# Patient Record
Sex: MALE | Race: WHITE | NOT HISPANIC OR LATINO | Employment: OTHER | URBAN - METROPOLITAN AREA
[De-identification: names, ages, dates, MRNs, and addresses within clinical notes are randomized per-mention and may not be internally consistent; named-entity substitution may affect disease eponyms.]

---

## 2017-01-03 ENCOUNTER — HOSPITAL ENCOUNTER (OUTPATIENT)
Dept: LAB | Facility: MEDICAL CENTER | Age: 62
End: 2017-01-03
Attending: FAMILY MEDICINE
Payer: COMMERCIAL

## 2017-01-03 DIAGNOSIS — I10 ESSENTIAL HYPERTENSION: ICD-10-CM

## 2017-01-03 DIAGNOSIS — Z13.0 SCREENING FOR DEFICIENCY ANEMIA: ICD-10-CM

## 2017-01-03 DIAGNOSIS — R73.03 PREDIABETES: ICD-10-CM

## 2017-01-03 DIAGNOSIS — E78.5 HYPERLIPIDEMIA: ICD-10-CM

## 2017-01-03 LAB
ALBUMIN SERPL BCP-MCNC: 4.4 G/DL (ref 3.2–4.9)
ALBUMIN/GLOB SERPL: 1.5 G/DL
ALP SERPL-CCNC: 47 U/L (ref 30–99)
ALT SERPL-CCNC: 29 U/L (ref 2–50)
ANION GAP SERPL CALC-SCNC: 6 MMOL/L (ref 0–11.9)
AST SERPL-CCNC: 19 U/L (ref 12–45)
BASOPHILS # BLD AUTO: 0.08 K/UL (ref 0–0.12)
BASOPHILS NFR BLD AUTO: 1.2 % (ref 0–1.8)
BILIRUB SERPL-MCNC: 0.6 MG/DL (ref 0.1–1.5)
BUN SERPL-MCNC: 18 MG/DL (ref 8–22)
CALCIUM SERPL-MCNC: 9.8 MG/DL (ref 8.5–10.5)
CHLORIDE SERPL-SCNC: 105 MMOL/L (ref 96–112)
CHOLEST SERPL-MCNC: 218 MG/DL (ref 100–199)
CO2 SERPL-SCNC: 27 MMOL/L (ref 20–33)
CREAT SERPL-MCNC: 1.09 MG/DL (ref 0.5–1.4)
EOSINOPHIL # BLD: 0.23 K/UL (ref 0–0.51)
EOSINOPHIL NFR BLD AUTO: 3.5 % (ref 0–6.9)
ERYTHROCYTE [DISTWIDTH] IN BLOOD BY AUTOMATED COUNT: 41.1 FL (ref 35.9–50)
GLOBULIN SER CALC-MCNC: 2.9 G/DL (ref 1.9–3.5)
GLUCOSE SERPL-MCNC: 90 MG/DL (ref 65–99)
HCT VFR BLD AUTO: 50.1 % (ref 42–52)
HDLC SERPL-MCNC: 50 MG/DL
HGB BLD-MCNC: 15.9 G/DL (ref 14–18)
IMM GRANULOCYTES # BLD AUTO: 0.02 K/UL (ref 0–0.11)
IMM GRANULOCYTES NFR BLD AUTO: 0.3 % (ref 0–0.9)
LDLC SERPL CALC-MCNC: 144 MG/DL
LYMPHOCYTES # BLD: 2.33 K/UL (ref 1–4.8)
LYMPHOCYTES NFR BLD AUTO: 35.4 % (ref 22–41)
MCH RBC QN AUTO: 28.6 PG (ref 27–33)
MCHC RBC AUTO-ENTMCNC: 31.7 G/DL (ref 33.7–35.3)
MCV RBC AUTO: 90.3 FL (ref 81.4–97.8)
MONOCYTES # BLD: 0.55 K/UL (ref 0–0.85)
MONOCYTES NFR BLD AUTO: 8.4 % (ref 0–13.4)
NEUTROPHILS # BLD: 3.37 K/UL (ref 1.82–7.42)
NEUTROPHILS NFR BLD AUTO: 51.2 % (ref 44–72)
NRBC # BLD AUTO: 0 K/UL
NRBC BLD-RTO: 0 /100 WBC
PLATELET # BLD AUTO: 230 K/UL (ref 164–446)
PMV BLD AUTO: 10.7 FL (ref 9–12.9)
POTASSIUM SERPL-SCNC: 4.4 MMOL/L (ref 3.6–5.5)
PROT SERPL-MCNC: 7.3 G/DL (ref 6–8.2)
RBC # BLD AUTO: 5.55 M/UL (ref 4.7–6.1)
SODIUM SERPL-SCNC: 138 MMOL/L (ref 135–145)
TRIGL SERPL-MCNC: 120 MG/DL (ref 0–149)
TSH SERPL DL<=0.005 MIU/L-ACNC: 2.16 UIU/ML (ref 0.3–3.7)
WBC # BLD AUTO: 6.6 K/UL (ref 4.8–10.8)

## 2017-01-03 PROCEDURE — 80061 LIPID PANEL: CPT

## 2017-01-03 PROCEDURE — 80053 COMPREHEN METABOLIC PANEL: CPT

## 2017-01-03 PROCEDURE — 85025 COMPLETE CBC W/AUTO DIFF WBC: CPT

## 2017-01-03 PROCEDURE — 36415 COLL VENOUS BLD VENIPUNCTURE: CPT

## 2017-01-03 PROCEDURE — 84443 ASSAY THYROID STIM HORMONE: CPT

## 2017-01-09 ENCOUNTER — OFFICE VISIT (OUTPATIENT)
Dept: MEDICAL GROUP | Age: 62
End: 2017-01-09
Payer: COMMERCIAL

## 2017-01-09 VITALS
HEART RATE: 72 BPM | HEIGHT: 71 IN | TEMPERATURE: 98.5 F | OXYGEN SATURATION: 94 % | DIASTOLIC BLOOD PRESSURE: 70 MMHG | BODY MASS INDEX: 40.38 KG/M2 | WEIGHT: 288.4 LBS | SYSTOLIC BLOOD PRESSURE: 126 MMHG

## 2017-01-09 DIAGNOSIS — G47.30 SLEEP DISORDER BREATHING: ICD-10-CM

## 2017-01-09 DIAGNOSIS — E78.2 MIXED HYPERLIPIDEMIA: ICD-10-CM

## 2017-01-09 DIAGNOSIS — I10 ESSENTIAL HYPERTENSION: ICD-10-CM

## 2017-01-09 DIAGNOSIS — R73.03 PREDIABETES: ICD-10-CM

## 2017-01-09 DIAGNOSIS — E66.01 MORBID OBESITY WITH BMI OF 40.0-44.9, ADULT (HCC): ICD-10-CM

## 2017-01-09 PROCEDURE — 99214 OFFICE O/P EST MOD 30 MIN: CPT | Performed by: FAMILY MEDICINE

## 2017-01-09 RX ORDER — ATORVASTATIN CALCIUM 20 MG/1
20 TABLET, FILM COATED ORAL DAILY
Qty: 30 TAB | Refills: 2 | Status: SHIPPED | OUTPATIENT
Start: 2017-01-09 | End: 2017-05-03 | Stop reason: SDUPTHER

## 2017-01-09 ASSESSMENT — PATIENT HEALTH QUESTIONNAIRE - PHQ9: CLINICAL INTERPRETATION OF PHQ2 SCORE: 0

## 2017-01-09 NOTE — PROGRESS NOTES
Right Subjective:     Chief Complaint   Patient presents with   • Hyperlipidemia     lab review     Car Coyle is a 61 y.o. male here today for issues listed below    Metabolic syndrome/impaired fasting glucose.   No current meds or monitoring. Denies post-prandial nausea or unusual fatigue.   Dnies unusual polydipsia or polyuria.   Current diet:  Higher protein and lower carb.   Lab Results   Component Value Date/Time    GLYCOHEMOGLOBIN 5.6 07/06/2016 08:09 AM      HTN - Chronic condition stable. Currently taking all meds as directed.   He is taking baby aspirin daily.   He is monitoring BP at home. 120-130/65-70  Denies symptoms low BP: occasional light-headed, tunnel-vision, unusual fatigue.   Denies symptoms high BP:pounding headache, visual changes, palpitations, flushed face.   Denies medicine side effects: unusual fatigue, slow heartbeat, foot/leg swelling, cough.   Dyslipidemia: Chronic condition. Last labs are not at goal.  Currently taking simvastatin as directed.  Denies side effects--no myalgias or abdominal pain.  He is not exercising regularly.  He denies dizziness, claudication, or chest pain.  Patient has morbid obesity. He was previously evaluated for sleep-disordered breathing. In 2014 he had overnight oximetry which showed multiple desaturations and apneic episodes. Patient is not convinced of the veracity of this report because he feels fine. At the time he declined any further evaluation.  Allergies: Nkda  Current medicines (including changes today)  Current Outpatient Prescriptions   Medication Sig Dispense Refill   • atorvastatin (LIPITOR) 20 MG Tab Take 1 Tab by mouth every day. 30 Tab 2   • lisinopril (PRINIVIL, ZESTRIL) 30 MG tablet Take 1 Tab by mouth every day. 90 Tab 1   • amlodipine (NORVASC) 10 MG Tab Take 1 Tab by mouth every day. TAKE 1 TAB BY MOUTH EVERY DAY. 90 Tab 1   • MULTIPLE VITAMIN PO Take 1 Tab by mouth every day.     • SAW PALMETTO Take  by mouth.     •  "Cholecalciferol (VITAMIN D3) 2000 UNIT CAPS Take 1,000 Units by mouth every day.     • ibuprofen (MOTRIN) 200 MG TABS Take 400 mg by mouth every 6 hours as needed.     • aspirin EC (ECOTRIN) 81 MG TBEC Take 81 mg by mouth every day.       No current facility-administered medications for this visit.       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  Health Maintenance: Shingles vaccine advised  ROS  Constitutional: Negative for fever, chills/sweats   Respiratory: Negative for shortness of breath, WILLIS  Cardiovascular: Negative for chest pain or pressure  GI/: Negative for diarrhea/constipation / urinary difficulty  All other systems reviewed and are negative except as per HPI.        Objective:     Blood pressure 126/70, pulse 72, temperature 36.9 °C (98.5 °F), height 1.803 m (5' 10.98\"), weight 130.817 kg (288 lb 6.4 oz), SpO2 94 %. Body mass index is 40.24 kg/(m^2).  Physical Exam:  Alert, oriented in no acute distress.  Eye contact is good, speech goal directed, affect calm  HEENT: conjunctiva non-injected, sclera non-icteric.  Pinna normal without skin lesions. TM pearly gray.   Oral mucous membranes pink and moist with no lesions. Tonsils surgically absent  Neck No adenopathy or masses in the neck or supraclavicular regions.  No carotid bruits. No thyromegaly  Lungs: clear to auscultation bilaterally with good excursion.  CV: regular rate and rhythm.  Abdomen No CVAT  Ext: no edema, no tenderness to palpation over shins    Results reviewed from 1/3/17: Glucose at goal at 90: Total cholesterol elevated at 218. LDL significantly elevated at 144 up from 98. TSH normal at 2.16    Assessment and Plan:     Cholesterol not at goal-medication adjustment needed  Treatment Changes: Discontinue " simvastatin and start atorvastatin. If patient is tolerating he will contact our office for prescription to mail order pharmacy  Car was seen today for hyperlipidemia.    Diagnoses and all orders for this visit:    Prediabetes  Comments:  Discussed risk of progression to diabetes and encouraged 7-10% body weight loss through diet and exercise.  Orders:  -     INSULIN FASTING; Future    Essential hypertension  Comments:  At goal. Continue current medications.    Mixed hyperlipidemia  Comments:  Not at goal despite reports of consistent use of medication. Changed to atorvastatin.  Orders:  -     atorvastatin (LIPITOR) 20 MG Tab; Take 1 Tab by mouth every day.  -     COMP METABOLIC PANEL; Future  -     LIPID PROFILE; Future    Sleep disorder breathing  Comments:  Patient referred for sleep study. Discussed risks of untreated sleep apnea.  Orders:  -     REFERRAL TO SLEEP STUDIES    Morbid obesity with BMI of 40.0-44.9, adult (HCC)  Comments:  Discussed diet, exercise.  Orders:  -     Patient identified as having weight management issue.  Appropriate orders and counseling given.        Followup: Return in about 6 months (around 7/9/2017) for well adult physical. sooner should new symptoms or problems arise.

## 2017-01-09 NOTE — MR AVS SNAPSHOT
"        Car Hewitting   2017 11:10 AM   Office Visit   MRN: [id number]    Department:  [address] [place] [address]   Dept Phone:  [phone]    Description:  Male : 1955   Provider:  Amada Anthony M.D.           Reason for Visit     Hyperlipidemia lab review      Allergies as of 2017     Allergen Noted Reactions    Nkda [No Known Drug Allergy] 2012         You were diagnosed with     Prediabetes   [512554]       Essential hypertension   [0000305]       Mixed hyperlipidemia   [272.2.ICD-9-CM]       Sleep disorder breathing   [744013]         Vital Signs     Blood Pressure Pulse Temperature Height Weight Body Mass Index    126/70 mmHg 72 36.9 °C (98.5 °F) 1.803 m (5' 10.98\") 130.817 kg (288 lb 6.4 oz) 40.24 kg/m2    Oxygen Saturation Smoking Status                94% Never Smoker           Basic Information     Date Of Birth Sex Race Ethnicity Preferred Language    1955 Male White Non- English      Problem List              ICD-10-CM Priority Class Noted - Resolved    HTN (hypertension) I10   2012 - Present    Hyperlipidemia E78.5   2012 - Present    HDL deficiency E78.6   2012 - Present    Hypertriglyceridemia E78.1   2012 - Present    Preventative health care Z00.00   2012 - Present    Vitamin D insufficiency E55.9   2012 - Present    Hypotestosteronism E29.1   2012 - Present    BPH (benign prostatic hyperplasia) N40.0   2013 - Present    Prediabetes R73.03   2014 - Present    Sleep disorder breathing G47.30   10/17/2014 - Present    Obesity (BMI 30-39.9) E66.9   2016 - Present      Health Maintenance        Date Due Completion Dates    IMM DTaP/Tdap/Td Vaccine (1 - Tdap) 1975 ---    IMM ZOSTER VACCINE 2016 ---    IMM INFLUENZA (1) 2016 ---    COLONOSCOPY 2023            Current Immunizations     No immunizations on file.      Below and/or attached are the medications your provider expects you to take. " Review all of your home medications and newly ordered medications with your provider and/or pharmacist. Follow medication instructions as directed by your provider and/or pharmacist. Please keep your medication list with you and share with your provider. Update the information when medications are discontinued, doses are changed, or new medications (including over-the-counter products) are added; and carry medication information at all times in the event of emergency situations     Allergies:  NKDA - (reactions not documented)               Medications  Valid as of: January 09, 2017 - 11:35 AM    Generic Name Brand Name Tablet Size Instructions for use    AmLODIPine Besylate (Tab) NORVASC 10 MG Take 1 Tab by mouth every day. TAKE 1 TAB BY MOUTH EVERY DAY.        Aspirin (Tablet Delayed Response) ECOTRIN 81 MG Take 81 mg by mouth every day.        Atorvastatin Calcium (Tab) LIPITOR 20 MG Take 1 Tab by mouth every day.        Cholecalciferol (Cap) Vitamin D3 2000 UNIT Take 1,000 Units by mouth every day.        Ibuprofen (Tab) MOTRIN 200 MG Take 400 mg by mouth every 6 hours as needed.        Lisinopril (Tab) PRINIVIL, ZESTRIL 30 MG Take 1 Tab by mouth every day.        Multiple Vitamin   Take 1 Tab by mouth every day.        Saw Palmetto   Take  by mouth.        .                 Medicines prescribed today were sent to:     [facility] - [address] [address]    Phone: [phone] Fax: [phone]    Open 24 Hours?: No    [facility]ARMACY #[id number] - [place], [place] - [address]    [address] [place] [address]    Phone: [phone] Fax: [phone]    Open 24 Hours?: No      Medication refill instructions:       If your prescription bottle indicates you have medication refills left, it is not necessary to call your provider’s office. Please contact your pharmacy and they will refill your medication.    If your prescription bottle  indicates you do not have any refills left, you may request refills at any time through one of the following ways: The online [employer] (acquired by [employer] in 2014) system (except Urgent Care), by calling your provider’s office, or by asking your pharmacy to contact your provider’s office with a refill request. Medication refills are processed only during regular business hours and may not be available until the next business day. Your provider may request additional information or to have a follow-up visit with you prior to refilling your medication.   *Please Note: Medication refills are assigned a new Rx number when refilled electronically. Your pharmacy may indicate that no refills were authorized even though a new prescription for the same medication is available at the pharmacy. Please request the medicine by name with the pharmacy before contacting your provider for a refill.        Your To Do List     Future Labs/Procedures Complete By Expires    COMP METABOLIC PANEL  7/8/2017 1/9/2018    INSULIN FASTING  7/8/2017 1/9/2018    LIPID PROFILE  7/8/2017 1/9/2018      Referral     A referral request has been sent to our patient care coordination department. Please allow 3-5 business days for us to process this request and contact you either by phone or mail. If you do not hear from us by the 5th business day, please call us at [phone].        Instructions    If systolic blood pressure is less than 120, cut amlodipine in half.           [employer] (acquired by [employer] in 2014) Access Code: Activation code not generated  Current [employer] (acquired by [employer] in 2014) Status: Active

## 2017-04-11 ENCOUNTER — PATIENT MESSAGE (OUTPATIENT)
Dept: MEDICAL GROUP | Age: 62
End: 2017-04-11

## 2017-05-03 ENCOUNTER — OFFICE VISIT (OUTPATIENT)
Dept: MEDICAL GROUP | Facility: PHYSICIAN GROUP | Age: 62
End: 2017-05-03
Payer: COMMERCIAL

## 2017-05-03 VITALS
DIASTOLIC BLOOD PRESSURE: 80 MMHG | HEART RATE: 65 BPM | TEMPERATURE: 98.2 F | SYSTOLIC BLOOD PRESSURE: 120 MMHG | HEIGHT: 70 IN | WEIGHT: 295 LBS | BODY MASS INDEX: 42.23 KG/M2 | OXYGEN SATURATION: 95 % | RESPIRATION RATE: 16 BRPM

## 2017-05-03 DIAGNOSIS — E78.2 MIXED HYPERLIPIDEMIA: ICD-10-CM

## 2017-05-03 DIAGNOSIS — I10 ESSENTIAL HYPERTENSION: ICD-10-CM

## 2017-05-03 DIAGNOSIS — R73.03 PREDIABETES: ICD-10-CM

## 2017-05-03 DIAGNOSIS — E66.01 MORBID OBESITY WITH BMI OF 40.0-44.9, ADULT (HCC): ICD-10-CM

## 2017-05-03 PROCEDURE — 99214 OFFICE O/P EST MOD 30 MIN: CPT | Performed by: FAMILY MEDICINE

## 2017-05-03 RX ORDER — ATORVASTATIN CALCIUM 20 MG/1
20 TABLET, FILM COATED ORAL DAILY
Qty: 90 TAB | Refills: 2 | Status: SHIPPED | OUTPATIENT
Start: 2017-05-03 | End: 2018-03-05 | Stop reason: SDUPTHER

## 2017-05-03 RX ORDER — LISINOPRIL 30 MG/1
30 TABLET ORAL
Qty: 90 TAB | Refills: 1 | Status: SHIPPED | OUTPATIENT
Start: 2017-05-03 | End: 2017-11-28 | Stop reason: SDUPTHER

## 2017-05-03 RX ORDER — AMLODIPINE BESYLATE 10 MG/1
10 TABLET ORAL
Qty: 90 TAB | Refills: 1 | Status: SHIPPED | OUTPATIENT
Start: 2017-05-03 | End: 2017-11-28 | Stop reason: SDUPTHER

## 2017-05-03 ASSESSMENT — ENCOUNTER SYMPTOMS
MYALGIAS: 0
EYES NEGATIVE: 1
COUGH: 0
FEVER: 0
DIZZINESS: 0
GASTROINTESTINAL NEGATIVE: 1
RESPIRATORY NEGATIVE: 1
CARDIOVASCULAR NEGATIVE: 1
HEADACHES: 0
NECK PAIN: 0
MUSCULOSKELETAL NEGATIVE: 1
CHILLS: 0
HEMOPTYSIS: 0
HYPERTENSION: 1
CONSTIPATION: 0
NEUROLOGICAL NEGATIVE: 1
CONSTITUTIONAL NEGATIVE: 1
PSYCHIATRIC NEGATIVE: 1
PALPITATIONS: 0

## 2017-05-03 NOTE — PROGRESS NOTES
Subjective:      Car Coyle is a 61 y.o. male who presents with Establish Care and Medication Refill            HPI Comments: 1. Morbid obesity with BMI of 40.0-44.9, adult (CMS-[facility])  Advised on weight loss    2. Essential hypertension  Currently treated for HTN, taking meds with no CP or sob, monitors bp at home periodically. controlled      - lisinopril (PRINIVIL, ZESTRIL) 30 MG tablet; Take 1 Tab by mouth every day.  Dispense: 90 Tab; Refill: 1  - amlodipine (NORVASC) 10 MG Tab; Take 1 Tab by mouth every day. TAKE 1 TAB BY MOUTH EVERY DAY.  Dispense: 90 Tab; Refill: 1    3. Mixed hyperlipidemia  Currently treated for HLD, taking meds with no new myalgias or joint pain, watching fats in diet  controlled    Just switched over to lipitor and will check in 6 mo  - atorvastatin (LIPITOR) 20 MG Tab; Take 1 Tab by mouth every day.  Dispense: 90 Tab; Refill: 2    Past Medical History:    HTN (hypertension)                              6/13/2012     Hyperlipidemia                                  6/13/2012     HDL deficiency                                  6/13/2012     Hypertriglyceridemia                            6/13/2012     Vitamin d deficiency                            6/27/2012     Hypotestosteronism                              6/27/2012     Sleep disorder breathing                        10/17/2014      Comment:Oct 2014. Abnormal overnight oximetry. 136                desats.    Past Surgical History:    TONSILLECTOMY                                                  INCISION HERNIA REPAIR                           1970s         KNEE ARTHROSCOPY                                 2005            Comment:right knee    Smoking Status: Never Smoker                      Smokeless Status: Never Used                        Alcohol Use: Yes           0.0 oz/week       0 Standard drinks or equivalent per week       Comment: socially    Review of patient's family history indicates:    Heart Failure                   Mother                    Genetic                        Father                      Comment: alzheimer's    Diabetes                       Sister                    Cancer                         Maternal Aunt               Comment: breast cancer      Current outpatient prescriptions: •  lisinopril (PRINIVIL, ZESTRIL) 30 MG tablet, Take 1 Tab by mouth every day., Disp: 90 Tab, Rfl: 1•  amlodipine (NORVASC) 10 MG Tab, Take 1 Tab by mouth every day. TAKE 1 TAB BY MOUTH EVERY DAY., Disp: 90 Tab, Rfl: 1•  atorvastatin (LIPITOR) 20 MG Tab, Take 1 Tab by mouth every day., Disp: 90 Tab, Rfl: 2•  MULTIPLE VITAMIN PO, Take 1 Tab by mouth every day., Disp: , Rfl: •  SAW PALMETTO, Take  by mouth., Disp: , Rfl: •  Cholecalciferol (VITAMIN D3) 2000 UNIT CAPS, Take 1,000 Units by mouth every day., Disp: , Rfl: •  ibuprofen (MOTRIN) 200 MG TABS, Take 400 mg by mouth every 6 hours as needed., Disp: , Rfl: •  aspirin EC (ECOTRIN) 81 MG TBEC, Take 81 mg by mouth every day., Disp: , Rfl:          Hypertension  This is a chronic problem. The current episode started more than 1 year ago. The problem is unchanged. The problem is controlled. Pertinent negatives include no chest pain, headaches, neck pain or palpitations. Risk factors for coronary artery disease include obesity and male gender. Past treatments include calcium channel blockers. The current treatment provides significant improvement. Compliance problems include diet.        Review of Systems   Constitutional: Negative.  Negative for fever and chills.        Past Medical History:    HTN (hypertension)                              6/13/2012     Hyperlipidemia                                  6/13/2012     HDL deficiency                                  6/13/2012     Hypertriglyceridemia                            6/13/2012     Vitamin d deficiency                            6/27/2012     Hypotestosteronism                              6/27/2012     Sleep disorder breathing  "                       10/17/2014      Comment:Oct 2014. Abnormal overnight oximetry. 136                desats.    Past Surgical History:    TONSILLECTOMY                                                  INCISION HERNIA REPAIR                           1970s         KNEE ARTHROSCOPY                                 2005            Comment:right knee    Smoking Status: Never Smoker                      Smokeless Status: Never Used                        Alcohol Use: Yes           0.0 oz/week       0 Standard drinks or equivalent per week       Comment: socially    Review of patient's family history indicates:    Heart Failure                  Mother                    Genetic                        Father                      Comment: alzheimer's    Diabetes                       Sister                    Cancer                         Maternal Aunt               Comment: breast cancer     HENT: Negative.    Eyes: Negative.    Respiratory: Negative.  Negative for cough and hemoptysis.    Cardiovascular: Negative.  Negative for chest pain and palpitations.   Gastrointestinal: Negative.  Negative for constipation.   Genitourinary: Negative.  Negative for dysuria and urgency.   Musculoskeletal: Negative.  Negative for myalgias and neck pain.   Skin: Negative.  Negative for rash.   Neurological: Negative.  Negative for dizziness and headaches.   Endo/Heme/Allergies: Negative.    Psychiatric/Behavioral: Negative.  Negative for suicidal ideas.          Objective:     /80 mmHg  Pulse 65  Temp(Src) 36.8 °C (98.2 °F)  Resp 16  Ht 1.778 m (5' 10\")  Wt 133.811 kg (295 lb)  BMI 42.33 kg/m2  SpO2 95%     Physical Exam   Constitutional: He is oriented to person, place, and time. No distress.   HENT:   Head: Normocephalic and atraumatic.   Right Ear: External ear normal.   Left Ear: External ear normal.   Nose: Nose normal.   Mouth/Throat: Oropharynx is clear and moist. No oropharyngeal exudate.   Eyes: Pupils are " equal, round, and reactive to light. Right eye exhibits no discharge. Left eye exhibits no discharge. No scleral icterus.   Neck: Normal range of motion. Neck supple. No JVD present. No tracheal deviation present. No thyromegaly present.   Cardiovascular: Normal rate, regular rhythm, normal heart sounds and intact distal pulses.  Exam reveals no gallop and no friction rub.    No murmur heard.  Pulmonary/Chest: Effort normal and breath sounds normal. No stridor. No respiratory distress. He has no wheezes. He has no rales. He exhibits no tenderness.   Abdominal: Soft. He exhibits no distension. There is no tenderness.   Lymphadenopathy:     He has no cervical adenopathy.   Neurological: He is alert and oriented to person, place, and time.   Skin: Skin is warm and dry. He is not diaphoretic.   Psychiatric: Judgment normal.   Nursing note and vitals reviewed.              Assessment/Plan:     1. Morbid obesity with BMI of 40.0-44.9, adult (CMS-[facility])      2. Essential hypertension    - lisinopril (PRINIVIL, ZESTRIL) 30 MG tablet; Take 1 Tab by mouth every day.  Dispense: 90 Tab; Refill: 1  - amlodipine (NORVASC) 10 MG Tab; Take 1 Tab by mouth every day. TAKE 1 TAB BY MOUTH EVERY DAY.  Dispense: 90 Tab; Refill: 1    3. Mixed hyperlipidemia    - atorvastatin (LIPITOR) 20 MG Tab; Take 1 Tab by mouth every day.  Dispense: 90 Tab; Refill: 2

## 2017-05-03 NOTE — MR AVS SNAPSHOT
"        Car Coyle   5/3/2017 9:00 AM   Office Visit   MRN: [id number]    Department:  [facility]    Dept Phone:  [phone]    Description:  Male : 1955   Provider:  Jonathan Reyes M.D.           Reason for Visit     Establish Care     Medication Refill           Allergies as of 5/3/2017     Allergen Noted Reactions    Nkda [No Known Drug Allergy] 2012         You were diagnosed with     Morbid obesity with BMI of 40.0-44.9, adult (CMS-HCC)   [813985]       Essential hypertension   [7789804]       Mixed hyperlipidemia   [272.2.ICD-9-CM]       Prediabetes   [343255]         Vital Signs     Blood Pressure Pulse Temperature Respirations Height Weight    120/80 mmHg 65 36.8 °C (98.2 °F) 16 1.778 m (5' 10\") 133.811 kg (295 lb)    Body Mass Index Oxygen Saturation Smoking Status             42.33 kg/m2 95% Never Smoker          Basic Information     Date Of Birth Sex Race Ethnicity Preferred Language    1955 Male White Non- English      Your appointments     2017  8:30 AM   Established Patient with Jonathan Reyes M.D.   [facility] (--)    [address] [facility] [id number] [phone]           You will be receiving a confirmation call a few days before your appointment from our automated call confirmation system.              Problem List              ICD-10-CM Priority Class Noted - Resolved    HTN (hypertension) I10   2012 - Present    Hyperlipidemia E78.5   2012 - Present    Vitamin D insufficiency E55.9   2012 - Present    Hypotestosteronism E29.1   2012 - Present    BPH (benign prostatic hyperplasia) N40.0   2013 - Present    Prediabetes R73.03   2014 - Present    Sleep disorder breathing G47.30   10/17/2014 - Present    Morbid obesity with BMI of 40.0-44.9, adult (CMS-HCC) E66.01, Z68.41   2017 - Present      Health Maintenance        Date Due Completion Dates    IMM DTaP/Tdap/Td " Vaccine (1 - Tdap) 7/9/1975 ---    IMM ZOSTER VACCINE 7/9/2016 ---    COLONOSCOPY 11/12/2023 11/12/2013            Current Immunizations     No immunizations on file.      Below and/or attached are the medications your provider expects you to take. Review all of your home medications and newly ordered medications with your provider and/or pharmacist. Follow medication instructions as directed by your provider and/or pharmacist. Please keep your medication list with you and share with your provider. Update the information when medications are discontinued, doses are changed, or new medications (including over-the-counter products) are added; and carry medication information at all times in the event of emergency situations     Allergies:  NKDA - (reactions not documented)               Medications  Valid as of: May 03, 2017 -  9:26 AM    Generic Name Brand Name Tablet Size Instructions for use    AmLODIPine Besylate (Tab) NORVASC 10 MG Take 1 Tab by mouth every day. TAKE 1 TAB BY MOUTH EVERY DAY.        Aspirin (Tablet Delayed Response) ECOTRIN 81 MG Take 81 mg by mouth every day.        Atorvastatin Calcium (Tab) LIPITOR 20 MG Take 1 Tab by mouth every day.        Cholecalciferol (Cap) Vitamin D3 2000 UNIT Take 1,000 Units by mouth every day.        Ibuprofen (Tab) MOTRIN 200 MG Take 400 mg by mouth every 6 hours as needed.        Lisinopril (Tab) PRINIVIL, ZESTRIL 30 MG Take 1 Tab by mouth every day.        Multiple Vitamin   Take 1 Tab by mouth every day.        Saw Palmetto   Take  by mouth.        .                 Medicines prescribed today were sent to:     [facility] - [address]    [address] [facility] [address]    Phone: [phone] Fax: [phone]    Open 24 Hours?: No    [facility] #[id number] - [place], [place] - [address]    [address] [facility] [address]    Phone: [phone] Fax: [phone]    Open 24 Hours?: No      Medication refill  instructions:       If your prescription bottle indicates you have medication refills left, it is not necessary to call your provider’s office. Please contact your pharmacy and they will refill your medication.    If your prescription bottle indicates you do not have any refills left, you may request refills at any time through one of the following ways: The online [employer] system (except Urgent Care), by calling your provider’s office, or by asking your pharmacy to contact your provider’s office with a refill request. Medication refills are processed only during regular business hours and may not be available until the next business day. Your provider may request additional information or to have a follow-up visit with you prior to refilling your medication.   *Please Note: Medication refills are assigned a new Rx number when refilled electronically. Your pharmacy may indicate that no refills were authorized even though a new prescription for the same medication is available at the pharmacy. Please request the medicine by name with the pharmacy before contacting your provider for a refill.        Your To Do List     Future Labs/Procedures Complete By Expires    COMP METABOLIC PANEL  As directed 5/3/2018    HEMOGLOBIN A1C  As directed 5/3/2018    LIPID PROFILE  As directed 5/3/2018    PROSTATE SPECIFIC AG SCREENING  As directed 11/2/2017         [employer] Access Code: Activation code not generated  Current [employer] Status: Active

## 2017-10-25 ENCOUNTER — HOSPITAL ENCOUNTER (OUTPATIENT)
Dept: LAB | Facility: MEDICAL CENTER | Age: 62
End: 2017-10-25
Attending: FAMILY MEDICINE
Payer: COMMERCIAL

## 2017-10-25 DIAGNOSIS — R73.03 PREDIABETES: ICD-10-CM

## 2017-10-25 DIAGNOSIS — I10 ESSENTIAL HYPERTENSION: ICD-10-CM

## 2017-10-25 DIAGNOSIS — E66.01 MORBID OBESITY WITH BMI OF 40.0-44.9, ADULT (HCC): ICD-10-CM

## 2017-10-25 DIAGNOSIS — E78.2 MIXED HYPERLIPIDEMIA: ICD-10-CM

## 2017-10-25 LAB
ALBUMIN SERPL BCP-MCNC: 4.2 G/DL (ref 3.2–4.9)
ALBUMIN/GLOB SERPL: 1.2 G/DL
ALP SERPL-CCNC: 66 U/L (ref 30–99)
ALT SERPL-CCNC: 27 U/L (ref 2–50)
ANION GAP SERPL CALC-SCNC: 8 MMOL/L (ref 0–11.9)
AST SERPL-CCNC: 20 U/L (ref 12–45)
BILIRUB SERPL-MCNC: 0.7 MG/DL (ref 0.1–1.5)
BUN SERPL-MCNC: 25 MG/DL (ref 8–22)
CALCIUM SERPL-MCNC: 10.1 MG/DL (ref 8.5–10.5)
CHLORIDE SERPL-SCNC: 106 MMOL/L (ref 96–112)
CHOLEST SERPL-MCNC: 167 MG/DL (ref 100–199)
CO2 SERPL-SCNC: 25 MMOL/L (ref 20–33)
CREAT SERPL-MCNC: 1.06 MG/DL (ref 0.5–1.4)
EST. AVERAGE GLUCOSE BLD GHB EST-MCNC: 123 MG/DL
GFR SERPL CREATININE-BSD FRML MDRD: >60 ML/MIN/1.73 M 2
GLOBULIN SER CALC-MCNC: 3.6 G/DL (ref 1.9–3.5)
GLUCOSE SERPL-MCNC: 88 MG/DL (ref 65–99)
HBA1C MFR BLD: 5.9 % (ref 0–5.6)
HDLC SERPL-MCNC: 35 MG/DL
LDLC SERPL CALC-MCNC: 99 MG/DL
POTASSIUM SERPL-SCNC: 4.5 MMOL/L (ref 3.6–5.5)
PROT SERPL-MCNC: 7.8 G/DL (ref 6–8.2)
PSA SERPL-MCNC: 1.13 NG/ML (ref 0–4)
SODIUM SERPL-SCNC: 139 MMOL/L (ref 135–145)
TRIGL SERPL-MCNC: 165 MG/DL (ref 0–149)

## 2017-10-25 PROCEDURE — 36415 COLL VENOUS BLD VENIPUNCTURE: CPT

## 2017-10-25 PROCEDURE — 84153 ASSAY OF PSA TOTAL: CPT

## 2017-10-25 PROCEDURE — 80061 LIPID PANEL: CPT

## 2017-10-25 PROCEDURE — 83036 HEMOGLOBIN GLYCOSYLATED A1C: CPT

## 2017-10-25 PROCEDURE — 80053 COMPREHEN METABOLIC PANEL: CPT

## 2017-11-07 ENCOUNTER — OFFICE VISIT (OUTPATIENT)
Dept: MEDICAL GROUP | Facility: PHYSICIAN GROUP | Age: 62
End: 2017-11-07
Payer: COMMERCIAL

## 2017-11-07 VITALS
BODY MASS INDEX: 43.23 KG/M2 | HEIGHT: 70 IN | SYSTOLIC BLOOD PRESSURE: 122 MMHG | WEIGHT: 302 LBS | HEART RATE: 63 BPM | OXYGEN SATURATION: 95 % | TEMPERATURE: 98 F | DIASTOLIC BLOOD PRESSURE: 62 MMHG | RESPIRATION RATE: 14 BRPM

## 2017-11-07 DIAGNOSIS — Z23 NEED FOR INFLUENZA VACCINATION: ICD-10-CM

## 2017-11-07 DIAGNOSIS — I10 ESSENTIAL HYPERTENSION: ICD-10-CM

## 2017-11-07 DIAGNOSIS — E78.2 MIXED HYPERLIPIDEMIA: ICD-10-CM

## 2017-11-07 DIAGNOSIS — Z23 NEED FOR TDAP VACCINATION: ICD-10-CM

## 2017-11-07 DIAGNOSIS — R73.03 PREDIABETES: ICD-10-CM

## 2017-11-07 PROCEDURE — 99214 OFFICE O/P EST MOD 30 MIN: CPT | Mod: 25 | Performed by: FAMILY MEDICINE

## 2017-11-07 PROCEDURE — 90715 TDAP VACCINE 7 YRS/> IM: CPT | Performed by: FAMILY MEDICINE

## 2017-11-07 PROCEDURE — 90686 IIV4 VACC NO PRSV 0.5 ML IM: CPT | Performed by: FAMILY MEDICINE

## 2017-11-07 PROCEDURE — 90472 IMMUNIZATION ADMIN EACH ADD: CPT | Performed by: FAMILY MEDICINE

## 2017-11-07 PROCEDURE — 90471 IMMUNIZATION ADMIN: CPT | Performed by: FAMILY MEDICINE

## 2017-11-07 ASSESSMENT — ENCOUNTER SYMPTOMS
HEMOPTYSIS: 0
COUGH: 0
FEVER: 0
CHILLS: 0
NEUROLOGICAL NEGATIVE: 1
DIZZINESS: 0
CONSTITUTIONAL NEGATIVE: 1
HEADACHES: 0
CONSTIPATION: 0
MYALGIAS: 0
MUSCULOSKELETAL NEGATIVE: 1
PALPITATIONS: 0
NECK PAIN: 0
RESPIRATORY NEGATIVE: 1
GASTROINTESTINAL NEGATIVE: 1
CARDIOVASCULAR NEGATIVE: 1
EYES NEGATIVE: 1
PSYCHIATRIC NEGATIVE: 1

## 2017-11-07 NOTE — PROGRESS NOTES
Subjective:      Car Coyle is a 62 y.o. male who presents with Blood Pressure Problem            1. Need for influenza vaccination  - Flu Quad Inj >3 Year Pre-Filled PF    2. Need for Tdap vaccination    - Tdap =>6yo IM    3. Essential hypertension  Currently treated for HTN, taking meds with no CP or sob, monitors bp at home periodically. controlled      4. Mixed hyperlipidemia  Currently treated for HLD, taking meds with no new myalgias or joint pain, watching fats in diet  controlled  ldl at [facility]    5. Prediabetes  a1c of 5.9 will continue with weight loss and diet    Past Medical History:  6/13/2012: HDL deficiency  6/13/2012: HTN (hypertension)  6/13/2012: Hyperlipidemia  6/13/2012: Hypertriglyceridemia  6/27/2012: Hypotestosteronism  10/17/2014: Sleep disorder breathing      Comment: Oct 2014. Abnormal overnight oximetry. 136                desats.    6/27/2012: Vitamin d deficiency  Past Surgical History:  2005: KNEE ARTHROSCOPY      Comment: right knee  1970s: INCISION HERNIA REPAIR  No date: TONSILLECTOMY  Smoking status: Never Smoker                                                              Smokeless tobacco: Never Used                      Alcohol use: Yes           0.0 oz/week     Comment: socially    Review of patient's family history indicates:    Heart Failure                  Mother                    Genetic                        Father                      Comment: alzheimer's    Diabetes                       Sister                    Cancer                         Maternal Aunt               Comment: breast cancer      Current Outpatient Prescriptions: •  amlodipine (NORVASC) 10 MG Tab, Take 1 Tab by mouth every day. TAKE 1 TAB BY MOUTH EVERY DAY., Disp: 90 Tab, Rfl: 1•  atorvastatin (LIPITOR) 20 MG Tab, Take 1 Tab by mouth every day., Disp: 90 Tab, Rfl: 2•  lisinopril (PRINIVIL, ZESTRIL) 30 MG tablet, Take 1 Tab by mouth every day. (Patient not taking: Reported on 11/7/2017),  Disp: 90 Tab, Rfl: 1•  MULTIPLE VITAMIN PO, Take 1 Tab by mouth every day., Disp: , Rfl: •  SAW PALMETTO, Take  by mouth., Disp: , Rfl: •  Cholecalciferol (VITAMIN D3) 2000 UNIT CAPS, Take 1,000 Units by mouth every day., Disp: , Rfl: •  ibuprofen (MOTRIN) 200 MG TABS, Take 400 mg by mouth every 6 hours as needed., Disp: , Rfl: •  aspirin EC (ECOTRIN) 81 MG TBEC, Take 81 mg by mouth every day., Disp: , Rfl:      Patient was instructed on the use of medications, either prescriptions or OTC and informed on when the appropriate follow up time period should be. In addition, patient was also instructed that should any acute worsening occur that they should notify this clinic asap or call 911.            Review of Systems   Constitutional: Negative.  Negative for chills and fever.        Past Medical History:  6/13/2012: HDL deficiency  6/13/2012: HTN (hypertension)  6/13/2012: Hyperlipidemia  6/13/2012: Hypertriglyceridemia  6/27/2012: Hypotestosteronism  10/17/2014: Sleep disorder breathing      Comment: Oct 2014. Abnormal overnight oximetry. 136                desats.    6/27/2012: Vitamin d deficiency  Past Surgical History:  2005: KNEE ARTHROSCOPY      Comment: right knee  1970s: INCISION HERNIA REPAIR  No date: TONSILLECTOMY  Smoking status: Never Smoker                                                              Smokeless tobacco: Never Used                      Alcohol use: Yes           0.0 oz/week     Comment: socially    Review of patient's family history indicates:    Heart Failure                  Mother                    Genetic                        Father                      Comment: alzheimer's    Diabetes                       Sister                    Cancer                         Maternal Aunt               Comment: breast cancer     HENT: Negative.    Eyes: Negative.    Respiratory: Negative.  Negative for cough and hemoptysis.    Cardiovascular: Negative.  Negative for chest pain and  "palpitations.   Gastrointestinal: Negative.  Negative for constipation.   Genitourinary: Negative.  Negative for dysuria and urgency.   Musculoskeletal: Negative.  Negative for myalgias and neck pain.   Skin: Negative.  Negative for rash.   Neurological: Negative.  Negative for dizziness and headaches.   Endo/Heme/Allergies: Negative.    Psychiatric/Behavioral: Negative.  Negative for suicidal ideas.          Objective:     /62   Pulse 63   Temp 36.7 °C (98 °F)   Resp 14   Ht 1.778 m (5' 10\")   Wt (!) 137 kg (302 lb)   SpO2 95%   BMI 43.33 kg/m²      Physical Exam   Constitutional: He is oriented to person, place, and time. He appears well-developed and well-nourished. No distress.   HENT:   Head: Normocephalic and atraumatic.   Mouth/Throat: Oropharynx is clear and moist. No oropharyngeal exudate.   Eyes: Pupils are equal, round, and reactive to light.   Cardiovascular: Normal rate, regular rhythm, normal heart sounds and intact distal pulses.  Exam reveals no gallop and no friction rub.    No murmur heard.  Pulmonary/Chest: Effort normal and breath sounds normal. No respiratory distress. He has no wheezes. He has no rales. He exhibits no tenderness.   Neurological: He is alert and oriented to person, place, and time.   Skin: He is not diaphoretic.   Psychiatric: He has a normal mood and affect. His behavior is normal. Judgment and thought content normal.   Nursing note and vitals reviewed.              Assessment/Plan:     1. Need for influenza vaccination    - Flu Quad Inj >3 Year Pre-Filled PF    2. Need for Tdap vaccination    - Tdap =>8yo IM    3. Essential hypertension      4. Mixed hyperlipidemia      5. Prediabetes        "

## 2017-11-28 DIAGNOSIS — R73.03 PREDIABETES: ICD-10-CM

## 2017-11-28 DIAGNOSIS — E78.2 MIXED HYPERLIPIDEMIA: ICD-10-CM

## 2017-11-28 DIAGNOSIS — I10 ESSENTIAL HYPERTENSION: ICD-10-CM

## 2017-11-28 DIAGNOSIS — E66.01 MORBID OBESITY WITH BMI OF 40.0-44.9, ADULT (HCC): ICD-10-CM

## 2017-11-28 RX ORDER — AMLODIPINE BESYLATE 10 MG/1
10 TABLET ORAL
Qty: 90 TAB | Refills: 1 | Status: SHIPPED | OUTPATIENT
Start: 2017-11-28 | End: 2018-06-13 | Stop reason: SDUPTHER

## 2017-11-28 RX ORDER — LISINOPRIL 30 MG/1
30 TABLET ORAL
Qty: 90 TAB | Refills: 1 | Status: SHIPPED | OUTPATIENT
Start: 2017-11-28 | End: 2018-06-13 | Stop reason: SDUPTHER

## 2017-12-04 ENCOUNTER — OFFICE VISIT (OUTPATIENT)
Dept: MEDICAL GROUP | Facility: PHYSICIAN GROUP | Age: 62
End: 2017-12-04
Payer: COMMERCIAL

## 2017-12-04 VITALS
TEMPERATURE: 98.9 F | OXYGEN SATURATION: 94 % | WEIGHT: 306 LBS | DIASTOLIC BLOOD PRESSURE: 70 MMHG | HEIGHT: 70 IN | SYSTOLIC BLOOD PRESSURE: 150 MMHG | RESPIRATION RATE: 14 BRPM | HEART RATE: 70 BPM | BODY MASS INDEX: 43.81 KG/M2

## 2017-12-04 DIAGNOSIS — J06.9 VIRAL URI WITH COUGH: ICD-10-CM

## 2017-12-04 PROCEDURE — 99213 OFFICE O/P EST LOW 20 MIN: CPT | Performed by: NURSE PRACTITIONER

## 2017-12-04 RX ORDER — ALBUTEROL SULFATE 90 UG/1
1-2 AEROSOL, METERED RESPIRATORY (INHALATION) EVERY 4 HOURS PRN
Qty: 1 INHALER | Refills: 1 | Status: SHIPPED | OUTPATIENT
Start: 2017-12-04 | End: 2018-05-29 | Stop reason: SDUPTHER

## 2017-12-04 RX ORDER — PREDNISONE 20 MG/1
20 TABLET ORAL 2 TIMES DAILY
Qty: 10 TAB | Refills: 0 | Status: SHIPPED | OUTPATIENT
Start: 2017-12-04 | End: 2017-12-09

## 2017-12-04 ASSESSMENT — COPD QUESTIONNAIRES: COPD: 0

## 2017-12-04 ASSESSMENT — ENCOUNTER SYMPTOMS
WHEEZING: 1
SHORTNESS OF BREATH: 1
FEVER: 0
CHILLS: 0
SPUTUM PRODUCTION: 0
HEADACHES: 0
SORE THROAT: 1
DIZZINESS: 0
ABDOMINAL PAIN: 0
PALPITATIONS: 0
BLURRED VISION: 0
NAUSEA: 0
VOMITING: 0
NECK PAIN: 0
COUGH: 1

## 2017-12-04 NOTE — PROGRESS NOTES
Subjective:   Car Coyle is a 62 y.o. male here today for cough and sinus congestion.     Cough   This is a new problem. Episode onset: Started 5 days ago. The problem has been gradually worsening. The problem occurs constantly. The cough is productive of sputum. Associated symptoms include nasal congestion, a sore throat, shortness of breath and wheezing. Pertinent negatives include no chest pain, chills, ear congestion, ear pain, fever or headaches. Nothing aggravates the symptoms. Treatments tried: Dayquil and Zinc supplements. His past medical history is significant for environmental allergies. There is no history of asthma or COPD.   Positive sick contacts (wife). He denies history of smoking.    Current medicines (including changes today)  Current Outpatient Prescriptions   Medication Sig Dispense Refill   • predniSONE (DELTASONE) 20 MG Tab Take 1 Tab by mouth 2 times a day for 5 days. 10 Tab 0   • albuterol 108 (90 Base) MCG/ACT Aero Soln inhalation aerosol Inhale 1-2 Puffs by mouth every four hours as needed for Shortness of Breath. 1 Inhaler 1   • amlodipine (NORVASC) 10 MG Tab Take 1 Tab by mouth every day. TAKE 1 TAB BY MOUTH EVERY DAY. 90 Tab 1   • lisinopril (PRINIVIL, ZESTRIL) 30 MG tablet Take 1 Tab by mouth every day. 90 Tab 1   • atorvastatin (LIPITOR) 20 MG Tab Take 1 Tab by mouth every day. 90 Tab 2   • MULTIPLE VITAMIN PO Take 1 Tab by mouth every day.     • SAW PALMETTO Take  by mouth.     • Cholecalciferol (VITAMIN D3) 2000 UNIT CAPS Take 1,000 Units by mouth every day.     • ibuprofen (MOTRIN) 200 MG TABS Take 400 mg by mouth every 6 hours as needed.     • aspirin EC (ECOTRIN) 81 MG TBEC Take 81 mg by mouth every day.       No current facility-administered medications for this visit.      He  has a past medical history of HDL deficiency (6/13/2012); HTN (hypertension) (6/13/2012); Hyperlipidemia (6/13/2012); Hypertriglyceridemia (6/13/2012); Hypotestosteronism (6/27/2012); Sleep  "disorder breathing (10/17/2014); and Vitamin d deficiency (6/27/2012).    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        Review of Systems   Constitutional: Negative for chills, fever and malaise/fatigue.   HENT: Positive for congestion and sore throat. Negative for ear pain and tinnitus.    Eyes: Negative for blurred vision.   Respiratory: Positive for cough, shortness of breath and wheezing. Negative for sputum production.    Cardiovascular: Negative for chest pain, palpitations and leg swelling.        Chest tightness     Gastrointestinal: Negative for abdominal pain, nausea and vomiting.   Genitourinary: Negative for dysuria.   Musculoskeletal: Negative for joint pain and neck pain.   Neurological: Negative for dizziness and headaches.   Endo/Heme/Allergies: Positive for environmental allergies.      Objective:     Blood pressure 150/70, pulse 70, temperature 37.2 °C (98.9 °F), resp. rate 14, height 1.778 m (5' 10\"), weight (!) 138.8 kg (306 lb), SpO2 94 %. Body mass index is 43.91 kg/m².     Physical Exam   Constitutional: He is oriented to person, place, and time and well-developed, well-nourished, and in no distress. No distress.   HENT:   Head: Normocephalic and atraumatic.   Right Ear: Tympanic membrane and external ear normal.   Left Ear: Tympanic membrane and external ear normal.   Mouth/Throat: Posterior oropharyngeal erythema present. No oropharyngeal exudate.   Eyes: Conjunctivae and EOM are normal. Pupils are equal, " round, and reactive to light.   Neck: Normal range of motion. Neck supple.   Cardiovascular: Normal rate, regular rhythm, normal heart sounds and intact distal pulses.  Exam reveals no friction rub.    No murmur heard.  Pulmonary/Chest: Effort normal. No respiratory distress. He has no decreased breath sounds. He has wheezes. He has no rhonchi. He has no rales. He exhibits no tenderness.   Musculoskeletal: Normal range of motion.   Neurological: He is alert and oriented to person, place, and time. Gait normal.   Skin: Skin is warm and dry.   Psychiatric: Mood, memory, affect and judgment normal.       Assessment and Plan:   The following treatment plan was discussed    1. Viral URI with cough  Symptoms most likely viral in nature. Explained that this is a self-limiting illness usually lasting 7-10 days. Order for prednisone burst and albuterol inhaler as needed. Recommend symptomatic treatment, including tylenol or ibuprofen for pain/fever, decongestant, saline nasal spray, vitamin C, and humidifier at bedside. Increase fluids and get plenty of rest.   - predniSONE (DELTASONE) 20 MG Tab; Take 1 Tab by mouth 2 times a day for 5 days.  Dispense: 10 Tab; Refill: 0  - albuterol 108 (90 Base) MCG/ACT Aero Soln inhalation aerosol; Inhale 1-2 Puffs by mouth every four hours as needed for Shortness of Breath.  Dispense: 1 Inhaler; Refill: 1    Followup: Return if symptoms worsen or fail to improve.

## 2018-03-05 DIAGNOSIS — I10 ESSENTIAL HYPERTENSION: ICD-10-CM

## 2018-03-05 DIAGNOSIS — R73.03 PREDIABETES: ICD-10-CM

## 2018-03-05 DIAGNOSIS — E78.2 MIXED HYPERLIPIDEMIA: ICD-10-CM

## 2018-03-05 DIAGNOSIS — E66.01 MORBID OBESITY WITH BMI OF 40.0-44.9, ADULT (HCC): ICD-10-CM

## 2018-03-06 RX ORDER — ATORVASTATIN CALCIUM 20 MG/1
TABLET, FILM COATED ORAL
Qty: 90 TAB | Refills: 2 | Status: SHIPPED | OUTPATIENT
Start: 2018-03-06 | End: 2018-12-17 | Stop reason: SDUPTHER

## 2018-03-19 ENCOUNTER — NON-PROVIDER VISIT (OUTPATIENT)
Dept: MEDICAL GROUP | Facility: PHYSICIAN GROUP | Age: 63
End: 2018-03-19
Payer: COMMERCIAL

## 2018-03-19 DIAGNOSIS — E10.638 TYPE 1 DIABETES MELLITUS WITH OTHER ORAL COMPLICATION (HCC): ICD-10-CM

## 2018-03-19 LAB
HBA1C MFR BLD: 5.8 % (ref ?–5.8)
INT CON NEG: NEGATIVE
INT CON POS: POSITIVE

## 2018-03-19 PROCEDURE — 83036 HEMOGLOBIN GLYCOSYLATED A1C: CPT | Performed by: FAMILY MEDICINE

## 2018-03-19 NOTE — PROGRESS NOTES
Patient came in as a Medical assistant visit and was wanting to get his A1C checked in office. In office it was 5.8 which it had dropped from the last visit. I informed the patient he can get it re checked in 3 months sooner if he chooses too.

## 2018-05-29 DIAGNOSIS — J06.9 VIRAL URI WITH COUGH: ICD-10-CM

## 2018-05-29 RX ORDER — ALBUTEROL SULFATE 90 UG/1
1-2 AEROSOL, METERED RESPIRATORY (INHALATION) EVERY 4 HOURS PRN
Qty: 1 INHALER | Refills: 1 | Status: SHIPPED | OUTPATIENT
Start: 2018-05-29 | End: 2021-08-29 | Stop reason: SDUPTHER

## 2018-05-29 NOTE — TELEPHONE ENCOUNTER
Was the patient seen in the last year in this department? Yes     Does patient have an active prescription for medications requested? Yes     Received Request Via: Pharmacy     Last visit 11/7/2017  Last labs 10/25/2017

## 2018-06-13 ENCOUNTER — OFFICE VISIT (OUTPATIENT)
Dept: MEDICAL GROUP | Facility: PHYSICIAN GROUP | Age: 63
End: 2018-06-13
Payer: COMMERCIAL

## 2018-06-13 VITALS
TEMPERATURE: 97.9 F | BODY MASS INDEX: 42.52 KG/M2 | RESPIRATION RATE: 14 BRPM | HEART RATE: 64 BPM | OXYGEN SATURATION: 95 % | SYSTOLIC BLOOD PRESSURE: 124 MMHG | HEIGHT: 70 IN | DIASTOLIC BLOOD PRESSURE: 62 MMHG | WEIGHT: 297 LBS

## 2018-06-13 DIAGNOSIS — R07.89 ATYPICAL CHEST PAIN: ICD-10-CM

## 2018-06-13 DIAGNOSIS — R73.03 PREDIABETES: ICD-10-CM

## 2018-06-13 DIAGNOSIS — E66.01 MORBID OBESITY WITH BMI OF 40.0-44.9, ADULT (HCC): ICD-10-CM

## 2018-06-13 DIAGNOSIS — I10 ESSENTIAL HYPERTENSION: ICD-10-CM

## 2018-06-13 DIAGNOSIS — E78.2 MIXED HYPERLIPIDEMIA: ICD-10-CM

## 2018-06-13 PROCEDURE — 99214 OFFICE O/P EST MOD 30 MIN: CPT | Mod: 25 | Performed by: FAMILY MEDICINE

## 2018-06-13 PROCEDURE — 93000 ELECTROCARDIOGRAM COMPLETE: CPT | Performed by: FAMILY MEDICINE

## 2018-06-13 RX ORDER — LISINOPRIL 30 MG/1
30 TABLET ORAL
Qty: 90 TAB | Refills: 1 | Status: SHIPPED | OUTPATIENT
Start: 2018-06-13 | End: 2018-12-17 | Stop reason: SDUPTHER

## 2018-06-13 RX ORDER — AMLODIPINE BESYLATE 10 MG/1
10 TABLET ORAL
Qty: 90 TAB | Refills: 1 | Status: SHIPPED | OUTPATIENT
Start: 2018-06-13 | End: 2018-12-17 | Stop reason: SDUPTHER

## 2018-06-13 ASSESSMENT — ENCOUNTER SYMPTOMS
HEADACHES: 0
DIZZINESS: 0
NECK PAIN: 0
SYNCOPE: 0
ABDOMINAL PAIN: 0
MYALGIAS: 0
CHILLS: 0
DIAPHORESIS: 0
HEMOPTYSIS: 0
CONSTITUTIONAL NEGATIVE: 1
PALPITATIONS: 0
GASTROINTESTINAL NEGATIVE: 1
EYES NEGATIVE: 1
BACK PAIN: 0
PSYCHIATRIC NEGATIVE: 1
COUGH: 0
CONSTIPATION: 0
IRREGULAR HEARTBEAT: 0
FEVER: 0
MUSCULOSKELETAL NEGATIVE: 1
RESPIRATORY NEGATIVE: 1
NEUROLOGICAL NEGATIVE: 1

## 2018-06-13 NOTE — PROGRESS NOTES
"Subjective:      Car Coyle is a 62 y.o. male who presents with Stress and Other (vibrations on chest and rib area)            Atypical \"tremor\" type feeling in the left chest for past few months    There are no diagnoses linked to this encounter.  Past Medical History:  6/13/2012: HDL deficiency  6/13/2012: HTN (hypertension)  6/13/2012: Hyperlipidemia  6/13/2012: Hypertriglyceridemia  6/27/2012: Hypotestosteronism  10/17/2014: Sleep disorder breathing      Comment: Oct 2014. Abnormal overnight oximetry. 136                desats.    6/27/2012: Vitamin d deficiency  Past Surgical History:  2005: KNEE ARTHROSCOPY      Comment: right knee  1970s: INCISION HERNIA REPAIR  No date: TONSILLECTOMY  Smoking status: Never Smoker                                                              Smokeless tobacco: Never Used                      Alcohol use: Yes           0.0 oz/week     Comment: socially    Review of patient's family history indicates:    Heart Failure                  Mother                    Genetic                        Father                      Comment: alzheimer's    Diabetes                       Sister                    Cancer                         Maternal Aunt               Comment: breast cancer      Current Outpatient Prescriptions: •  albuterol 108 (90 Base) MCG/ACT Aero Soln inhalation aerosol, Inhale 1-2 Puffs by mouth every four hours as needed for Shortness of Breath., Disp: 1 Inhaler, Rfl: 1•  atorvastatin (LIPITOR) 20 MG Tab, TAKE 1 TABLET DAILY, Disp: 90 Tab, Rfl: 2•  amlodipine (NORVASC) 10 MG Tab, Take 1 Tab by mouth every day. TAKE 1 TAB BY MOUTH EVERY DAY., Disp: 90 Tab, Rfl: 1•  lisinopril (PRINIVIL, ZESTRIL) 30 MG tablet, Take 1 Tab by mouth every day., Disp: 90 Tab, Rfl: 1•  MULTIPLE VITAMIN PO, Take 1 Tab by mouth every day., Disp: , Rfl: •  SAW PALMETTO, Take  by mouth., Disp: , Rfl: •  Cholecalciferol (VITAMIN D3) 2000 UNIT CAPS, Take 1,000 Units by mouth every day., " Disp: , Rfl: •  ibuprofen (MOTRIN) 200 MG TABS, Take 400 mg by mouth every 6 hours as needed., Disp: , Rfl: •  aspirin EC (ECOTRIN) 81 MG TBEC, Take 81 mg by mouth every day., Disp: , Rfl:      Patient was instructed on the use of medications, either prescriptions or OTC and informed on when the appropriate follow up time period should be. In addition, patient was also instructed that should any acute worsening occur that they should notify this clinic asap or call 911.          Chest Pain    This is a new problem. The current episode started more than 1 month ago. The onset quality is undetermined. The problem has been waxing and waning. The pain is present in the lateral region. The pain is mild. Quality: tremor type feeling in left chest, lasts only a few seconds but recurrs throughout the day. The pain does not radiate. Pertinent negatives include no abdominal pain, back pain, cough, diaphoresis, dizziness, fever, headaches, hemoptysis, irregular heartbeat, palpitations or syncope. The pain is aggravated by nothing. He has tried rest for the symptoms. The treatment provided no relief. Risk factors include male gender, obesity and lack of exercise.       Review of Systems   Constitutional: Negative.  Negative for chills, diaphoresis and fever.        Past Medical History:  6/13/2012: HDL deficiency  6/13/2012: HTN (hypertension)  6/13/2012: Hyperlipidemia  6/13/2012: Hypertriglyceridemia  6/27/2012: Hypotestosteronism  10/17/2014: Sleep disorder breathing      Comment: Oct 2014. Abnormal overnight oximetry. 136                desats.    6/27/2012: Vitamin d deficiency  Past Surgical History:  2005: KNEE ARTHROSCOPY      Comment: right knee  1970s: INCISION HERNIA REPAIR  No date: TONSILLECTOMY  Smoking status: Never Smoker                                                              Smokeless tobacco: Never Used                      Alcohol use: Yes           0.0 oz/week     Comment: socially    Review of  "patient's family history indicates:    Heart Failure                  Mother                    Genetic                        Father                      Comment: alzheimer's    Diabetes                       Sister                    Cancer                         Maternal Aunt               Comment: breast cancer     HENT: Negative.    Eyes: Negative.    Respiratory: Negative.  Negative for cough and hemoptysis.    Cardiovascular: Positive for chest pain. Negative for palpitations and syncope.   Gastrointestinal: Negative.  Negative for abdominal pain and constipation.   Genitourinary: Negative.  Negative for dysuria and urgency.   Musculoskeletal: Negative.  Negative for back pain, myalgias and neck pain.   Skin: Negative.  Negative for rash.   Neurological: Negative.  Negative for dizziness and headaches.   Endo/Heme/Allergies: Negative.    Psychiatric/Behavioral: Negative.  Negative for suicidal ideas.          Objective:     /62   Pulse 64   Temp 36.6 °C (97.9 °F)   Resp 14   Ht 1.778 m (5' 10\")   Wt (!) 134.7 kg (297 lb)   SpO2 95%   BMI 42.62 kg/m²      Physical Exam   Constitutional: He is oriented to person, place, and time. He appears well-developed and well-nourished. No distress.   HENT:   Head: Normocephalic and atraumatic.   Right Ear: External ear normal.   Left Ear: External ear normal.   Nose: Nose normal.   Mouth/Throat: Oropharynx is clear and moist. No oropharyngeal exudate.   Eyes: Pupils are equal, round, and reactive to light. Right eye exhibits no discharge. Left eye exhibits no discharge. No scleral icterus.   Neck: Normal range of motion. Neck supple. No JVD present. No tracheal deviation present. No thyromegaly present.   Cardiovascular: Normal rate, regular rhythm, normal heart sounds and intact distal pulses.  Exam reveals no gallop and no friction rub.    No murmur heard.  Pulmonary/Chest: Effort normal and breath sounds normal. No stridor. No respiratory distress. He has " no wheezes. He has no rales. He exhibits no tenderness.   Abdominal: Soft. He exhibits no distension. There is no tenderness.   Lymphadenopathy:     He has no cervical adenopathy.   Neurological: He is alert and oriented to person, place, and time. No cranial nerve deficit.   Skin: He is not diaphoretic.   Psychiatric: He has a normal mood and affect. His behavior is normal. Judgment and thought content normal.   Nursing note and vitals reviewed.              Assessment/Plan:     Atypical cp  ekg today with no significant findings will get stress test for full eval  With obesity and htn he is of a higher risk

## 2018-06-13 NOTE — TELEPHONE ENCOUNTER
Was the patient seen in the last year in this department? Yes     Does patient have an active prescription for medications requested? Yes     Received Request Via: Pharmacy     Last Visit:  Last Labs:

## 2018-10-10 ENCOUNTER — RX ONLY (OUTPATIENT)
Age: 63
Setting detail: RX ONLY
End: 2018-10-10

## 2018-10-10 RX ORDER — FLUOROURACIL 50 MG/G
1 CREAM TOPICAL BID
Qty: 1 | Refills: 0 | Status: ERX | COMMUNITY
Start: 2018-10-10

## 2018-12-17 DIAGNOSIS — I10 ESSENTIAL HYPERTENSION: ICD-10-CM

## 2018-12-17 DIAGNOSIS — R73.03 PREDIABETES: ICD-10-CM

## 2018-12-17 DIAGNOSIS — E78.2 MIXED HYPERLIPIDEMIA: ICD-10-CM

## 2018-12-17 DIAGNOSIS — E66.01 MORBID OBESITY WITH BMI OF 40.0-44.9, ADULT (HCC): ICD-10-CM

## 2018-12-17 RX ORDER — ATORVASTATIN CALCIUM 20 MG/1
TABLET, FILM COATED ORAL
Qty: 90 TAB | Refills: 2 | Status: SHIPPED | OUTPATIENT
Start: 2018-12-17 | End: 2019-10-08 | Stop reason: SDUPTHER

## 2018-12-17 RX ORDER — LISINOPRIL 30 MG/1
TABLET ORAL
Qty: 90 TAB | Refills: 1 | Status: SHIPPED | OUTPATIENT
Start: 2018-12-17 | End: 2019-07-17 | Stop reason: SDUPTHER

## 2018-12-17 RX ORDER — AMLODIPINE BESYLATE 10 MG/1
TABLET ORAL
Qty: 90 TAB | Refills: 1 | Status: SHIPPED | OUTPATIENT
Start: 2018-12-17 | End: 2019-07-17 | Stop reason: SDUPTHER

## 2018-12-17 NOTE — TELEPHONE ENCOUNTER
Was the patient seen in the last year in this department? Yes    Does patient have an active prescription for medications requested? Yes    Received Request Via: Pharmacy     Last Visit: 6/13/18  Last Labs: 10/25/18

## 2018-12-17 NOTE — TELEPHONE ENCOUNTER
Was the patient seen in the last year in this department? Yes    Does patient have an active prescription for medications requested? Yes    Received Request Via: Pharmacy     Last Visit: 6/13/18  Last Labs: 10/25/17

## 2019-02-06 ENCOUNTER — OFFICE VISIT (OUTPATIENT)
Dept: MEDICAL GROUP | Facility: PHYSICIAN GROUP | Age: 64
End: 2019-02-06
Payer: COMMERCIAL

## 2019-02-06 VITALS
WEIGHT: 283 LBS | TEMPERATURE: 98.1 F | OXYGEN SATURATION: 95 % | SYSTOLIC BLOOD PRESSURE: 124 MMHG | BODY MASS INDEX: 40.52 KG/M2 | HEART RATE: 70 BPM | RESPIRATION RATE: 16 BRPM | DIASTOLIC BLOOD PRESSURE: 74 MMHG | HEIGHT: 70 IN

## 2019-02-06 DIAGNOSIS — R73.03 PREDIABETES: ICD-10-CM

## 2019-02-06 DIAGNOSIS — R42 VERTIGO: ICD-10-CM

## 2019-02-06 DIAGNOSIS — G44.89 OTHER HEADACHE SYNDROME: ICD-10-CM

## 2019-02-06 DIAGNOSIS — E34.9 HYPOTESTOSTERONISM: ICD-10-CM

## 2019-02-06 DIAGNOSIS — I10 ESSENTIAL HYPERTENSION: ICD-10-CM

## 2019-02-06 PROCEDURE — 99213 OFFICE O/P EST LOW 20 MIN: CPT | Performed by: NURSE PRACTITIONER

## 2019-02-06 ASSESSMENT — ENCOUNTER SYMPTOMS
SPEECH CHANGE: 0
BLURRED VISION: 0
SHORTNESS OF BREATH: 0
SENSORY CHANGE: 0
VOMITING: 0
VISUAL CHANGE: 0
PALPITATIONS: 0
NUMBNESS: 0
FEVER: 0
TINGLING: 0
DIAPHORESIS: 0
FOCAL WEAKNESS: 0
NAUSEA: 0
PHOTOPHOBIA: 0
SINUS PRESSURE: 0
DIZZINESS: 0
WEAKNESS: 0
HEADACHES: 1
CHILLS: 0

## 2019-02-06 ASSESSMENT — PATIENT HEALTH QUESTIONNAIRE - PHQ9: CLINICAL INTERPRETATION OF PHQ2 SCORE: 0

## 2019-02-06 NOTE — PROGRESS NOTES
Subjective:   Car Coyle is a 63 y.o. male here today for headache and vertigo.     Headache    This is a recurrent problem. The current episode started 1 to 4 weeks ago. The problem occurs intermittently. The problem has been waxing and waning. The pain is located in the frontal and bilateral region. The pain does not radiate. The pain quality is similar to prior headaches. The quality of the pain is described as dull and aching. The pain is at a severity of 3/10. The pain is mild. Pertinent negatives include no blurred vision, dizziness (Vertigo), ear pain, fever, hearing loss, nausea, numbness, phonophobia, photophobia, sinus pressure, tingling, tinnitus, visual change, vomiting or weakness. Nothing aggravates the symptoms. He has tried NSAIDs for the symptoms. The treatment provided significant relief.   He drinks 3- 16 oz bottles of water daily. He drinks 2-3 cups of coffee daily. He started low-carb diet one month ago.     Current medicines (including changes today)  Current Outpatient Prescriptions   Medication Sig Dispense Refill   • atorvastatin (LIPITOR) 20 MG Tab TAKE 1 TABLET DAILY 90 Tab 2   • lisinopril (PRINIVIL, ZESTRIL) 30 MG tablet TAKE 1 TABLET DAILY 90 Tab 1   • amLODIPine (NORVASC) 10 MG Tab TAKE 1 TABLET DAILY 90 Tab 1   • albuterol 108 (90 Base) MCG/ACT Aero Soln inhalation aerosol Inhale 1-2 Puffs by mouth every four hours as needed for Shortness of Breath. 1 Inhaler 1   • MULTIPLE VITAMIN PO Take 1 Tab by mouth every day.     • SAW PALMETTO Take  by mouth.     • Cholecalciferol (VITAMIN D3) 2000 UNIT CAPS Take 1,000 Units by mouth every day.     • ibuprofen (MOTRIN) 200 MG TABS Take 400 mg by mouth every 6 hours as needed.     • aspirin EC (ECOTRIN) 81 MG TBEC Take 81 mg by mouth every day.       No current facility-administered medications for this visit.      He  has a past medical history of HDL deficiency (6/13/2012); HTN (hypertension) (6/13/2012); Hyperlipidemia (6/13/2012);  "Hypertriglyceridemia (6/13/2012); Hypotestosteronism (6/27/2012); Sleep disorder breathing (10/17/2014); and Vitamin d deficiency (6/27/2012).    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        Review of Systems   Constitutional: Negative for chills, diaphoresis and fever.   HENT: Negative for ear pain, hearing loss, sinus pressure and tinnitus.    Eyes: Negative for blurred vision and photophobia.   Respiratory: Negative for shortness of breath.    Cardiovascular: Negative for chest pain and palpitations.   Gastrointestinal: Negative for nausea and vomiting.   Neurological: Positive for headaches. Negative for dizziness (Vertigo), tingling, sensory change, speech change, focal weakness, weakness and numbness.        Objective:     Blood pressure 124/74, pulse 70, temperature 36.7 °C (98.1 °F), resp. rate 16, height 1.778 m (5' 10\"), weight (!) 128.4 kg (283 lb), SpO2 95 %. Body mass index is 40.61 kg/m².     Physical Exam:    Constitutional: Oriented to person, place, and time and well-developed, well-nourished, and in no distress.   HENT:   Head: Normocephalic and atraumatic.   Eyes: Conjunctivae and EOM are normal. Pupils are equal, round, and reactive to light.   Neck: Normal range of motion. Neck supple.   Cardiovascular: Normal rate, regular rhythm, normal heart sounds.Exam reveals no friction rub. No murmur heard.  Pulmonary/Chest: Effort normal and breath sounds normal. No respiratory distress or use of accessory muscles. No " wheezes, rhonchi, or rales.   Musculoskeletal: Full range of motion. +5 muscle strength all extremities.  Neurological: Alert and oriented to person, place, and time. Gait normal. CN I- CN XII intact. Coordination of rapid alternating movements. Cerebellar function intact. DTR +2, symmetrical.   Skin: Skin is warm and dry. No cyanosis. No edema.  Psychiatric: Mood, memory, affect and judgment normal.       Assessment and Plan:   The following treatment plan was discussed    1. Other headache syndrome  Symptoms are stable at today's visit.  No neuro deficits identified.  I do suspect his symptoms are related to poor daily water intake and recent change of low-carb diet.  I strongly discouraged that he continue low-carb diet if he continues to have headaches.  Strongly encouraged that he increase water intake to at least 2 L daily.  He was advised to return to clinic if symptoms worsen.  Recommend taking over-the-counter ibuprofen or Tylenol as needed for pain.    2. Vertigo  His symptoms are stable and improving with over-the-counter meclizine.  Advised that he continue to take this medication until symptoms resolved.      Followup: Return if symptoms worsen or fail to improve.

## 2019-02-21 NOTE — TELEPHONE ENCOUNTER
Patient called and was wondering if his labs were ordered and what to do from here. I told him they are ordered and that he can come to our lab to get them done.

## 2019-03-13 ENCOUNTER — HOSPITAL ENCOUNTER (OUTPATIENT)
Dept: LAB | Facility: MEDICAL CENTER | Age: 64
End: 2019-03-13
Attending: FAMILY MEDICINE
Payer: COMMERCIAL

## 2019-03-13 DIAGNOSIS — R42 VERTIGO: ICD-10-CM

## 2019-03-13 DIAGNOSIS — R73.03 PREDIABETES: ICD-10-CM

## 2019-03-13 DIAGNOSIS — E34.9 HYPOTESTOSTERONISM: ICD-10-CM

## 2019-03-13 DIAGNOSIS — I10 ESSENTIAL HYPERTENSION: ICD-10-CM

## 2019-03-13 DIAGNOSIS — G44.89 OTHER HEADACHE SYNDROME: ICD-10-CM

## 2019-03-13 LAB
25(OH)D3 SERPL-MCNC: 41 NG/ML (ref 30–100)
ALBUMIN SERPL BCP-MCNC: 4.4 G/DL (ref 3.2–4.9)
ALBUMIN/GLOB SERPL: 1.3 G/DL
ALP SERPL-CCNC: 60 U/L (ref 30–99)
ALT SERPL-CCNC: 26 U/L (ref 2–50)
ANION GAP SERPL CALC-SCNC: 7 MMOL/L (ref 0–11.9)
AST SERPL-CCNC: 24 U/L (ref 12–45)
BASOPHILS # BLD AUTO: 1.2 % (ref 0–1.8)
BASOPHILS # BLD: 0.1 K/UL (ref 0–0.12)
BILIRUB SERPL-MCNC: 0.7 MG/DL (ref 0.1–1.5)
BUN SERPL-MCNC: 26 MG/DL (ref 8–22)
CALCIUM SERPL-MCNC: 9.8 MG/DL (ref 8.5–10.5)
CHLORIDE SERPL-SCNC: 109 MMOL/L (ref 96–112)
CHOLEST SERPL-MCNC: 182 MG/DL (ref 100–199)
CO2 SERPL-SCNC: 25 MMOL/L (ref 20–33)
CREAT SERPL-MCNC: 1.02 MG/DL (ref 0.5–1.4)
EOSINOPHIL # BLD AUTO: 0.36 K/UL (ref 0–0.51)
EOSINOPHIL NFR BLD: 4.1 % (ref 0–6.9)
ERYTHROCYTE [DISTWIDTH] IN BLOOD BY AUTOMATED COUNT: 41.4 FL (ref 35.9–50)
EST. AVERAGE GLUCOSE BLD GHB EST-MCNC: 128 MG/DL
FASTING STATUS PATIENT QL REPORTED: NORMAL
GLOBULIN SER CALC-MCNC: 3.5 G/DL (ref 1.9–3.5)
GLUCOSE SERPL-MCNC: 103 MG/DL (ref 65–99)
HBA1C MFR BLD: 6.1 % (ref 0–5.6)
HCT VFR BLD AUTO: 49.7 % (ref 42–52)
HDLC SERPL-MCNC: 38 MG/DL
HGB BLD-MCNC: 16 G/DL (ref 14–18)
IMM GRANULOCYTES # BLD AUTO: 0.04 K/UL (ref 0–0.11)
IMM GRANULOCYTES NFR BLD AUTO: 0.5 % (ref 0–0.9)
LDLC SERPL CALC-MCNC: 115 MG/DL
LYMPHOCYTES # BLD AUTO: 3.29 K/UL (ref 1–4.8)
LYMPHOCYTES NFR BLD: 37.9 % (ref 22–41)
MCH RBC QN AUTO: 29.3 PG (ref 27–33)
MCHC RBC AUTO-ENTMCNC: 32.2 G/DL (ref 33.7–35.3)
MCV RBC AUTO: 91 FL (ref 81.4–97.8)
MONOCYTES # BLD AUTO: 0.75 K/UL (ref 0–0.85)
MONOCYTES NFR BLD AUTO: 8.6 % (ref 0–13.4)
NEUTROPHILS # BLD AUTO: 4.15 K/UL (ref 1.82–7.42)
NEUTROPHILS NFR BLD: 47.7 % (ref 44–72)
NRBC # BLD AUTO: 0 K/UL
NRBC BLD-RTO: 0 /100 WBC
PLATELET # BLD AUTO: 236 K/UL (ref 164–446)
PMV BLD AUTO: 10.4 FL (ref 9–12.9)
POTASSIUM SERPL-SCNC: 4.5 MMOL/L (ref 3.6–5.5)
PROT SERPL-MCNC: 7.9 G/DL (ref 6–8.2)
PSA SERPL-MCNC: 0.88 NG/ML (ref 0–4)
RBC # BLD AUTO: 5.46 M/UL (ref 4.7–6.1)
SODIUM SERPL-SCNC: 141 MMOL/L (ref 135–145)
T3 SERPL-MCNC: 118.1 NG/DL (ref 60–181)
T4 FREE SERPL-MCNC: 0.95 NG/DL (ref 0.53–1.43)
TESTOST SERPL-MCNC: 184 NG/DL (ref 175–781)
TRIGL SERPL-MCNC: 144 MG/DL (ref 0–149)
WBC # BLD AUTO: 8.7 K/UL (ref 4.8–10.8)

## 2019-03-13 PROCEDURE — 82306 VITAMIN D 25 HYDROXY: CPT

## 2019-03-13 PROCEDURE — 84403 ASSAY OF TOTAL TESTOSTERONE: CPT

## 2019-03-13 PROCEDURE — 83036 HEMOGLOBIN GLYCOSYLATED A1C: CPT

## 2019-03-13 PROCEDURE — 80061 LIPID PANEL: CPT

## 2019-03-13 PROCEDURE — 85025 COMPLETE CBC W/AUTO DIFF WBC: CPT

## 2019-03-13 PROCEDURE — 80053 COMPREHEN METABOLIC PANEL: CPT

## 2019-03-13 PROCEDURE — 36415 COLL VENOUS BLD VENIPUNCTURE: CPT

## 2019-03-13 PROCEDURE — 84480 ASSAY TRIIODOTHYRONINE (T3): CPT

## 2019-03-13 PROCEDURE — 84439 ASSAY OF FREE THYROXINE: CPT

## 2019-03-13 PROCEDURE — 84153 ASSAY OF PSA TOTAL: CPT

## 2019-03-14 ENCOUNTER — TELEPHONE (OUTPATIENT)
Dept: MEDICAL GROUP | Facility: PHYSICIAN GROUP | Age: 64
End: 2019-03-14

## 2019-03-14 NOTE — TELEPHONE ENCOUNTER
----- Message from Jonathan Reyes M.D. sent at 3/14/2019 10:56 AM PDT -----  This patient needs an appointment within the next week. Please schedule this with the patient so we may discuss their lab results

## 2019-03-22 ENCOUNTER — OFFICE VISIT (OUTPATIENT)
Dept: MEDICAL GROUP | Facility: PHYSICIAN GROUP | Age: 64
End: 2019-03-22
Payer: COMMERCIAL

## 2019-03-22 VITALS
DIASTOLIC BLOOD PRESSURE: 70 MMHG | BODY MASS INDEX: 44.81 KG/M2 | TEMPERATURE: 98.1 F | RESPIRATION RATE: 12 BRPM | OXYGEN SATURATION: 93 % | HEART RATE: 68 BPM | WEIGHT: 313 LBS | HEIGHT: 70 IN | SYSTOLIC BLOOD PRESSURE: 120 MMHG

## 2019-03-22 DIAGNOSIS — E74.39 GLUCOSE INTOLERANCE: ICD-10-CM

## 2019-03-22 PROCEDURE — 99214 OFFICE O/P EST MOD 30 MIN: CPT | Performed by: FAMILY MEDICINE

## 2019-03-22 NOTE — PROGRESS NOTES
Over 50% of this 25 minute visit was spent on counseling and coordination of care for the problem of  1. 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 Abnormal overnight oximetry. 136 desats.     • Vitamin d deficiency 6/27/2012     Past Surgical History:   Procedure Laterality Date   • KNEE ARTHROSCOPY  2005    right knee   • INCISION HERNIA REPAIR  1970s   • TONSILLECTOMY       Social History   Substance Use Topics   • Smoking status: Never Smoker   • Smokeless tobacco: Never Used   • Alcohol use 0.0 oz/week      Comment: socially     Family History   Problem Relation Age of Onset   • Heart Failure Mother    • Genetic Father         alzheimer's   • Diabetes Sister    • Cancer Maternal Aunt         breast cancer         Current Outpatient Prescriptions:   •  atorvastatin (LIPITOR) 20 MG Tab, TAKE 1 TABLET DAILY, Disp: 90 Tab, Rfl: 2  •  lisinopril (PRINIVIL, ZESTRIL) 30 MG tablet, TAKE 1 TABLET DAILY, Disp: 90 Tab, Rfl: 1  •  amLODIPine (NORVASC) 10 MG Tab, TAKE 1 TABLET DAILY, Disp: 90 Tab, Rfl: 1  •  albuterol 108 (90 Base) MCG/ACT Aero Soln inhalation aerosol, Inhale 1-2 Puffs by mouth every four hours as needed for Shortness of Breath., Disp: 1 Inhaler, Rfl: 1  •  MULTIPLE VITAMIN PO, Take 1 Tab by mouth every day., Disp: , Rfl:   •  SAW PALMETTO, Take  by mouth., Disp: , Rfl:   •  Cholecalciferol (VITAMIN D3) 2000 UNIT CAPS, Take 1,000 Units by mouth every day., Disp: , Rfl:   •  ibuprofen (MOTRIN) 200 MG TABS, Take 400 mg by mouth every 6 hours as needed., Disp: , Rfl:   •  aspirin EC  (ECOTRIN) 81 MG TBEC, Take 81 mg by mouth every day., Disp: , Rfl:     Patient was instructed on the use of medications, either prescriptions or OTC and informed on when the appropriate follow up time period should be. In addition, patient was also instructed that should any acute worsening occur that they should notify this clinic asap or call 911.

## 2019-07-17 DIAGNOSIS — I10 ESSENTIAL HYPERTENSION: ICD-10-CM

## 2019-07-17 DIAGNOSIS — E78.2 MIXED HYPERLIPIDEMIA: ICD-10-CM

## 2019-07-17 DIAGNOSIS — R73.03 PREDIABETES: ICD-10-CM

## 2019-07-17 DIAGNOSIS — E66.01 MORBID OBESITY WITH BMI OF 40.0-44.9, ADULT (HCC): ICD-10-CM

## 2019-07-17 NOTE — TELEPHONE ENCOUNTER
Was the patient seen in the last year in this department? Yes    Does patient have an active prescription for medications requested? Yes    Received Request Via: Pharmacy     Last Visit:03/22/2019  Last Lab:03/13/2019

## 2019-07-18 RX ORDER — LISINOPRIL 30 MG/1
TABLET ORAL
Qty: 90 TAB | Refills: 1 | Status: SHIPPED | OUTPATIENT
Start: 2019-07-18 | End: 2019-10-08 | Stop reason: SDUPTHER

## 2019-07-18 RX ORDER — AMLODIPINE BESYLATE 10 MG/1
TABLET ORAL
Qty: 90 TAB | Refills: 1 | Status: SHIPPED | OUTPATIENT
Start: 2019-07-18 | End: 2019-10-08 | Stop reason: SDUPTHER

## 2019-09-30 ENCOUNTER — APPOINTMENT (RX ONLY)
Dept: URBAN - METROPOLITAN AREA CLINIC 35 | Facility: CLINIC | Age: 64
Setting detail: DERMATOLOGY
End: 2019-09-30

## 2019-10-08 ENCOUNTER — OFFICE VISIT (OUTPATIENT)
Dept: MEDICAL GROUP | Facility: PHYSICIAN GROUP | Age: 64
End: 2019-10-08
Payer: COMMERCIAL

## 2019-10-08 VITALS
SYSTOLIC BLOOD PRESSURE: 130 MMHG | DIASTOLIC BLOOD PRESSURE: 78 MMHG | BODY MASS INDEX: 42.39 KG/M2 | RESPIRATION RATE: 16 BRPM | HEART RATE: 62 BPM | TEMPERATURE: 98.4 F | OXYGEN SATURATION: 93 % | WEIGHT: 313 LBS | HEIGHT: 72 IN

## 2019-10-08 DIAGNOSIS — R73.09 ELEVATED GLUCOSE: ICD-10-CM

## 2019-10-08 DIAGNOSIS — E66.01 MORBID OBESITY WITH BMI OF 40.0-44.9, ADULT (HCC): ICD-10-CM

## 2019-10-08 DIAGNOSIS — R73.03 PREDIABETES: ICD-10-CM

## 2019-10-08 DIAGNOSIS — I10 ESSENTIAL HYPERTENSION: ICD-10-CM

## 2019-10-08 DIAGNOSIS — J30.1 ACUTE SEASONAL ALLERGIC RHINITIS DUE TO POLLEN: ICD-10-CM

## 2019-10-08 DIAGNOSIS — E78.2 MIXED HYPERLIPIDEMIA: ICD-10-CM

## 2019-10-08 LAB
HBA1C MFR BLD: 5.7 % (ref 0–5.6)
INT CON NEG: NEGATIVE
INT CON POS: POSITIVE

## 2019-10-08 PROCEDURE — 83036 HEMOGLOBIN GLYCOSYLATED A1C: CPT | Performed by: FAMILY MEDICINE

## 2019-10-08 PROCEDURE — 99214 OFFICE O/P EST MOD 30 MIN: CPT | Performed by: FAMILY MEDICINE

## 2019-10-08 RX ORDER — AMLODIPINE BESYLATE 10 MG/1
TABLET ORAL
Qty: 90 TAB | Refills: 1 | Status: SHIPPED | OUTPATIENT
Start: 2019-10-08 | End: 2020-07-28

## 2019-10-08 RX ORDER — AZELASTINE 1 MG/ML
1 SPRAY, METERED NASAL 2 TIMES DAILY
Qty: 30 ML | Refills: 3 | Status: SHIPPED | OUTPATIENT
Start: 2019-10-08 | End: 2021-07-19

## 2019-10-08 RX ORDER — ATORVASTATIN CALCIUM 20 MG/1
TABLET, FILM COATED ORAL
Qty: 90 TAB | Refills: 2 | Status: SHIPPED | OUTPATIENT
Start: 2019-10-08 | End: 2020-07-28

## 2019-10-08 RX ORDER — AZELASTINE 1 MG/ML
1 SPRAY, METERED NASAL 2 TIMES DAILY
Qty: 30 ML | Refills: 3 | Status: SHIPPED | OUTPATIENT
Start: 2019-10-08 | End: 2019-10-08 | Stop reason: SDUPTHER

## 2019-10-08 RX ORDER — LISINOPRIL 30 MG/1
TABLET ORAL
Qty: 90 TAB | Refills: 1 | Status: SHIPPED | OUTPATIENT
Start: 2019-10-08 | End: 2020-07-28

## 2019-10-08 ASSESSMENT — ENCOUNTER SYMPTOMS
CONSTITUTIONAL NEGATIVE: 1
FEVER: 0
EYES NEGATIVE: 1
NEUROLOGICAL NEGATIVE: 1
CARDIOVASCULAR NEGATIVE: 1
MUSCULOSKELETAL NEGATIVE: 1
HEADACHES: 0
MYALGIAS: 0
GASTROINTESTINAL NEGATIVE: 1
HEARTBURN: 0
COUGH: 0
PALPITATIONS: 0
TINGLING: 0
HEMOPTYSIS: 0
DEPRESSION: 0
DOUBLE VISION: 0
DIZZINESS: 0
BRUISES/BLEEDS EASILY: 0
BLURRED VISION: 0
CHILLS: 0
PSYCHIATRIC NEGATIVE: 1
RESPIRATORY NEGATIVE: 1
NAUSEA: 0

## 2019-10-08 NOTE — PROGRESS NOTES
Subjective:      Car Coyle is a 64 y.o. male who presents with Follow-Up (6 month needs a HgA1c) and Sinus Problem (x 10 days)            1. Elevated glucose  a1c 5.7  Due to the patient's issues with glucose management, today they were extensively counseled on a low carb diet and exercise and losing weight    - POCT Hemoglobin A1C    2. Acute seasonal allergic rhinitis due to pollen  Stuffy nose over past month  - azelastine (ASTELIN) 137 MCG/SPRAY nasal spray; Spray 1 Spray in nose 2 times a day.  Dispense: 30 mL; Refill: 3    3. Mixed hyperlipidemia  Currently treated for HLD, taking meds with no new myalgias or joint pain, watching fats in diet  controlled    - atorvastatin (LIPITOR) 20 MG Tab; TAKE 1 TABLET DAILY  Dispense: 90 Tab; Refill: 2  - amLODIPine (NORVASC) 10 MG Tab; TAKE 1 TABLET DAILY  Dispense: 90 Tab; Refill: 1  - lisinopril (PRINIVIL, ZESTRIL) 30 MG tablet; TAKE 1 TABLET DAILY  Dispense: 90 Tab; Refill: 1    4. Morbid obesity with BMI of 40.0-44.9, adult (HCC)    - atorvastatin (LIPITOR) 20 MG Tab; TAKE 1 TABLET DAILY  Dispense: 90 Tab; Refill: 2  - amLODIPine (NORVASC) 10 MG Tab; TAKE 1 TABLET DAILY  Dispense: 90 Tab; Refill: 1  - lisinopril (PRINIVIL, ZESTRIL) 30 MG tablet; TAKE 1 TABLET DAILY  Dispense: 90 Tab; Refill: 1    5. Essential hypertension  Currently treated for HTN, taking meds with no CP or sob, monitors bp at home periodically. controlled    - atorvastatin (LIPITOR) 20 MG Tab; TAKE 1 TABLET DAILY  Dispense: 90 Tab; Refill: 2  - amLODIPine (NORVASC) 10 MG Tab; TAKE 1 TABLET DAILY  Dispense: 90 Tab; Refill: 1  - lisinopril (PRINIVIL, ZESTRIL) 30 MG tablet; TAKE 1 TABLET DAILY  Dispense: 90 Tab; Refill: 1    6. Prediabetes    - atorvastatin (LIPITOR) 20 MG Tab; TAKE 1 TABLET DAILY  Dispense: 90 Tab; Refill: 2  - amLODIPine (NORVASC) 10 MG Tab; TAKE 1 TABLET DAILY  Dispense: 90 Tab; Refill: 1  - lisinopril (PRINIVIL, ZESTRIL) 30 MG tablet; TAKE 1 TABLET DAILY  Dispense: 90  Tab; Refill: 1    Past Medical History:  6/13/2012: HDL deficiency  6/13/2012: HTN (hypertension)  6/13/2012: Hyperlipidemia  6/13/2012: Hypertriglyceridemia  6/27/2012: Hypotestosteronism  10/17/2014: Sleep disorder breathing      Comment:  Oct 2014. Abnormal overnight oximetry. 136 desats.    6/27/2012: Vitamin d deficiency  Past Surgical History:  2005: KNEE ARTHROSCOPY      Comment:  right knee  1970s: INCISION HERNIA REPAIR  No date: TONSILLECTOMY  Social History    Tobacco Use      Smoking status: Never Smoker      Smokeless tobacco: Never Used    Alcohol use: Yes      Alcohol/week: 0.0 oz      Comment: socially    Drug use: No    Review of patient's family history indicates:  Problem: Heart Failure      Relation: Mother          Age of Onset: (Not Specified)  Problem: Genetic Disorder      Relation: Father          Age of Onset: (Not Specified)          Comment: alzheimer's  Problem: Diabetes      Relation: Sister          Age of Onset: (Not Specified)  Problem: Cancer      Relation: Maternal Aunt          Age of Onset: (Not Specified)          Comment: breast cancer      Current Outpatient Medications: •  atorvastatin (LIPITOR) 20 MG Tab, TAKE 1 TABLET DAILY, Disp: 90 Tab, Rfl: 2•  amLODIPine (NORVASC) 10 MG Tab, TAKE 1 TABLET DAILY, Disp: 90 Tab, Rfl: 1•  lisinopril (PRINIVIL, ZESTRIL) 30 MG tablet, TAKE 1 TABLET DAILY, Disp: 90 Tab, Rfl: 1•  azelastine (ASTELIN) 137 MCG/SPRAY nasal spray, Spray 1 Spray in nose 2 times a day., Disp: 30 mL, Rfl: 3•  albuterol 108 (90 Base) MCG/ACT Aero Soln inhalation aerosol, Inhale 1-2 Puffs by mouth every four hours as needed for Shortness of Breath., Disp: 1 Inhaler, Rfl: 1•  MULTIPLE VITAMIN PO, Take 1 Tab by mouth every day., Disp: , Rfl: •  Cholecalciferol (VITAMIN D3) 2000 UNIT CAPS, Take 1,000 Units by mouth every day., Disp: , Rfl: •  ibuprofen (MOTRIN) 200 MG TABS, Take 400 mg by mouth every 6 hours as needed., Disp: , Rfl: •  aspirin EC (ECOTRIN) 81 MG TBEC, Take  "81 mg by mouth every day., Disp: , Rfl:  •  SAW PALMETTO, Take  by mouth., Disp: , Rfl:     Patient was instructed on the use of medications, either prescriptions or OTC and informed on when the appropriate follow up time period should be. In addition, patient was also instructed that should any acute worsening occur that they should notify this clinic asap or call 911.          Review of Systems   Constitutional: Negative.  Negative for chills and fever.   HENT: Negative.  Negative for hearing loss.    Eyes: Negative.  Negative for blurred vision and double vision.   Respiratory: Negative.  Negative for cough and hemoptysis.    Cardiovascular: Negative.  Negative for chest pain and palpitations.   Gastrointestinal: Negative.  Negative for heartburn and nausea.   Genitourinary: Negative.  Negative for dysuria.   Musculoskeletal: Negative.  Negative for myalgias.   Skin: Negative.  Negative for rash.   Neurological: Negative.  Negative for dizziness, tingling and headaches.   Endo/Heme/Allergies: Negative.  Does not bruise/bleed easily.   Psychiatric/Behavioral: Negative.  Negative for depression and suicidal ideas.   All other systems reviewed and are negative.         Objective:     /78   Pulse 62   Temp 36.9 °C (98.4 °F) (Temporal)   Resp 16   Ht 1.822 m (5' 11.75\")   Wt (!) 142 kg (313 lb)   SpO2 93%   BMI 42.75 kg/m²      Physical Exam   Constitutional: He is oriented to person, place, and time. He appears well-developed and well-nourished. No distress.   HENT:   Head: Normocephalic and atraumatic.   Mouth/Throat: Oropharynx is clear and moist. No oropharyngeal exudate.   Eyes: Pupils are equal, round, and reactive to light.   Cardiovascular: Normal rate, regular rhythm, normal heart sounds and intact distal pulses. Exam reveals no gallop and no friction rub.   No murmur heard.  Pulmonary/Chest: Effort normal and breath sounds normal. No respiratory distress. He has no wheezes. He has no rales. He " exhibits no tenderness.   Neurological: He is alert and oriented to person, place, and time.   Skin: He is not diaphoretic.   Psychiatric: He has a normal mood and affect. His behavior is normal. Judgment and thought content normal.   Nursing note and vitals reviewed.              Assessment/Plan:     1. Elevated glucose    - POCT Hemoglobin A1C    2. Acute seasonal allergic rhinitis due to pollen    - azelastine (ASTELIN) 137 MCG/SPRAY nasal spray; Spray 1 Spray in nose 2 times a day.  Dispense: 30 mL; Refill: 3    3. Mixed hyperlipidemia    - atorvastatin (LIPITOR) 20 MG Tab; TAKE 1 TABLET DAILY  Dispense: 90 Tab; Refill: 2  - amLODIPine (NORVASC) 10 MG Tab; TAKE 1 TABLET DAILY  Dispense: 90 Tab; Refill: 1  - lisinopril (PRINIVIL, ZESTRIL) 30 MG tablet; TAKE 1 TABLET DAILY  Dispense: 90 Tab; Refill: 1    4. Morbid obesity with BMI of 40.0-44.9, adult (HCC)    - atorvastatin (LIPITOR) 20 MG Tab; TAKE 1 TABLET DAILY  Dispense: 90 Tab; Refill: 2  - amLODIPine (NORVASC) 10 MG Tab; TAKE 1 TABLET DAILY  Dispense: 90 Tab; Refill: 1  - lisinopril (PRINIVIL, ZESTRIL) 30 MG tablet; TAKE 1 TABLET DAILY  Dispense: 90 Tab; Refill: 1    5. Essential hypertension    - atorvastatin (LIPITOR) 20 MG Tab; TAKE 1 TABLET DAILY  Dispense: 90 Tab; Refill: 2  - amLODIPine (NORVASC) 10 MG Tab; TAKE 1 TABLET DAILY  Dispense: 90 Tab; Refill: 1  - lisinopril (PRINIVIL, ZESTRIL) 30 MG tablet; TAKE 1 TABLET DAILY  Dispense: 90 Tab; Refill: 1    6. Prediabetes    - atorvastatin (LIPITOR) 20 MG Tab; TAKE 1 TABLET DAILY  Dispense: 90 Tab; Refill: 2  - amLODIPine (NORVASC) 10 MG Tab; TAKE 1 TABLET DAILY  Dispense: 90 Tab; Refill: 1  - lisinopril (PRINIVIL, ZESTRIL) 30 MG tablet; TAKE 1 TABLET DAILY  Dispense: 90 Tab; Refill: 1

## 2020-06-10 ENCOUNTER — OFFICE VISIT (OUTPATIENT)
Dept: MEDICAL GROUP | Facility: PHYSICIAN GROUP | Age: 65
End: 2020-06-10
Payer: COMMERCIAL

## 2020-06-10 VITALS
WEIGHT: 315 LBS | DIASTOLIC BLOOD PRESSURE: 62 MMHG | SYSTOLIC BLOOD PRESSURE: 152 MMHG | TEMPERATURE: 98.9 F | HEIGHT: 72 IN | OXYGEN SATURATION: 95 % | HEART RATE: 63 BPM | BODY MASS INDEX: 42.66 KG/M2 | RESPIRATION RATE: 16 BRPM

## 2020-06-10 DIAGNOSIS — E74.39 GLUCOSE INTOLERANCE: ICD-10-CM

## 2020-06-10 DIAGNOSIS — I10 ESSENTIAL HYPERTENSION: ICD-10-CM

## 2020-06-10 DIAGNOSIS — E78.2 MIXED HYPERLIPIDEMIA: ICD-10-CM

## 2020-06-10 DIAGNOSIS — E34.9 HYPOTESTOSTERONISM: ICD-10-CM

## 2020-06-10 PROCEDURE — 99396 PREV VISIT EST AGE 40-64: CPT | Performed by: FAMILY MEDICINE

## 2020-06-10 SDOH — HEALTH STABILITY: MENTAL HEALTH: HOW OFTEN DO YOU HAVE A DRINK CONTAINING ALCOHOL?: 4 OR MORE TIMES A WEEK

## 2020-06-10 ASSESSMENT — PATIENT HEALTH QUESTIONNAIRE - PHQ9: CLINICAL INTERPRETATION OF PHQ2 SCORE: 0

## 2020-06-10 ASSESSMENT — ENCOUNTER SYMPTOMS
RESPIRATORY NEGATIVE: 1
CARDIOVASCULAR NEGATIVE: 1
BLURRED VISION: 0
DIZZINESS: 0
DEPRESSION: 0
DOUBLE VISION: 0
COUGH: 0
CONSTITUTIONAL NEGATIVE: 1
TINGLING: 0
BRUISES/BLEEDS EASILY: 0
HEARTBURN: 0
NEUROLOGICAL NEGATIVE: 1
CHILLS: 0
EYES NEGATIVE: 1
PALPITATIONS: 0
GASTROINTESTINAL NEGATIVE: 1
NAUSEA: 0
MYALGIAS: 0
FEVER: 0
PSYCHIATRIC NEGATIVE: 1
MUSCULOSKELETAL NEGATIVE: 1
HEMOPTYSIS: 0
HEADACHES: 0

## 2020-06-10 ASSESSMENT — FIBROSIS 4 INDEX: FIB4 SCORE: 1.26

## 2020-06-10 NOTE — PROGRESS NOTES
Subjective:      Car Coyle is a 64 y.o. male who presents with Annual Exam            1. Glucose intolerance  Due to the patient's issues with glucose management, today they were extensively counseled on a low carb diet and exercise and losing weight    - Lipid Profile; Future  - HEMOGLOBIN A1C; Future  - Comp Metabolic Panel; Future  - FREE THYROXINE; Future  - TRIIDOTHYRONINE; Future  - CBC WITHOUT DIFFERENTIAL; Future  - TESTOSTERONE SERUM; Future  - PROSTATE SPECIFIC AG SCREENING; Future    2. Mixed hyperlipidemia  Currently treated for HLD, taking meds with no new myalgias or joint pain, watching fats in diet  controlled    - Lipid Profile; Future  - HEMOGLOBIN A1C; Future  - Comp Metabolic Panel; Future  - FREE THYROXINE; Future  - TRIIDOTHYRONINE; Future  - CBC WITHOUT DIFFERENTIAL; Future  - TESTOSTERONE SERUM; Future  - PROSTATE SPECIFIC AG SCREENING; Future    3. Hypotestosteronism  The patient's current medical issue is well controlled on the current therapy with no new symptoms or worsening    - Lipid Profile; Future  - HEMOGLOBIN A1C; Future  - Comp Metabolic Panel; Future  - FREE THYROXINE; Future  - TRIIDOTHYRONINE; Future  - CBC WITHOUT DIFFERENTIAL; Future  - TESTOSTERONE SERUM; Future  - PROSTATE SPECIFIC AG SCREENING; Future    4. Essential hypertension  Currently treated for HTN, taking meds with no CP or sob, monitors bp at home periodically. controlled    - Lipid Profile; Future  - HEMOGLOBIN A1C; Future  - Comp Metabolic Panel; Future  - FREE THYROXINE; Future  - TRIIDOTHYRONINE; Future  - CBC WITHOUT DIFFERENTIAL; Future  - TESTOSTERONE SERUM; Future  - PROSTATE SPECIFIC AG SCREENING; Future    Past Medical History:  6/13/2012: HDL deficiency  6/13/2012: HTN (hypertension)  6/13/2012: Hyperlipidemia  6/13/2012: Hypertriglyceridemia  6/27/2012: Hypotestosteronism  10/17/2014: Sleep disorder breathing      Comment:  Oct 2014. Abnormal overnight oximetry. 136 desats.    6/27/2012:  Vitamin d deficiency  Past Surgical History:  2005: KNEE ARTHROSCOPY      Comment:  right knee  1970s: INCISION HERNIA REPAIR  No date: TONSILLECTOMY  Social History    Tobacco Use      Smoking status: Never Smoker      Smokeless tobacco: Never Used    Alcohol use: Yes      Alcohol/week: 0.0 oz      Frequency: 4 or more times a week      Comment: socially    Drug use: No    Review of patient's family history indicates:  Problem: Heart Failure      Relation: Mother          Age of Onset: (Not Specified)  Problem: Genetic Disorder      Relation: Father          Age of Onset: (Not Specified)          Comment: alzheimer's  Problem: Diabetes      Relation: Sister          Age of Onset: (Not Specified)  Problem: Cancer      Relation: Maternal Aunt          Age of Onset: (Not Specified)          Comment: breast cancer      Current Outpatient Medications: •  atorvastatin (LIPITOR) 20 MG Tab, TAKE 1 TABLET DAILY, Disp: 90 Tab, Rfl: 2•  amLODIPine (NORVASC) 10 MG Tab, TAKE 1 TABLET DAILY, Disp: 90 Tab, Rfl: 1•  lisinopril (PRINIVIL, ZESTRIL) 30 MG tablet, TAKE 1 TABLET DAILY, Disp: 90 Tab, Rfl: 1•  albuterol 108 (90 Base) MCG/ACT Aero Soln inhalation aerosol, Inhale 1-2 Puffs by mouth every four hours as needed for Shortness of Breath., Disp: 1 Inhaler, Rfl: 1•  MULTIPLE VITAMIN PO, Take 1 Tab by mouth every day., Disp: , Rfl: •  Cholecalciferol (VITAMIN D3) 2000 UNIT CAPS, Take 1,000 Units by mouth every day., Disp: , Rfl: •  ibuprofen (MOTRIN) 200 MG TABS, Take 400 mg by mouth every 6 hours as needed., Disp: , Rfl: •  aspirin EC (ECOTRIN) 81 MG TBEC, Take 81 mg by mouth every day., Disp: , Rfl:  •  azelastine (ASTELIN) 137 MCG/SPRAY nasal spray, Spray 1 Spray in nose 2 times a day. (Patient not taking: Reported on 6/10/2020), Disp: 30 mL, Rfl: 3•  SAW PALMETTO, Take  by mouth., Disp: , Rfl:     Patient was instructed on the use of medications, either prescriptions or OTC and informed on when the appropriate follow up time  "period should be. In addition, patient was also instructed that should any acute worsening occur that they should notify this clinic asap or call 911.        Annual Exam   Pertinent negatives include no chest pain, chills, coughing, fever, headaches, myalgias, nausea or rash.       Review of Systems   Constitutional: Negative.  Negative for chills and fever.   HENT: Negative.  Negative for hearing loss.    Eyes: Negative.  Negative for blurred vision and double vision.   Respiratory: Negative.  Negative for cough and hemoptysis.    Cardiovascular: Negative.  Negative for chest pain and palpitations.   Gastrointestinal: Negative.  Negative for heartburn and nausea.   Genitourinary: Negative.  Negative for dysuria.   Musculoskeletal: Negative.  Negative for myalgias.   Skin: Negative.  Negative for rash.   Neurological: Negative.  Negative for dizziness, tingling and headaches.   Endo/Heme/Allergies: Negative.  Does not bruise/bleed easily.   Psychiatric/Behavioral: Negative.  Negative for depression and suicidal ideas.   All other systems reviewed and are negative.         Objective:     /62 (BP Location: Left arm, Patient Position: Sitting)   Pulse 63   Temp 37.2 °C (98.9 °F) (Tympanic)   Resp 16   Ht 1.816 m (5' 11.5\")   Wt (!) 148.5 kg (327 lb 6.4 oz)   SpO2 95%   BMI 45.03 kg/m²      Physical Exam  Vitals signs and nursing note reviewed.   Constitutional:       General: He is not in acute distress.     Appearance: He is well-developed. He is not diaphoretic.   HENT:      Head: Normocephalic and atraumatic.      Mouth/Throat:      Pharynx: No oropharyngeal exudate.   Eyes:      Pupils: Pupils are equal, round, and reactive to light.   Cardiovascular:      Rate and Rhythm: Normal rate and regular rhythm.      Heart sounds: Normal heart sounds. No murmur. No friction rub. No gallop.    Pulmonary:      Effort: Pulmonary effort is normal. No respiratory distress.      Breath sounds: Normal breath sounds. " No wheezing or rales.   Chest:      Chest wall: No tenderness.   Neurological:      Mental Status: He is alert and oriented to person, place, and time.   Psychiatric:         Behavior: Behavior normal.         Thought Content: Thought content normal.         Judgment: Judgment normal.                 Assessment/Plan:       1. Glucose intolerance    - Lipid Profile; Future  - HEMOGLOBIN A1C; Future  - Comp Metabolic Panel; Future  - FREE THYROXINE; Future  - TRIIDOTHYRONINE; Future  - CBC WITHOUT DIFFERENTIAL; Future  - TESTOSTERONE SERUM; Future  - PROSTATE SPECIFIC AG SCREENING; Future    2. Mixed hyperlipidemia    - Lipid Profile; Future  - HEMOGLOBIN A1C; Future  - Comp Metabolic Panel; Future  - FREE THYROXINE; Future  - TRIIDOTHYRONINE; Future  - CBC WITHOUT DIFFERENTIAL; Future  - TESTOSTERONE SERUM; Future  - PROSTATE SPECIFIC AG SCREENING; Future    3. Hypotestosteronism    - Lipid Profile; Future  - HEMOGLOBIN A1C; Future  - Comp Metabolic Panel; Future  - FREE THYROXINE; Future  - TRIIDOTHYRONINE; Future  - CBC WITHOUT DIFFERENTIAL; Future  - TESTOSTERONE SERUM; Future  - PROSTATE SPECIFIC AG SCREENING; Future    4. Essential hypertension  - Lipid Profile; Future  - HEMOGLOBIN A1C; Future  - Comp Metabolic Panel; Future  - FREE THYROXINE; Future  - TRIIDOTHYRONINE; Future  - CBC WITHOUT DIFFERENTIAL; Future  - TESTOSTERONE SERUM; Future  - PROSTATE SPECIFIC AG SCREENING; Future

## 2020-06-12 ENCOUNTER — HOSPITAL ENCOUNTER (OUTPATIENT)
Dept: LAB | Facility: MEDICAL CENTER | Age: 65
End: 2020-06-12
Attending: FAMILY MEDICINE
Payer: COMMERCIAL

## 2020-06-12 DIAGNOSIS — E78.2 MIXED HYPERLIPIDEMIA: ICD-10-CM

## 2020-06-12 DIAGNOSIS — I10 ESSENTIAL HYPERTENSION: ICD-10-CM

## 2020-06-12 DIAGNOSIS — E34.9 HYPOTESTOSTERONISM: ICD-10-CM

## 2020-06-12 DIAGNOSIS — E74.39 GLUCOSE INTOLERANCE: ICD-10-CM

## 2020-06-12 LAB
ALBUMIN SERPL BCP-MCNC: 4.3 G/DL (ref 3.2–4.9)
ALBUMIN/GLOB SERPL: 1.3 G/DL
ALP SERPL-CCNC: 73 U/L (ref 30–99)
ALT SERPL-CCNC: 28 U/L (ref 2–50)
ANION GAP SERPL CALC-SCNC: 10 MMOL/L (ref 7–16)
AST SERPL-CCNC: 18 U/L (ref 12–45)
BILIRUB SERPL-MCNC: 0.5 MG/DL (ref 0.1–1.5)
BUN SERPL-MCNC: 27 MG/DL (ref 8–22)
CALCIUM SERPL-MCNC: 10 MG/DL (ref 8.5–10.5)
CHLORIDE SERPL-SCNC: 103 MMOL/L (ref 96–112)
CHOLEST SERPL-MCNC: 209 MG/DL (ref 100–199)
CO2 SERPL-SCNC: 22 MMOL/L (ref 20–33)
CREAT SERPL-MCNC: 1 MG/DL (ref 0.5–1.4)
ERYTHROCYTE [DISTWIDTH] IN BLOOD BY AUTOMATED COUNT: 42.2 FL (ref 35.9–50)
FASTING STATUS PATIENT QL REPORTED: NORMAL
GLOBULIN SER CALC-MCNC: 3.2 G/DL (ref 1.9–3.5)
GLUCOSE SERPL-MCNC: 92 MG/DL (ref 65–99)
HCT VFR BLD AUTO: 49.2 % (ref 42–52)
HDLC SERPL-MCNC: 48 MG/DL
HGB BLD-MCNC: 16.1 G/DL (ref 14–18)
LDLC SERPL CALC-MCNC: 128 MG/DL
MCH RBC QN AUTO: 29.4 PG (ref 27–33)
MCHC RBC AUTO-ENTMCNC: 32.7 G/DL (ref 33.7–35.3)
MCV RBC AUTO: 89.8 FL (ref 81.4–97.8)
PLATELET # BLD AUTO: 248 K/UL (ref 164–446)
PMV BLD AUTO: 10.4 FL (ref 9–12.9)
POTASSIUM SERPL-SCNC: 4.1 MMOL/L (ref 3.6–5.5)
PROT SERPL-MCNC: 7.5 G/DL (ref 6–8.2)
PSA SERPL-MCNC: 0.78 NG/ML (ref 0–4)
RBC # BLD AUTO: 5.48 M/UL (ref 4.7–6.1)
SODIUM SERPL-SCNC: 135 MMOL/L (ref 135–145)
T3 SERPL-MCNC: 93.1 NG/DL (ref 60–181)
T4 FREE SERPL-MCNC: 1.37 NG/DL (ref 0.93–1.7)
TESTOST SERPL-MCNC: 69 NG/DL (ref 175–781)
TRIGL SERPL-MCNC: 163 MG/DL (ref 0–149)
WBC # BLD AUTO: 15.3 K/UL (ref 4.8–10.8)

## 2020-06-12 PROCEDURE — 84439 ASSAY OF FREE THYROXINE: CPT

## 2020-06-12 PROCEDURE — 83036 HEMOGLOBIN GLYCOSYLATED A1C: CPT

## 2020-06-12 PROCEDURE — 84480 ASSAY TRIIODOTHYRONINE (T3): CPT

## 2020-06-12 PROCEDURE — 80061 LIPID PANEL: CPT

## 2020-06-12 PROCEDURE — 85027 COMPLETE CBC AUTOMATED: CPT

## 2020-06-12 PROCEDURE — 80053 COMPREHEN METABOLIC PANEL: CPT

## 2020-06-12 PROCEDURE — 36415 COLL VENOUS BLD VENIPUNCTURE: CPT

## 2020-06-12 PROCEDURE — 84403 ASSAY OF TOTAL TESTOSTERONE: CPT

## 2020-06-12 PROCEDURE — 84153 ASSAY OF PSA TOTAL: CPT

## 2020-06-13 LAB
EST. AVERAGE GLUCOSE BLD GHB EST-MCNC: 128 MG/DL
HBA1C MFR BLD: 6.1 % (ref 0–5.6)

## 2020-07-27 DIAGNOSIS — I10 ESSENTIAL HYPERTENSION: ICD-10-CM

## 2020-07-27 DIAGNOSIS — E66.01 MORBID OBESITY WITH BMI OF 40.0-44.9, ADULT (HCC): ICD-10-CM

## 2020-07-27 DIAGNOSIS — R73.03 PREDIABETES: ICD-10-CM

## 2020-07-27 DIAGNOSIS — E78.2 MIXED HYPERLIPIDEMIA: ICD-10-CM

## 2020-07-28 RX ORDER — ATORVASTATIN CALCIUM 20 MG/1
TABLET, FILM COATED ORAL
Qty: 90 TAB | Refills: 3 | Status: SHIPPED | OUTPATIENT
Start: 2020-07-28 | End: 2021-07-19 | Stop reason: SDUPTHER

## 2020-07-28 RX ORDER — AMLODIPINE BESYLATE 10 MG/1
TABLET ORAL
Qty: 90 TAB | Refills: 3 | Status: SHIPPED | OUTPATIENT
Start: 2020-07-28 | End: 2021-07-19 | Stop reason: SDUPTHER

## 2020-07-28 RX ORDER — LISINOPRIL 30 MG/1
TABLET ORAL
Qty: 90 TAB | Refills: 3 | Status: SHIPPED | OUTPATIENT
Start: 2020-07-28 | End: 2021-07-19 | Stop reason: SDUPTHER

## 2020-08-11 ENCOUNTER — HOSPITAL ENCOUNTER (OUTPATIENT)
Dept: LAB | Facility: MEDICAL CENTER | Age: 65
End: 2020-08-11
Attending: FAMILY MEDICINE
Payer: COMMERCIAL

## 2020-08-11 DIAGNOSIS — I10 ESSENTIAL HYPERTENSION: ICD-10-CM

## 2020-08-11 LAB
BASOPHILS # BLD AUTO: 0.9 % (ref 0–1.8)
BASOPHILS # BLD: 0.08 K/UL (ref 0–0.12)
EOSINOPHIL # BLD AUTO: 0.3 K/UL (ref 0–0.51)
EOSINOPHIL NFR BLD: 3.5 % (ref 0–6.9)
ERYTHROCYTE [DISTWIDTH] IN BLOOD BY AUTOMATED COUNT: 41.3 FL (ref 35.9–50)
HCT VFR BLD AUTO: 49.4 % (ref 42–52)
HGB BLD-MCNC: 16.5 G/DL (ref 14–18)
IMM GRANULOCYTES # BLD AUTO: 0.04 K/UL (ref 0–0.11)
IMM GRANULOCYTES NFR BLD AUTO: 0.5 % (ref 0–0.9)
LYMPHOCYTES # BLD AUTO: 2.7 K/UL (ref 1–4.8)
LYMPHOCYTES NFR BLD: 31.1 % (ref 22–41)
MCH RBC QN AUTO: 29.9 PG (ref 27–33)
MCHC RBC AUTO-ENTMCNC: 33.4 G/DL (ref 33.7–35.3)
MCV RBC AUTO: 89.5 FL (ref 81.4–97.8)
MONOCYTES # BLD AUTO: 0.64 K/UL (ref 0–0.85)
MONOCYTES NFR BLD AUTO: 7.4 % (ref 0–13.4)
NEUTROPHILS # BLD AUTO: 4.92 K/UL (ref 1.82–7.42)
NEUTROPHILS NFR BLD: 56.6 % (ref 44–72)
NRBC # BLD AUTO: 0 K/UL
NRBC BLD-RTO: 0 /100 WBC
PLATELET # BLD AUTO: 245 K/UL (ref 164–446)
PMV BLD AUTO: 10.4 FL (ref 9–12.9)
RBC # BLD AUTO: 5.52 M/UL (ref 4.7–6.1)
WBC # BLD AUTO: 8.7 K/UL (ref 4.8–10.8)

## 2020-08-11 PROCEDURE — 36415 COLL VENOUS BLD VENIPUNCTURE: CPT

## 2020-08-11 PROCEDURE — 85025 COMPLETE CBC W/AUTO DIFF WBC: CPT

## 2020-08-17 ENCOUNTER — OFFICE VISIT (OUTPATIENT)
Dept: MEDICAL GROUP | Facility: PHYSICIAN GROUP | Age: 65
End: 2020-08-17
Payer: COMMERCIAL

## 2020-08-17 VITALS
RESPIRATION RATE: 14 BRPM | WEIGHT: 315 LBS | SYSTOLIC BLOOD PRESSURE: 144 MMHG | HEIGHT: 72 IN | HEART RATE: 100 BPM | DIASTOLIC BLOOD PRESSURE: 82 MMHG | BODY MASS INDEX: 42.66 KG/M2 | OXYGEN SATURATION: 95 % | TEMPERATURE: 98.2 F

## 2020-08-17 DIAGNOSIS — D72.823 LEUKEMOID REACTION: ICD-10-CM

## 2020-08-17 DIAGNOSIS — I10 ESSENTIAL HYPERTENSION: ICD-10-CM

## 2020-08-17 DIAGNOSIS — R73.03 PREDIABETES: ICD-10-CM

## 2020-08-17 PROCEDURE — 99214 OFFICE O/P EST MOD 30 MIN: CPT | Performed by: FAMILY MEDICINE

## 2020-08-17 ASSESSMENT — ENCOUNTER SYMPTOMS
CONSTITUTIONAL NEGATIVE: 1
HEMOPTYSIS: 0
CARDIOVASCULAR NEGATIVE: 1
MYALGIAS: 0
HEADACHES: 0
BLURRED VISION: 0
GASTROINTESTINAL NEGATIVE: 1
PSYCHIATRIC NEGATIVE: 1
PALPITATIONS: 0
EYES NEGATIVE: 1
DOUBLE VISION: 0
MUSCULOSKELETAL NEGATIVE: 1
TINGLING: 0
NAUSEA: 0
RESPIRATORY NEGATIVE: 1
COUGH: 0
HEARTBURN: 0
CHILLS: 0
BRUISES/BLEEDS EASILY: 0
FEVER: 0
NEUROLOGICAL NEGATIVE: 1
DEPRESSION: 0
DIZZINESS: 0

## 2020-08-17 ASSESSMENT — FIBROSIS 4 INDEX: FIB4 SCORE: 0.89

## 2020-08-17 NOTE — PROGRESS NOTES
Subjective:      Car Coyle is a 65 y.o. male who presents with Lab Results            1. Leukemoid reaction  Resolved with repeat cbc, likely due to steroid shot in shoulder from ortho    2. Prediabetes  Due to the patient's issues with glucose management, today they were extensively counseled on a low carb diet and exercise and losing weight  Patient has a diagnosis of obesity. They were counseled today on increasing exercise and  extensively counseled on a low carb diet       3. Essential hypertension  Currently treated for HTN, taking meds with no CP or sob, monitors bp at home periodically. controlled      Past Medical History:  6/13/2012: HDL deficiency  6/13/2012: HTN (hypertension)  6/13/2012: Hyperlipidemia  6/13/2012: Hypertriglyceridemia  6/27/2012: Hypotestosteronism  10/17/2014: Sleep disorder breathing      Comment:  Oct 2014. Abnormal overnight oximetry. 136 desats.    6/27/2012: Vitamin d deficiency  Past Surgical History:  2005: KNEE ARTHROSCOPY      Comment:  right knee  1970s: INCISION HERNIA REPAIR  No date: TONSILLECTOMY  Social History    Tobacco Use      Smoking status: Never Smoker      Smokeless tobacco: Never Used    Alcohol use: Yes      Alcohol/week: 0.0 oz      Frequency: 4 or more times a week      Comment: socially    Drug use: No    Review of patient's family history indicates:  Problem: Heart Failure      Relation: Mother          Age of Onset: (Not Specified)  Problem: Genetic Disorder      Relation: Father          Age of Onset: (Not Specified)          Comment: alzheimer's  Problem: Diabetes      Relation: Sister          Age of Onset: (Not Specified)  Problem: Cancer      Relation: Maternal Aunt          Age of Onset: (Not Specified)          Comment: breast cancer      Current Outpatient Medications: •  amLODIPine (NORVASC) 10 MG Tab, TAKE 1 TABLET DAILY, Disp: 90 Tab, Rfl: 3•  lisinopril (PRINIVIL) 30 MG tablet, TAKE 1 TABLET DAILY, Disp: 90 Tab, Rfl: 3 •   atorvastatin (LIPITOR) 20 MG Tab, TAKE 1 TABLET DAILY, Disp: 90 Tab, Rfl: 3•  azelastine (ASTELIN) 137 MCG/SPRAY nasal spray, Spray 1 Spray in nose 2 times a day. (Patient not taking: Reported on 6/10/2020), Disp: 30 mL, Rfl: 3•  albuterol 108 (90 Base) MCG/ACT Aero Soln inhalation aerosol, Inhale 1-2 Puffs by mouth every four hours as needed for Shortness of Breath., Disp: 1 Inhaler, Rfl: 1•  MULTIPLE VITAMIN PO, Take 1 Tab by mouth every day., Disp: , Rfl: •  SAW PALMETTO, Take  by mouth., Disp: , Rfl: •  Cholecalciferol (VITAMIN D3) 2000 UNIT CAPS, Take 1,000 Units by mouth every day., Disp: , Rfl: •  ibuprofen (MOTRIN) 200 MG TABS, Take 400 mg by mouth every 6 hours as needed., Disp: , Rfl: •  aspirin EC (ECOTRIN) 81 MG TBEC, Take 81 mg by mouth every day., Disp: , Rfl:      Patient was instructed on the use of medications, either prescriptions or OTC and informed on when the appropriate follow up time period should be. In addition, patient was also instructed that should any acute worsening occur that they should notify this clinic asap or call 911.          Review of Systems   Constitutional: Negative.  Negative for chills and fever.   HENT: Negative.  Negative for hearing loss.    Eyes: Negative.  Negative for blurred vision and double vision.   Respiratory: Negative.  Negative for cough and hemoptysis.    Cardiovascular: Negative.  Negative for chest pain and palpitations.   Gastrointestinal: Negative.  Negative for heartburn and nausea.   Genitourinary: Negative.  Negative for dysuria.   Musculoskeletal: Negative.  Negative for myalgias.   Skin: Negative.  Negative for rash.   Neurological: Negative.  Negative for dizziness, tingling and headaches.   Endo/Heme/Allergies: Negative.  Does not bruise/bleed easily.   Psychiatric/Behavioral: Negative.  Negative for depression and suicidal ideas.   All other systems reviewed and are negative.         Objective:     /82 (BP Location: Left arm, Patient  "Position: Sitting, BP Cuff Size: Adult)   Pulse 100   Temp 36.8 °C (98.2 °F) (Temporal)   Resp 14   Ht 1.816 m (5' 11.5\")   Wt (!) 148.3 kg (327 lb)   SpO2 95%   BMI 44.97 kg/m²      Physical Exam  Vitals signs and nursing note reviewed.   Constitutional:       General: He is not in acute distress.     Appearance: He is well-developed. He is not diaphoretic.   HENT:      Head: Normocephalic and atraumatic.      Mouth/Throat:      Pharynx: No oropharyngeal exudate.   Eyes:      Pupils: Pupils are equal, round, and reactive to light.   Cardiovascular:      Rate and Rhythm: Normal rate and regular rhythm.      Heart sounds: Normal heart sounds. No murmur. No friction rub. No gallop.    Pulmonary:      Effort: Pulmonary effort is normal. No respiratory distress.      Breath sounds: Normal breath sounds. No wheezing or rales.   Chest:      Chest wall: No tenderness.   Neurological:      Mental Status: He is alert and oriented to person, place, and time.   Psychiatric:         Behavior: Behavior normal.         Thought Content: Thought content normal.         Judgment: Judgment normal.                 Assessment/Plan:        1. Leukemoid reaction      2. Prediabetes      3. Essential hypertension      "

## 2021-07-19 ENCOUNTER — OFFICE VISIT (OUTPATIENT)
Dept: MEDICAL GROUP | Facility: PHYSICIAN GROUP | Age: 66
End: 2021-07-19
Payer: MEDICARE

## 2021-07-19 VITALS
RESPIRATION RATE: 12 BRPM | TEMPERATURE: 98.3 F | DIASTOLIC BLOOD PRESSURE: 68 MMHG | HEART RATE: 62 BPM | SYSTOLIC BLOOD PRESSURE: 136 MMHG | WEIGHT: 315 LBS | BODY MASS INDEX: 42.66 KG/M2 | OXYGEN SATURATION: 94 % | HEIGHT: 72 IN

## 2021-07-19 DIAGNOSIS — E74.39 GLUCOSE INTOLERANCE: ICD-10-CM

## 2021-07-19 DIAGNOSIS — I10 ESSENTIAL HYPERTENSION: ICD-10-CM

## 2021-07-19 DIAGNOSIS — Z00.00 MEDICARE ANNUAL WELLNESS VISIT, INITIAL: ICD-10-CM

## 2021-07-19 DIAGNOSIS — D72.823 LEUKEMOID REACTION: ICD-10-CM

## 2021-07-19 DIAGNOSIS — E66.01 MORBID OBESITY WITH BMI OF 40.0-44.9, ADULT (HCC): ICD-10-CM

## 2021-07-19 DIAGNOSIS — R73.03 PREDIABETES: ICD-10-CM

## 2021-07-19 DIAGNOSIS — E78.2 MIXED HYPERLIPIDEMIA: ICD-10-CM

## 2021-07-19 PROCEDURE — G0402 INITIAL PREVENTIVE EXAM: HCPCS | Performed by: FAMILY MEDICINE

## 2021-07-19 RX ORDER — AMLODIPINE BESYLATE 10 MG/1
10 TABLET ORAL DAILY
Qty: 90 TABLET | Refills: 3 | Status: SHIPPED | OUTPATIENT
Start: 2021-07-19 | End: 2022-05-04

## 2021-07-19 RX ORDER — LISINOPRIL 30 MG/1
30 TABLET ORAL DAILY
Qty: 90 TABLET | Refills: 3 | Status: SHIPPED | OUTPATIENT
Start: 2021-07-19 | End: 2022-05-04

## 2021-07-19 RX ORDER — ERYTHROMYCIN 5 MG/G
OINTMENT OPHTHALMIC
COMMUNITY
Start: 2021-07-14 | End: 2021-10-04

## 2021-07-19 RX ORDER — KETOROLAC TROMETHAMINE 5 MG/ML
SOLUTION OPHTHALMIC
COMMUNITY
Start: 2021-07-07 | End: 2021-10-04

## 2021-07-19 RX ORDER — PREDNISOLONE ACETATE 10 MG/ML
SUSPENSION/ DROPS OPHTHALMIC
COMMUNITY
Start: 2021-07-07 | End: 2021-10-04

## 2021-07-19 RX ORDER — ATORVASTATIN CALCIUM 20 MG/1
20 TABLET, FILM COATED ORAL DAILY
Qty: 90 TABLET | Refills: 3 | Status: SHIPPED | OUTPATIENT
Start: 2021-07-19 | End: 2022-05-04

## 2021-07-19 ASSESSMENT — ACTIVITIES OF DAILY LIVING (ADL): BATHING_REQUIRES_ASSISTANCE: 0

## 2021-07-19 ASSESSMENT — ENCOUNTER SYMPTOMS: GENERAL WELL-BEING: GOOD

## 2021-07-19 ASSESSMENT — PATIENT HEALTH QUESTIONNAIRE - PHQ9: CLINICAL INTERPRETATION OF PHQ2 SCORE: 0

## 2021-07-19 ASSESSMENT — FIBROSIS 4 INDEX: FIB4 SCORE: 0.9

## 2021-07-19 NOTE — PROGRESS NOTES
Chief Complaint   Patient presents with   • Annual Exam   • Medication Refill     amlodipine, lisinopril, atorvastatin          HPI:  Car is a 66 y.o. here for Medicare Annual Wellness Visit        Patient Active Problem List    Diagnosis Date Noted   • Morbid obesity with BMI of 40.0-44.9, adult (HCC) 01/09/2017   • Sleep disorder breathing 10/17/2014   • Prediabetes 07/28/2014   • BPH (benign prostatic hyperplasia) 08/06/2013   • Vitamin D insufficiency 06/27/2012   • Hypotestosteronism 06/27/2012   • HTN (hypertension) 06/13/2012   • Hyperlipidemia 06/13/2012       Current Outpatient Medications   Medication Sig Dispense Refill   • erythromycin 5 MG/GM Ointment      • ketorolac (ACULAR) 0.5 % Solution      • prednisoLONE acetate (PRED FORTE) 1 % Suspension      • amLODIPine (NORVASC) 10 MG Tab TAKE 1 TABLET DAILY 90 Tab 3   • lisinopril (PRINIVIL) 30 MG tablet TAKE 1 TABLET DAILY 90 Tab 3   • atorvastatin (LIPITOR) 20 MG Tab TAKE 1 TABLET DAILY 90 Tab 3   • albuterol 108 (90 Base) MCG/ACT Aero Soln inhalation aerosol Inhale 1-2 Puffs by mouth every four hours as needed for Shortness of Breath. 1 Inhaler 1   • MULTIPLE VITAMIN PO Take 1 Tab by mouth every day.     • Cholecalciferol (VITAMIN D3) 2000 UNIT CAPS Take 1,000 Units by mouth every day.     • ibuprofen (MOTRIN) 200 MG TABS Take 400 mg by mouth every 6 hours as needed.     • aspirin EC (ECOTRIN) 81 MG TBEC Take 81 mg by mouth every day.     • azelastine (ASTELIN) 137 MCG/SPRAY nasal spray Eunice 1 Spray in nose 2 times a day. (Patient not taking: Reported on 6/10/2020) 30 mL 3   • SAW PALMETTO Take  by mouth. (Patient not taking: Reported on 7/19/2021)       No current facility-administered medications for this visit.        Patient is taking medications as noted in medication list.  Current supplements as per medication list.     Allergies: Patient has no known allergies.    Current social contact/activities:      Is patient current with immunizations?  Yes.    He  reports that he has never smoked. He has never used smokeless tobacco. He reports current alcohol use. He reports that he does not use drugs.  Counseling given: Not Answered        DPA/Advanced directive: Patient does not have an Advanced Directive.  A packet and workshop information was given on Advanced Directives.    ROS:    Gait: Uses no assistive device   Ostomy: No   Other tubes: No  Amputations: No   Chronic oxygen use No   Last eye exam 2021   Wears hearing aids: No   : Denies any urinary leakage during the last 6 months      Screening:        Depression Screening    Little interest or pleasure in doing things?  0 - not at all  Feeling down, depressed, or hopeless? 0 - not at all  Patient Health Questionnaire Score: 0    If depressive symptoms identified deferred to follow up visit unless specifically addressed in assessment and plan.    Interpretation of PHQ-9 Total Score   Score Severity   1-4 No Depression   5-9 Mild Depression   10-14 Moderate Depression   15-19 Moderately Severe Depression   20-27 Severe Depression    Screening for Cognitive Impairment    Three Minute Recall (captain, garden, picture)  2/3    Prince clock face with all 12 numbers and set the hands to show 5 past 8.  Yes    If cognitive concerns identified, deferred for follow up unless specifically addressed in assessment and plan.    Fall Risk Assessment    Has the patient had two or more falls in the last year or any fall with injury in the last year?  No  If fall risk identified, deferred for follow up unless specifically addressed in assessment and plan.    Safety Assessment    Throw rugs on floor.  Yes  Handrails on all stairs.  Yes  Good lighting in all hallways.  Yes  Difficulty hearing.  No  Patient counseled about all safety risks that were identified.    Functional Assessment ADLs    Are there any barriers preventing you from cooking for yourself or meeting nutritional needs?  No.    Are there any barriers  preventing you from driving safely or obtaining transportation?  No.    Are there any barriers preventing you from using a telephone or calling for help?  No.    Are there any barriers preventing you from shopping?  No.    Are there any barriers preventing you from taking care of your own finances?  No.    Are there any barriers preventing you from managing your medications?  No.    Are there any barriers preventing you from showering, bathing or dressing yourself?  No.    Are you currently engaging in any exercise or physical activity?  Yes.     What is your perception of your health?  Good.    Health Maintenance Summary                HEPATITIS C SCREENING Overdue 1955     IMM ZOSTER VACCINES Overdue 7/9/2006     IMM PNEUMOCOCCAL VACCINE: 65+ Years Overdue 7/9/2021     IMM INFLUENZA Next Due 9/1/2021      Done 11/7/2017 Imm Admin: Influenza Vaccine Quad Inj (Pf)    COLONOSCOPY Next Due 11/12/2023      Done 11/12/2013 AMB REFERRAL TO GI FOR COLONOSCOPY    IMM DTaP/Tdap/Td Vaccine Next Due 11/7/2027      Done 11/7/2017 Imm Admin: Tdap Vaccine          Patient Care Team:  Jonathan Reyes M.D. as PCP - General (Family Medicine)  Isaac Hanks M.D. as Consulting Physician (Orthopaedics)    Social History     Tobacco Use   • Smoking status: Never Smoker   • Smokeless tobacco: Never Used   Vaping Use   • Vaping Use: Never used   Substance Use Topics   • Alcohol use: Yes     Alcohol/week: 0.0 oz     Comment: socially   • Drug use: No     Family History   Problem Relation Age of Onset   • Heart Failure Mother    • Genetic Disorder Father         alzheimer's   • Diabetes Sister    • Cancer Maternal Aunt         breast cancer     He  has a past medical history of HDL deficiency (6/13/2012), HTN (hypertension) (6/13/2012), Hyperlipidemia (6/13/2012), Hypertriglyceridemia (6/13/2012), Hypotestosteronism (6/27/2012), Sleep disorder breathing (10/17/2014), and Vitamin d deficiency (6/27/2012).   Past Surgical History:    Procedure Laterality Date   • KNEE ARTHROSCOPY  2005    right knee   • EYE SURGERY      cataract surgery    • INCISION HERNIA REPAIR  1970s   • TONSILLECTOMY             Exam:     /68 (BP Location: Left arm, Patient Position: Sitting, BP Cuff Size: Adult long)   Pulse 62   Temp 36.8 °C (98.3 °F) (Temporal)   Resp 12   Ht 1.829 m (6')   Wt (!) 144 kg (317 lb 11.2 oz)   SpO2 94%  Body mass index is 43.09 kg/m².    Hearing good.    Dentition good  Alert, oriented in no acute distress.  Eye contact is good, speech goal directed, affect calm      Assessment and Plan. The following treatment and monitoring plan is recommended:    1. Medicare annual wellness visit, initial  Patient was advised that one or more vaccines are recommended today as according to their HM. They currently decline all vaccines advised today despite our encouragement    - Comp Metabolic Panel; Future  - HEMOGLOBIN A1C; Future  - Lipid Profile; Future  - CBC WITH DIFFERENTIAL; Future    2. Essential hypertension  Currently treated for HTN, taking meds with no CP or sob, monitors bp at home periodically. controlled      - atorvastatin (LIPITOR) 20 MG Tab; Take 1 tablet by mouth every day.  Dispense: 90 tablet; Refill: 3  - amLODIPine (NORVASC) 10 MG Tab; Take 1 tablet by mouth every day.  Dispense: 90 tablet; Refill: 3  - lisinopril (PRINIVIL) 30 MG tablet; Take 1 tablet by mouth every day.  Dispense: 90 tablet; Refill: 3  - Comp Metabolic Panel; Future  - HEMOGLOBIN A1C; Future  - Lipid Profile; Future  - CBC WITH DIFFERENTIAL; Future    3. Mixed hyperlipidemia  Currently treated for HLD, taking meds with no new myalgias or joint pain, watching fats in diet  controlled      - atorvastatin (LIPITOR) 20 MG Tab; Take 1 tablet by mouth every day.  Dispense: 90 tablet; Refill: 3  - amLODIPine (NORVASC) 10 MG Tab; Take 1 tablet by mouth every day.  Dispense: 90 tablet; Refill: 3  - lisinopril (PRINIVIL) 30 MG tablet; Take 1 tablet by mouth  every day.  Dispense: 90 tablet; Refill: 3  - Comp Metabolic Panel; Future  - HEMOGLOBIN A1C; Future  - Lipid Profile; Future  - CBC WITH DIFFERENTIAL; Future    4. Glucose intolerance  Due to the patient's issues with glucose management, today they were extensively counseled on a low carb diet and exercise and losing weight    - Comp Metabolic Panel; Future  - HEMOGLOBIN A1C; Future  - Lipid Profile; Future  - CBC WITH DIFFERENTIAL; Future    5. Leukemoid reaction  Will check new labs  - Comp Metabolic Panel; Future  - HEMOGLOBIN A1C; Future  - Lipid Profile; Future  - CBC WITH DIFFERENTIAL; Future    6. Morbid obesity with BMI of 40.0-44.9, adult (HCC)  Patient has a diagnosis of obesity. They were counseled today on increasing exercise and  extensively counseled on a low carb diet       - atorvastatin (LIPITOR) 20 MG Tab; Take 1 tablet by mouth every day.  Dispense: 90 tablet; Refill: 3  - amLODIPine (NORVASC) 10 MG Tab; Take 1 tablet by mouth every day.  Dispense: 90 tablet; Refill: 3  - lisinopril (PRINIVIL) 30 MG tablet; Take 1 tablet by mouth every day.  Dispense: 90 tablet; Refill: 3  - Comp Metabolic Panel; Future  - HEMOGLOBIN A1C; Future  - Lipid Profile; Future  - CBC WITH DIFFERENTIAL; Future    7. Prediabetes    - atorvastatin (LIPITOR) 20 MG Tab; Take 1 tablet by mouth every day.  Dispense: 90 tablet; Refill: 3  - amLODIPine (NORVASC) 10 MG Tab; Take 1 tablet by mouth every day.  Dispense: 90 tablet; Refill: 3  - lisinopril (PRINIVIL) 30 MG tablet; Take 1 tablet by mouth every day.  Dispense: 90 tablet; Refill: 3  - Comp Metabolic Panel; Future  - HEMOGLOBIN A1C; Future  - Lipid Profile; Future  - CBC WITH DIFFERENTIAL; Future    Past Medical History:   Diagnosis Date   • HDL deficiency 6/13/2012   • HTN (hypertension) 6/13/2012   • Hyperlipidemia 6/13/2012   • Hypertriglyceridemia 6/13/2012   • Hypotestosteronism 6/27/2012   • Sleep disorder breathing 10/17/2014    Oct 2014. Abnormal overnight  oximetry. 136 desats.     • Vitamin d deficiency 6/27/2012     Past Surgical History:   Procedure Laterality Date   • KNEE ARTHROSCOPY  2005    right knee   • EYE SURGERY      cataract surgery    • INCISION HERNIA REPAIR  1970s   • TONSILLECTOMY       Social History     Tobacco Use   • Smoking status: Never Smoker   • Smokeless tobacco: Never Used   Vaping Use   • Vaping Use: Never used   Substance Use Topics   • Alcohol use: Yes     Alcohol/week: 0.0 oz     Comment: socially   • Drug use: No     Family History   Problem Relation Age of Onset   • Heart Failure Mother    • Genetic Disorder Father         alzheimer's   • Diabetes Sister    • Cancer Maternal Aunt         breast cancer         Current Outpatient Medications:   •  erythromycin 5 MG/GM Ointment, , Disp: , Rfl:   •  ketorolac (ACULAR) 0.5 % Solution, , Disp: , Rfl:   •  prednisoLONE acetate (PRED FORTE) 1 % Suspension, , Disp: , Rfl:   •  atorvastatin (LIPITOR) 20 MG Tab, Take 1 tablet by mouth every day., Disp: 90 tablet, Rfl: 3  •  amLODIPine (NORVASC) 10 MG Tab, Take 1 tablet by mouth every day., Disp: 90 tablet, Rfl: 3  •  lisinopril (PRINIVIL) 30 MG tablet, Take 1 tablet by mouth every day., Disp: 90 tablet, Rfl: 3  •  albuterol 108 (90 Base) MCG/ACT Aero Soln inhalation aerosol, Inhale 1-2 Puffs by mouth every four hours as needed for Shortness of Breath., Disp: 1 Inhaler, Rfl: 1  •  MULTIPLE VITAMIN PO, Take 1 Tab by mouth every day., Disp: , Rfl:   •  Cholecalciferol (VITAMIN D3) 2000 UNIT CAPS, Take 1,000 Units by mouth every day., Disp: , Rfl:   •  ibuprofen (MOTRIN) 200 MG TABS, Take 400 mg by mouth every 6 hours as needed., Disp: , Rfl:   •  aspirin EC (ECOTRIN) 81 MG TBEC, Take 81 mg by mouth every day., Disp: , Rfl:     Patient was instructed on the use of medications, either prescriptions or OTC and informed on when the appropriate follow up time period should be. In addition, patient was also instructed that should any acute worsening occur  that they should notify this clinic asap or call 911.        Services suggested: No services needed at this time  Health Care Screening recommendations as per orders if indicated.  Referrals offered: PT/OT/Nutrition counseling/Behavioral Health/Smoking cessation as per orders if indicated.    Discussion today about general wellness and lifestyle habits:    · Prevent falls and reduce trip hazards; Cautioned about securing or removing rugs.  · Have a working fire alarm and carbon monoxide detector;   · Engage in regular physical activity and social activities       Follow-up: No follow-ups on file.

## 2021-07-21 ENCOUNTER — HOSPITAL ENCOUNTER (OUTPATIENT)
Dept: LAB | Facility: MEDICAL CENTER | Age: 66
End: 2021-07-21
Attending: FAMILY MEDICINE
Payer: MEDICARE

## 2021-07-21 DIAGNOSIS — D72.823 LEUKEMOID REACTION: ICD-10-CM

## 2021-07-21 DIAGNOSIS — E74.39 GLUCOSE INTOLERANCE: ICD-10-CM

## 2021-07-21 DIAGNOSIS — I10 ESSENTIAL HYPERTENSION: ICD-10-CM

## 2021-07-21 DIAGNOSIS — E66.01 MORBID OBESITY WITH BMI OF 40.0-44.9, ADULT (HCC): ICD-10-CM

## 2021-07-21 DIAGNOSIS — R73.03 PREDIABETES: ICD-10-CM

## 2021-07-21 DIAGNOSIS — Z00.00 MEDICARE ANNUAL WELLNESS VISIT, INITIAL: ICD-10-CM

## 2021-07-21 DIAGNOSIS — E78.2 MIXED HYPERLIPIDEMIA: ICD-10-CM

## 2021-07-21 LAB
ALBUMIN SERPL BCP-MCNC: 4.5 G/DL (ref 3.2–4.9)
ALBUMIN/GLOB SERPL: 1.3 G/DL
ALP SERPL-CCNC: 78 U/L (ref 30–99)
ALT SERPL-CCNC: 21 U/L (ref 2–50)
ANION GAP SERPL CALC-SCNC: 11 MMOL/L (ref 7–16)
AST SERPL-CCNC: 17 U/L (ref 12–45)
BASOPHILS # BLD AUTO: 0.8 % (ref 0–1.8)
BASOPHILS # BLD: 0.07 K/UL (ref 0–0.12)
BILIRUB SERPL-MCNC: 0.5 MG/DL (ref 0.1–1.5)
BUN SERPL-MCNC: 19 MG/DL (ref 8–22)
CALCIUM SERPL-MCNC: 9.5 MG/DL (ref 8.5–10.5)
CHLORIDE SERPL-SCNC: 105 MMOL/L (ref 96–112)
CHOLEST SERPL-MCNC: 168 MG/DL (ref 100–199)
CO2 SERPL-SCNC: 19 MMOL/L (ref 20–33)
CREAT SERPL-MCNC: 1.01 MG/DL (ref 0.5–1.4)
EOSINOPHIL # BLD AUTO: 0.27 K/UL (ref 0–0.51)
EOSINOPHIL NFR BLD: 3.3 % (ref 0–6.9)
ERYTHROCYTE [DISTWIDTH] IN BLOOD BY AUTOMATED COUNT: 43 FL (ref 35.9–50)
EST. AVERAGE GLUCOSE BLD GHB EST-MCNC: 123 MG/DL
FASTING STATUS PATIENT QL REPORTED: NORMAL
GLOBULIN SER CALC-MCNC: 3.4 G/DL (ref 1.9–3.5)
GLUCOSE SERPL-MCNC: 103 MG/DL (ref 65–99)
HBA1C MFR BLD: 5.9 % (ref 4–5.6)
HCT VFR BLD AUTO: 49.2 % (ref 42–52)
HDLC SERPL-MCNC: 39 MG/DL
HGB BLD-MCNC: 15.8 G/DL (ref 14–18)
IMM GRANULOCYTES # BLD AUTO: 0.03 K/UL (ref 0–0.11)
IMM GRANULOCYTES NFR BLD AUTO: 0.4 % (ref 0–0.9)
LDLC SERPL CALC-MCNC: 101 MG/DL
LYMPHOCYTES # BLD AUTO: 2.46 K/UL (ref 1–4.8)
LYMPHOCYTES NFR BLD: 29.8 % (ref 22–41)
MCH RBC QN AUTO: 29.5 PG (ref 27–33)
MCHC RBC AUTO-ENTMCNC: 32.1 G/DL (ref 33.7–35.3)
MCV RBC AUTO: 91.8 FL (ref 81.4–97.8)
MONOCYTES # BLD AUTO: 0.67 K/UL (ref 0–0.85)
MONOCYTES NFR BLD AUTO: 8.1 % (ref 0–13.4)
NEUTROPHILS # BLD AUTO: 4.76 K/UL (ref 1.82–7.42)
NEUTROPHILS NFR BLD: 57.6 % (ref 44–72)
NRBC # BLD AUTO: 0 K/UL
NRBC BLD-RTO: 0 /100 WBC
PLATELET # BLD AUTO: 236 K/UL (ref 164–446)
PMV BLD AUTO: 10.8 FL (ref 9–12.9)
POTASSIUM SERPL-SCNC: 4.4 MMOL/L (ref 3.6–5.5)
PROT SERPL-MCNC: 7.9 G/DL (ref 6–8.2)
RBC # BLD AUTO: 5.36 M/UL (ref 4.7–6.1)
SODIUM SERPL-SCNC: 135 MMOL/L (ref 135–145)
TRIGL SERPL-MCNC: 142 MG/DL (ref 0–149)
WBC # BLD AUTO: 8.3 K/UL (ref 4.8–10.8)

## 2021-07-21 PROCEDURE — 80061 LIPID PANEL: CPT

## 2021-07-21 PROCEDURE — 80053 COMPREHEN METABOLIC PANEL: CPT

## 2021-07-21 PROCEDURE — 83036 HEMOGLOBIN GLYCOSYLATED A1C: CPT | Mod: GA

## 2021-07-21 PROCEDURE — 85025 COMPLETE CBC W/AUTO DIFF WBC: CPT

## 2021-07-21 PROCEDURE — 36415 COLL VENOUS BLD VENIPUNCTURE: CPT

## 2021-07-28 ENCOUNTER — PATIENT MESSAGE (OUTPATIENT)
Dept: MEDICAL GROUP | Facility: PHYSICIAN GROUP | Age: 66
End: 2021-07-28

## 2021-08-29 DIAGNOSIS — J06.9 VIRAL URI WITH COUGH: ICD-10-CM

## 2021-08-30 RX ORDER — ALBUTEROL SULFATE 90 UG/1
1-2 AEROSOL, METERED RESPIRATORY (INHALATION) EVERY 4 HOURS PRN
Qty: 18 G | Refills: 3 | Status: SHIPPED | OUTPATIENT
Start: 2021-08-30 | End: 2023-06-05 | Stop reason: SDUPTHER

## 2021-10-04 ENCOUNTER — OFFICE VISIT (OUTPATIENT)
Dept: MEDICAL GROUP | Facility: PHYSICIAN GROUP | Age: 66
End: 2021-10-04
Payer: MEDICARE

## 2021-10-04 VITALS
BODY MASS INDEX: 42.66 KG/M2 | WEIGHT: 315 LBS | HEART RATE: 66 BPM | DIASTOLIC BLOOD PRESSURE: 80 MMHG | RESPIRATION RATE: 16 BRPM | OXYGEN SATURATION: 94 % | SYSTOLIC BLOOD PRESSURE: 140 MMHG | TEMPERATURE: 97.5 F | HEIGHT: 72 IN

## 2021-10-04 DIAGNOSIS — I10 PRIMARY HYPERTENSION: ICD-10-CM

## 2021-10-04 DIAGNOSIS — E78.2 MIXED HYPERLIPIDEMIA: ICD-10-CM

## 2021-10-04 DIAGNOSIS — R00.2 PALPITATIONS: ICD-10-CM

## 2021-10-04 PROCEDURE — 93000 ELECTROCARDIOGRAM COMPLETE: CPT | Performed by: FAMILY MEDICINE

## 2021-10-04 PROCEDURE — 99214 OFFICE O/P EST MOD 30 MIN: CPT | Performed by: FAMILY MEDICINE

## 2021-10-04 ASSESSMENT — ENCOUNTER SYMPTOMS
CONSTITUTIONAL NEGATIVE: 1
BRUISES/BLEEDS EASILY: 0
MUSCULOSKELETAL NEGATIVE: 1
NEUROLOGICAL NEGATIVE: 1
DOUBLE VISION: 0
HEADACHES: 0
COUGH: 0
PSYCHIATRIC NEGATIVE: 1
RESPIRATORY NEGATIVE: 1
DEPRESSION: 0
TINGLING: 0
MYALGIAS: 0
HEARTBURN: 0
BLURRED VISION: 0
DIZZINESS: 0
FEVER: 0
NAUSEA: 0
PALPITATIONS: 1
CHILLS: 0
GASTROINTESTINAL NEGATIVE: 1
HEMOPTYSIS: 0
EYES NEGATIVE: 1

## 2021-10-04 ASSESSMENT — FIBROSIS 4 INDEX: FIB4 SCORE: 1.02

## 2021-10-04 NOTE — PROGRESS NOTES
Subjective     Car Coyle is a 66 y.o. male who presents with Follow-Up (chest fluttering/vibrating feeling x1 week)            Noticed fluttering in his chest at times, mostly at night not related to exercise or food or drink, only lasts for a few seconds  Normal exam and ekg   Recent labs ok  Will get zio    1. Palpitations    - EKG - Clinic Performed  - [facility] ZIO PATCH MONITOR; Future    2. Primary hypertension    - [facility] ZIO PATCH MONITOR; Future    3. Mixed hyperlipidemia  Currently treated for HLD, taking meds with no new myalgias or joint pain, watching fats in diet  controlled      Past Medical History:  6/13/2012: HDL deficiency  6/13/2012: HTN (hypertension)  6/13/2012: Hyperlipidemia  6/13/2012: Hypertriglyceridemia  6/27/2012: Hypotestosteronism  10/17/2014: Sleep disorder breathing      Comment:  Oct 2014. Abnormal overnight oximetry. 136 desats.    6/27/2012: Vitamin d deficiency  Past Surgical History:  2005: KNEE ARTHROSCOPY      Comment:  right knee  No date: EYE SURGERY      Comment:  cataract surgery   1970s: INCISION HERNIA REPAIR  No date: TONSILLECTOMY  Social History    Tobacco Use      Smoking status: Never Smoker      Smokeless tobacco: Never Used    Vaping Use      Vaping Use: Never used    Alcohol use: Yes      Alcohol/week: 0.0 oz      Comment: socially    Drug use: No    Review of patient's family history indicates:  Problem: Heart Failure      Relation: Mother          Age of Onset: (Not Specified)  Problem: Genetic Disorder      Relation: Father          Age of Onset: (Not Specified)          Comment: alzheimer's  Problem: Diabetes      Relation: Sister          Age of Onset: (Not Specified)  Problem: Cancer      Relation: Maternal Aunt          Age of Onset: (Not Specified)          Comment: breast cancer      Current Outpatient Medications: •  albuterol 108 (90 Base) MCG/ACT Aero Soln inhalation aerosol, Inhale 1-2 Puffs every four hours as needed for Shortness of Breath.,  Disp: 18 g, Rfl: 3•  atorvastatin (LIPITOR) 20 MG Tab, Take 1 tablet by mouth every day., Disp: 90 tablet, Rfl: 3•  amLODIPine (NORVASC) 10 MG Tab, Take 1 tablet by mouth every day., Disp: 90 tablet, Rfl: 3•  lisinopril (PRINIVIL) 30 MG tablet, Take 1 tablet by mouth every day., Disp: 90 tablet, Rfl: 3•  MULTIPLE VITAMIN PO, Take 1 Tab by mouth every day., Disp: , Rfl: •  Cholecalciferol (VITAMIN D3) 2000 UNIT CAPS, Take 1,000 Units by mouth every day., Disp: , Rfl: •  ibuprofen (MOTRIN) 200 MG TABS, Take 400 mg by mouth every 6 hours as needed., Disp: , Rfl: •  aspirin EC (ECOTRIN) 81 MG TBEC, Take 81 mg by mouth every day., Disp: , Rfl:      Patient was instructed on the use of medications, either prescriptions or OTC and informed on when the appropriate follow up time period should be. In addition, patient was also instructed that should any acute worsening occur that they should notify this clinic asap or call 911.          Review of Systems   Constitutional: Negative.  Negative for chills and fever.   HENT: Negative.  Negative for hearing loss.    Eyes: Negative.  Negative for blurred vision and double vision.   Respiratory: Negative.  Negative for cough and hemoptysis.    Cardiovascular: Positive for palpitations. Negative for chest pain.   Gastrointestinal: Negative.  Negative for heartburn and nausea.   Genitourinary: Negative.  Negative for dysuria.   Musculoskeletal: Negative.  Negative for myalgias.   Skin: Negative.  Negative for rash.   Neurological: Negative.  Negative for dizziness, tingling and headaches.   Endo/Heme/Allergies: Negative.  Does not bruise/bleed easily.   Psychiatric/Behavioral: Negative.  Negative for depression and suicidal ideas.   All other systems reviewed and are negative.             Objective     /80 (BP Location: Left arm, Patient Position: Sitting, BP Cuff Size: Adult)   Pulse 66   Temp 36.4 °C (97.5 °F) (Temporal)   Resp 16   Ht 1.829 m (6')   Wt (!) 147 kg (324  lb 3.2 oz)   SpO2 94%   BMI 43.97 kg/m²      Physical Exam  Vitals and nursing note reviewed.   Constitutional:       General: He is not in acute distress.     Appearance: He is well-developed. He is not diaphoretic.   HENT:      Head: Normocephalic and atraumatic.      Mouth/Throat:      Pharynx: No oropharyngeal exudate.   Eyes:      Pupils: Pupils are equal, round, and reactive to light.   Cardiovascular:      Rate and Rhythm: Normal rate and regular rhythm.      Heart sounds: Normal heart sounds. No murmur heard.   No friction rub. No gallop.    Pulmonary:      Effort: Pulmonary effort is normal. No respiratory distress.      Breath sounds: Normal breath sounds. No wheezing or rales.   Chest:      Chest wall: No tenderness.   Neurological:      Mental Status: He is alert and oriented to person, place, and time.   Psychiatric:         Behavior: Behavior normal.         Thought Content: Thought content normal.         Judgment: Judgment normal.                             Assessment & Plan        1. Palpitations    - EKG - Clinic Performed  - [facility] ZIO PATCH MONITOR; Future    2. Primary hypertension    - [facility] ZIO PATCH MONITOR; Future    3. Mixed hyperlipidemia

## 2021-10-14 ENCOUNTER — APPOINTMENT (OUTPATIENT)
Dept: CARDIOLOGY | Facility: PHYSICIAN GROUP | Age: 66
End: 2021-10-14
Payer: MEDICARE

## 2021-10-14 ENCOUNTER — TELEPHONE (OUTPATIENT)
Dept: CARDIOLOGY | Facility: PHYSICIAN GROUP | Age: 66
End: 2021-10-14

## 2021-10-14 NOTE — TELEPHONE ENCOUNTER
Patient has been enrolled in the 14 day Zio patch program. Ordered per Dr. Jonathan Reyes. Enrollment was completed under ADD being [facility].   *MAIL OUT*     Patient has been informed in regards to registration being complete. Should be receiving within the next couple days, was instructed to call clinic if it was not received.

## 2021-11-10 DIAGNOSIS — I10 PRIMARY HYPERTENSION: ICD-10-CM

## 2021-11-10 DIAGNOSIS — R00.2 PALPITATIONS: ICD-10-CM

## 2021-11-11 ENCOUNTER — TELEPHONE (OUTPATIENT)
Dept: MEDICAL GROUP | Facility: PHYSICIAN GROUP | Age: 66
End: 2021-11-11

## 2021-11-11 NOTE — TELEPHONE ENCOUNTER
----- Message from Jonathan Reyes M.D. sent at 11/11/2021  9:42 AM PST -----  This patient needs an appointment within the next week. Please schedule this with the patient so we may discuss their lab results

## 2021-11-16 ENCOUNTER — OFFICE VISIT (OUTPATIENT)
Dept: MEDICAL GROUP | Facility: PHYSICIAN GROUP | Age: 66
End: 2021-11-16
Payer: MEDICARE

## 2021-11-16 VITALS
TEMPERATURE: 98 F | HEART RATE: 69 BPM | WEIGHT: 315 LBS | RESPIRATION RATE: 12 BRPM | SYSTOLIC BLOOD PRESSURE: 124 MMHG | HEIGHT: 72 IN | OXYGEN SATURATION: 94 % | DIASTOLIC BLOOD PRESSURE: 78 MMHG | BODY MASS INDEX: 42.66 KG/M2

## 2021-11-16 DIAGNOSIS — R00.2 PALPITATIONS: ICD-10-CM

## 2021-11-16 DIAGNOSIS — I10 PRIMARY HYPERTENSION: ICD-10-CM

## 2021-11-16 DIAGNOSIS — E66.01 MORBID OBESITY WITH BMI OF 40.0-44.9, ADULT (HCC): ICD-10-CM

## 2021-11-16 PROCEDURE — 99214 OFFICE O/P EST MOD 30 MIN: CPT | Performed by: FAMILY MEDICINE

## 2021-11-16 ASSESSMENT — ENCOUNTER SYMPTOMS
CHILLS: 0
RESPIRATORY NEGATIVE: 1
HEMOPTYSIS: 0
PALPITATIONS: 0
BRUISES/BLEEDS EASILY: 0
NAUSEA: 0
TINGLING: 0
CARDIOVASCULAR NEGATIVE: 1
COUGH: 0
NEUROLOGICAL NEGATIVE: 1
DIZZINESS: 0
HEARTBURN: 0
MUSCULOSKELETAL NEGATIVE: 1
CONSTITUTIONAL NEGATIVE: 1
FEVER: 0
BLURRED VISION: 0
HEADACHES: 0
DEPRESSION: 0
EYES NEGATIVE: 1
DOUBLE VISION: 0
MYALGIAS: 0
GASTROINTESTINAL NEGATIVE: 1
PSYCHIATRIC NEGATIVE: 1

## 2021-11-16 ASSESSMENT — FIBROSIS 4 INDEX: FIB4 SCORE: 1.02

## 2021-11-16 NOTE — PROGRESS NOTES
Subjective     Car Coyle is a 66 y.o. male who presents with No chief complaint on file.            1. Palpitations  Resolved and zio patch with no worrisome issues  Will monitor advised on weight loss and caffeine intake and stress    2. Primary hypertension  Currently treated for HTN, taking meds with no CP or sob, monitors bp at home periodically. controlled      3. Morbid obesity with BMI of 40.0-44.9, adult (HCC)  Patient has a diagnosis of obesity. They were counseled today on increasing exercise and  extensively counseled on a low carb diet       Past Medical History:  6/13/2012: HDL deficiency  6/13/2012: HTN (hypertension)  6/13/2012: Hyperlipidemia  6/13/2012: Hypertriglyceridemia  6/27/2012: Hypotestosteronism  10/17/2014: Sleep disorder breathing      Comment:  Oct 2014. Abnormal overnight oximetry. 136 desats.    6/27/2012: Vitamin d deficiency  Past Surgical History:  2005: KNEE ARTHROSCOPY      Comment:  right knee  No date: EYE SURGERY      Comment:  cataract surgery   1970s: INCISION HERNIA REPAIR  No date: TONSILLECTOMY  Social History    Tobacco Use      Smoking status: Never Smoker      Smokeless tobacco: Never Used    Vaping Use      Vaping Use: Never used    Alcohol use: Yes      Alcohol/week: 0.0 oz      Comment: socially    Drug use: No    Review of patient's family history indicates:  Problem: Heart Failure      Relation: Mother          Age of Onset: (Not Specified)  Problem: Genetic Disorder      Relation: Father          Age of Onset: (Not Specified)          Comment: alzheimer's  Problem: Diabetes      Relation: Sister          Age of Onset: (Not Specified)  Problem: Cancer      Relation: Maternal Aunt          Age of Onset: (Not Specified)          Comment: breast cancer      Current Outpatient Medications: •  albuterol 108 (90 Base) MCG/ACT Aero Soln inhalation aerosol, Inhale 1-2 Puffs every four hours as needed for Shortness of Breath., Disp: 18 g, Rfl: 3•  atorvastatin  (LIPITOR) 20 MG Tab, Take 1 tablet by mouth every day., Disp: 90 tablet, Rfl: 3•  amLODIPine (NORVASC) 10 MG Tab, Take 1 tablet by mouth every day., Disp: 90 tablet, Rfl: 3•  lisinopril (PRINIVIL) 30 MG tablet, Take 1 tablet by mouth every day., Disp: 90 tablet, Rfl: 3•  MULTIPLE VITAMIN PO, Take 1 Tab by mouth every day., Disp: , Rfl: •  Cholecalciferol (VITAMIN D3) 2000 UNIT CAPS, Take 1,000 Units by mouth every day., Disp: , Rfl: •  ibuprofen (MOTRIN) 200 MG TABS, Take 400 mg by mouth every 6 hours as needed., Disp: , Rfl: •  aspirin EC (ECOTRIN) 81 MG TBEC, Take 81 mg by mouth every day., Disp: , Rfl:      Patient was instructed on the use of medications, either prescriptions or OTC and informed on when the appropriate follow up time period should be. In addition, patient was also instructed that should any acute worsening occur that they should notify this clinic asap or call 911.          Review of Systems   Constitutional: Negative.  Negative for chills and fever.   HENT: Negative.  Negative for hearing loss.    Eyes: Negative.  Negative for blurred vision and double vision.   Respiratory: Negative.  Negative for cough and hemoptysis.    Cardiovascular: Negative.  Negative for chest pain and palpitations.   Gastrointestinal: Negative.  Negative for heartburn and nausea.   Genitourinary: Negative.  Negative for dysuria.   Musculoskeletal: Negative.  Negative for myalgias.   Skin: Negative.  Negative for rash.   Neurological: Negative.  Negative for dizziness, tingling and headaches.   Endo/Heme/Allergies: Negative.  Does not bruise/bleed easily.   Psychiatric/Behavioral: Negative.  Negative for depression and suicidal ideas.   All other systems reviewed and are negative.             Objective     /78 (BP Location: Left arm, Patient Position: Sitting, BP Cuff Size: Large adult)   Pulse 69   Temp 36.7 °C (98 °F) (Temporal)   Resp 12   Ht 1.829 m (6')   Wt (!) 149 kg (329 lb)   SpO2 94%   BMI 44.62  kg/m²      Physical Exam  Vitals and nursing note reviewed.   Constitutional:       General: He is not in acute distress.     Appearance: He is well-developed. He is not diaphoretic.   HENT:      Head: Normocephalic and atraumatic.      Mouth/Throat:      Pharynx: No oropharyngeal exudate.   Eyes:      Pupils: Pupils are equal, round, and reactive to light.   Cardiovascular:      Rate and Rhythm: Normal rate and regular rhythm.      Heart sounds: Normal heart sounds. No murmur heard.  No friction rub. No gallop.    Pulmonary:      Effort: Pulmonary effort is normal. No respiratory distress.      Breath sounds: Normal breath sounds. No wheezing or rales.   Chest:      Chest wall: No tenderness.   Neurological:      Mental Status: He is alert and oriented to person, place, and time.   Psychiatric:         Behavior: Behavior normal.         Thought Content: Thought content normal.         Judgment: Judgment normal.                             Assessment & Plan        1. Palpitations      2. Primary hypertension      3. Morbid obesity with BMI of 40.0-44.9, adult (HCC)

## 2022-02-07 ENCOUNTER — NON-PROVIDER VISIT (OUTPATIENT)
Dept: MEDICAL GROUP | Facility: PHYSICIAN GROUP | Age: 67
End: 2022-02-07
Payer: MEDICARE

## 2022-02-07 DIAGNOSIS — Z23 NEED FOR VACCINATION: ICD-10-CM

## 2022-02-07 PROCEDURE — 90732 PPSV23 VACC 2 YRS+ SUBQ/IM: CPT | Performed by: FAMILY MEDICINE

## 2022-02-07 PROCEDURE — G0009 ADMIN PNEUMOCOCCAL VACCINE: HCPCS | Performed by: FAMILY MEDICINE

## 2022-05-03 DIAGNOSIS — I10 ESSENTIAL HYPERTENSION: ICD-10-CM

## 2022-05-03 DIAGNOSIS — R73.03 PREDIABETES: ICD-10-CM

## 2022-05-03 DIAGNOSIS — E78.2 MIXED HYPERLIPIDEMIA: ICD-10-CM

## 2022-05-03 DIAGNOSIS — E66.01 MORBID OBESITY WITH BMI OF 40.0-44.9, ADULT (HCC): ICD-10-CM

## 2022-05-04 RX ORDER — LISINOPRIL 30 MG/1
TABLET ORAL
Qty: 90 TABLET | Refills: 3 | Status: SHIPPED | OUTPATIENT
Start: 2022-05-04 | End: 2022-10-20

## 2022-05-04 RX ORDER — AMLODIPINE BESYLATE 10 MG/1
TABLET ORAL
Qty: 90 TABLET | Refills: 3 | Status: SHIPPED | OUTPATIENT
Start: 2022-05-04 | End: 2023-01-17

## 2022-05-04 RX ORDER — ATORVASTATIN CALCIUM 20 MG/1
TABLET, FILM COATED ORAL
Qty: 90 TABLET | Refills: 3 | Status: SHIPPED | OUTPATIENT
Start: 2022-05-04 | End: 2022-10-20

## 2022-06-04 ENCOUNTER — OFFICE VISIT (OUTPATIENT)
Dept: URGENT CARE | Facility: CLINIC | Age: 67
End: 2022-06-04
Payer: MEDICARE

## 2022-06-04 VITALS
TEMPERATURE: 98.2 F | WEIGHT: 315 LBS | SYSTOLIC BLOOD PRESSURE: 128 MMHG | HEIGHT: 72 IN | DIASTOLIC BLOOD PRESSURE: 82 MMHG | RESPIRATION RATE: 14 BRPM | BODY MASS INDEX: 42.66 KG/M2 | OXYGEN SATURATION: 98 % | HEART RATE: 75 BPM

## 2022-06-04 DIAGNOSIS — E66.01 MORBID OBESITY WITH BMI OF 40.0-44.9, ADULT (HCC): ICD-10-CM

## 2022-06-04 DIAGNOSIS — I10 PRIMARY HYPERTENSION: ICD-10-CM

## 2022-06-04 DIAGNOSIS — R73.03 PREDIABETES: ICD-10-CM

## 2022-06-04 DIAGNOSIS — U07.1 COVID-19: ICD-10-CM

## 2022-06-04 PROCEDURE — 99214 OFFICE O/P EST MOD 30 MIN: CPT | Performed by: FAMILY MEDICINE

## 2022-06-04 RX ORDER — DEXAMETHASONE SODIUM PHOSPHATE 4 MG/ML
6 INJECTION, SOLUTION INTRA-ARTICULAR; INTRALESIONAL; INTRAMUSCULAR; INTRAVENOUS; SOFT TISSUE ONCE
Status: COMPLETED | OUTPATIENT
Start: 2022-06-04 | End: 2022-06-04

## 2022-06-04 RX ADMIN — DEXAMETHASONE SODIUM PHOSPHATE 6 MG: 4 INJECTION, SOLUTION INTRA-ARTICULAR; INTRALESIONAL; INTRAMUSCULAR; INTRAVENOUS; SOFT TISSUE at 11:06

## 2022-06-04 ASSESSMENT — FIBROSIS 4 INDEX: FIB4 SCORE: 1.02

## 2022-06-04 NOTE — PROGRESS NOTES
Subjective     Car Coyle is a 66 y.o. male who presents with Coronavirus Screening (Cough, headache, stuffy nose x Wednesday; positive COVID test on Thursday; requesting paxlovid)      - This is a pleasant and nontoxic appearing 66 y.o. male who has come to the walk-in clinic today for:    #1) ~2-3 days ago started to feel tired and mild cough and little headache, did a home covid test and was +. Has had 2 original c19 vaccines but no boosters. No NV/CP/SOB, has had some low grade fever 100    Last GFR >60  7/21    HgA1C 5.9   7/21          ALLERGIES:  Patient has no known allergies.     PMH:  Past Medical History:   Diagnosis Date   • HDL deficiency 6/13/2012   • HTN (hypertension) 6/13/2012   • Hyperlipidemia 6/13/2012   • Hypertriglyceridemia 6/13/2012   • Hypotestosteronism 6/27/2012   • Sleep disorder breathing 10/17/2014    Oct 2014. Abnormal overnight oximetry. 136 desats.     • Vitamin d deficiency 6/27/2012        PSH:  Past Surgical History:   Procedure Laterality Date   • KNEE ARTHROSCOPY  2005    right knee   • EYE SURGERY      cataract surgery    • INCISION HERNIA REPAIR  1970s   • TONSILLECTOMY         MEDS:    Current Outpatient Medications:   •  Nirmatrelvir & Ritonavir 20 x 150 MG & 10 x 100MG Tablet Therapy Pack, Take 300 mg nirmatrelvir (two 150 mg tablets) with 100 mg ritonavir (one 100 mg tablet) by mouth, with all three tablets taken together twice daily for 5 days., Disp: 30 Each, Rfl: 0  •  amLODIPine (NORVASC) 10 MG Tab, TAKE 1 TABLET DAILY, Disp: 90 Tablet, Rfl: 3  •  lisinopril (PRINIVIL) 30 MG tablet, TAKE 1 TABLET DAILY, Disp: 90 Tablet, Rfl: 3  •  atorvastatin (LIPITOR) 20 MG Tab, TAKE 1 TABLET DAILY, Disp: 90 Tablet, Rfl: 3  •  albuterol 108 (90 Base) MCG/ACT Aero Soln inhalation aerosol, Inhale 1-2 Puffs every four hours as needed for Shortness of Breath., Disp: 18 g, Rfl: 3  •  MULTIPLE VITAMIN PO, Take 1 Tab by mouth every day., Disp: , Rfl:   •  Cholecalciferol (VITAMIN  D3) 2000 UNIT CAPS, Take 1,000 Units by mouth every day., Disp: , Rfl:   •  ibuprofen (MOTRIN) 200 MG TABS, Take 400 mg by mouth every 6 hours as needed., Disp: , Rfl:   •  aspirin EC (ECOTRIN) 81 MG TBEC, Take 81 mg by mouth every day., Disp: , Rfl:     Current Facility-Administered Medications:   •  dexamethasone (DECADRON) injection 6 mg, 6 mg, Oral, Once, Timo Lopez M.D.    ** I have documented what I find to be significant in regards to past medical, social, family and surgical history  in my HPI or under PMH/PSH/FH review section, otherwise it is noncontributory **           HPI    Review of Systems   All other systems reviewed and are negative.             Objective     /82 (BP Location: Right arm, Patient Position: Sitting, BP Cuff Size: Large adult)   Pulse 75   Temp 36.8 °C (98.2 °F) (Temporal)   Resp 14   Ht 1.829 m (6')   Wt (!) 143 kg (315 lb)   SpO2 98%   BMI 42.72 kg/m²      Physical Exam  Vitals and nursing note reviewed.   Constitutional:       General: He is not in acute distress.     Appearance: Normal appearance. He is well-developed.   HENT:      Head: Normocephalic.      Mouth/Throat:      Mouth: Mucous membranes are moist.      Pharynx: Oropharynx is clear.   Cardiovascular:      Heart sounds: Normal heart sounds. No murmur heard.  Pulmonary:      Effort: Pulmonary effort is normal. No respiratory distress.      Breath sounds: Normal breath sounds.   Neurological:      Mental Status: He is alert.      Motor: No abnormal muscle tone.   Psychiatric:         Mood and Affect: Mood normal.         Behavior: Behavior normal.           Assessment & Plan         1. COVID-19  Nirmatrelvir & Ritonavir 20 x 150 MG & 10 x 100MG Tablet Therapy Pack    dexamethasone (DECADRON) injection 6 mg   2. Prediabetes     3. Morbid obesity with BMI of 40.0-44.9, adult (HCC)     4. Primary hypertension         - Dx, plan & d/c instructions discussed   - continue daily baby asa  - may add Vit C and  D daily for 2 weeks  - may use albuterol inhaler he has at home to help cough  - Rest, stay hydrated, OTC Motrin and/or Tylenol as needed  - E.R. precautions discussed     Asked to kindly follow up with their PCP's office for a recheck, ER if not improving or feeling/getting worse. If you do not have a primary care doctor and need to schedule one you may call [facility] at [phone] to do this.    Any realistic side effects of medications that may have been given today reviewed.     Patient left in stable condition     Pertinent prior lab work and/or imaging studies in Epic have been reviewed by me today on day of this visit.      Pertinent prior office visit notes in [facility] have been reviewed by me today on day of this visit.

## 2022-08-08 ENCOUNTER — OFFICE VISIT (OUTPATIENT)
Dept: MEDICAL GROUP | Facility: PHYSICIAN GROUP | Age: 67
End: 2022-08-08
Payer: MEDICARE

## 2022-08-08 VITALS
BODY MASS INDEX: 42.66 KG/M2 | HEART RATE: 82 BPM | WEIGHT: 315 LBS | DIASTOLIC BLOOD PRESSURE: 82 MMHG | SYSTOLIC BLOOD PRESSURE: 136 MMHG | RESPIRATION RATE: 16 BRPM | HEIGHT: 72 IN | TEMPERATURE: 97.8 F | OXYGEN SATURATION: 96 %

## 2022-08-08 DIAGNOSIS — M25.561 PAIN IN BOTH KNEES, UNSPECIFIED CHRONICITY: ICD-10-CM

## 2022-08-08 DIAGNOSIS — R07.89 ATYPICAL CHEST PAIN: ICD-10-CM

## 2022-08-08 DIAGNOSIS — E66.01 MORBID OBESITY WITH BMI OF 40.0-44.9, ADULT (HCC): ICD-10-CM

## 2022-08-08 DIAGNOSIS — R79.9 ABNORMAL FINDING OF BLOOD CHEMISTRY, UNSPECIFIED: ICD-10-CM

## 2022-08-08 DIAGNOSIS — M25.562 PAIN IN BOTH KNEES, UNSPECIFIED CHRONICITY: ICD-10-CM

## 2022-08-08 PROCEDURE — 99214 OFFICE O/P EST MOD 30 MIN: CPT | Mod: 25 | Performed by: FAMILY MEDICINE

## 2022-08-08 PROCEDURE — 93000 ELECTROCARDIOGRAM COMPLETE: CPT | Performed by: FAMILY MEDICINE

## 2022-08-08 RX ORDER — FLUTICASONE PROPIONATE 50 MCG
1 SPRAY, SUSPENSION (ML) NASAL DAILY
COMMUNITY

## 2022-08-08 ASSESSMENT — ENCOUNTER SYMPTOMS
CONSTITUTIONAL NEGATIVE: 1
BLURRED VISION: 0
DIZZINESS: 0
PALPITATIONS: 0
EYES NEGATIVE: 1
PSYCHIATRIC NEGATIVE: 1
TINGLING: 0
NAUSEA: 1
HEADACHES: 0
BRUISES/BLEEDS EASILY: 0
DOUBLE VISION: 0
HEMOPTYSIS: 0
FEVER: 0
MYALGIAS: 0
RESPIRATORY NEGATIVE: 1
NEUROLOGICAL NEGATIVE: 1
CHILLS: 0
HEARTBURN: 0
DEPRESSION: 0
COUGH: 0

## 2022-08-08 ASSESSMENT — PATIENT HEALTH QUESTIONNAIRE - PHQ9: CLINICAL INTERPRETATION OF PHQ2 SCORE: 0

## 2022-08-08 ASSESSMENT — FIBROSIS 4 INDEX: FIB4 SCORE: 1.04

## 2022-08-08 NOTE — PROGRESS NOTES
Subjective     Car Coyle is a 67 y.o. male who presents with Follow-Up (Discomfort in middle chest area/aggravated while eating)            1. Morbid obesity with BMI of 40.0-44.9, adult (HCC)     Lipid Profile; Future   HEMOGLOBIN A1C; Future   Comp Metabolic Panel; Future    2. Atypical chest pain  Pressure type pain in the sternum with some nausea. Mostly after eating and got better with antacids. Likely gerd and ekg unremarkable but will get stress test for eval   EC-ECHOCARDIOGRAM DOBUTAMINE REST/STRESS W/ CONT; Future   EKG - Clinic Performed   Lipid Profile; Future   HEMOGLOBIN A1C; Future   Comp Metabolic Panel; Future    3. Pain in both knees, unspecified chronicity  H/o left meniscus tear, pain with use   Referral to Orthopedics   Lipid Profile; Future   HEMOGLOBIN A1C; Future   Comp Metabolic Panel; Future    4. Abnormal finding of blood chemistry, unspecified      HEMOGLOBIN A1C; Future    Past Medical History:  6/13/2012: HDL deficiency  6/13/2012: HTN (hypertension)  6/13/2012: Hyperlipidemia  6/13/2012: Hypertriglyceridemia  6/27/2012: Hypotestosteronism  10/17/2014: Sleep disorder breathing      Comment:  Oct 2014. Abnormal overnight oximetry. 136 desats.    6/27/2012: Vitamin d deficiency  Past Surgical History:  2005: KNEE ARTHROSCOPY      Comment:  right knee  No date: EYE SURGERY      Comment:  cataract surgery   1970s: INCISION HERNIA REPAIR  No date: TONSILLECTOMY  Social History    Tobacco Use      Smoking status: Never Smoker      Smokeless tobacco: Never Used    Vaping Use      Vaping Use: Never used    Alcohol use: Yes      Alcohol/week: 0.0 oz      Comment: socially    Drug use: No    Review of patient's family history indicates:  Problem: Heart Failure      Relation: Mother          Age of Onset: (Not Specified)  Problem: Genetic Disorder      Relation: Father          Age of Onset: (Not Specified)          Comment: alzheimer's  Problem: Diabetes      Relation: Sister           Age of Onset: (Not Specified)  Problem: Cancer      Relation: Maternal Aunt          Age of Onset: (Not Specified)          Comment: breast cancer      Current Outpatient Medications: •  fluticasone (FLONASE) 50 MCG/ACT nasal spray, Administer 1 Spray into affected nostril(S) every day., Disp: , Rfl: •  amLODIPine (NORVASC) 10 MG Tab, TAKE 1 TABLET DAILY, Disp: 90 Tablet, Rfl: 3•  lisinopril (PRINIVIL) 30 MG tablet, TAKE 1 TABLET DAILY, Disp: 90 Tablet, Rfl: 3•  atorvastatin (LIPITOR) 20 MG Tab, TAKE 1 TABLET DAILY, Disp: 90 Tablet, Rfl: 3•  albuterol 108 (90 Base) MCG/ACT Aero Soln inhalation aerosol, Inhale 1-2 Puffs every four hours as needed for Shortness of Breath., Disp: 18 g, Rfl: 3•  MULTIPLE VITAMIN PO, Take 1 Tab by mouth every day., Disp: , Rfl: •  Cholecalciferol (VITAMIN D3) 2000 UNIT CAPS, Take 1,000 Units by mouth every day., Disp: , Rfl: •  ibuprofen (MOTRIN) 200 MG TABS, Take 400 mg by mouth every 6 hours as needed., Disp: , Rfl: •  aspirin EC (ECOTRIN) 81 MG TBEC, Take 81 mg by mouth every day., Disp: , Rfl:      Patient was instructed on the use of medications, either prescriptions or OTC and informed on when the appropriate follow up time period should be. In addition, patient was also instructed that should any acute worsening occur that they should notify this clinic asap or call 911.          Review of Systems   Constitutional: Negative.  Negative for chills and fever.   HENT: Negative.  Negative for hearing loss.    Eyes: Negative.  Negative for blurred vision and double vision.   Respiratory: Negative.  Negative for cough and hemoptysis.    Cardiovascular: Positive for chest pain. Negative for palpitations.   Gastrointestinal: Positive for nausea. Negative for heartburn.   Genitourinary: Negative.  Negative for dysuria.   Musculoskeletal: Positive for joint pain. Negative for myalgias.   Skin: Negative.  Negative for rash.   Neurological: Negative.  Negative for dizziness, tingling and  headaches.   Endo/Heme/Allergies: Negative.  Does not bruise/bleed easily.   Psychiatric/Behavioral: Negative.  Negative for depression and suicidal ideas.   All other systems reviewed and are negative.             Objective     /82 (BP Location: Left arm, Patient Position: Sitting, BP Cuff Size: Large adult)   Pulse 82   Temp 36.6 °C (97.8 °F) (Temporal)   Resp 16   Ht 1.829 m (6')   Wt (!) 149 kg (328 lb)   SpO2 96%   BMI 44.48 kg/m²      Physical Exam  Vitals and nursing note reviewed.   Constitutional:       General: He is not in acute distress.     Appearance: He is well-developed. He is not diaphoretic.   HENT:      Head: Normocephalic and atraumatic.      Mouth/Throat:      Pharynx: No oropharyngeal exudate.   Eyes:      Pupils: Pupils are equal, round, and reactive to light.   Cardiovascular:      Rate and Rhythm: Normal rate and regular rhythm.      Heart sounds: Normal heart sounds. No murmur heard.    No friction rub. No gallop.   Pulmonary:      Effort: Pulmonary effort is normal. No respiratory distress.      Breath sounds: Normal breath sounds. No wheezing or rales.   Chest:      Chest wall: No tenderness.   Neurological:      Mental Status: He is alert and oriented to person, place, and time.   Psychiatric:         Behavior: Behavior normal.         Thought Content: Thought content normal.         Judgment: Judgment normal.                             Assessment & Plan        1. Morbid obesity with BMI of 40.0-44.9, adult ([facility])    - Lipid Profile; Future  - HEMOGLOBIN A1C; Future  - Comp Metabolic Panel; Future    2. Atypical chest pain    - EC-ECHOCARDIOGRAM DOBUTAMINE REST/STRESS W/ CONT; Future  - EKG - Clinic Performed  - Lipid Profile; Future  - HEMOGLOBIN A1C; Future  - Comp Metabolic Panel; Future    3. Pain in both knees, unspecified chronicity    - Referral to Orthopedics  - Lipid Profile; Future  - HEMOGLOBIN A1C; Future  - Comp Metabolic Panel; Future    4. Abnormal finding of  blood chemistry, unspecified     - HEMOGLOBIN A1C; Future

## 2022-08-15 LAB — HBA1C MFR BLD: 6.1 % (ref 0–5.6)

## 2022-08-29 ENCOUNTER — TELEPHONE (OUTPATIENT)
Dept: CARDIOLOGY | Facility: MEDICAL CENTER | Age: 67
End: 2022-08-29

## 2022-08-29 ENCOUNTER — TELEMEDICINE (OUTPATIENT)
Dept: MEDICAL GROUP | Facility: PHYSICIAN GROUP | Age: 67
End: 2022-08-29
Payer: MEDICARE

## 2022-08-29 VITALS — HEIGHT: 72 IN | WEIGHT: 315 LBS | BODY MASS INDEX: 42.66 KG/M2

## 2022-08-29 DIAGNOSIS — E66.01 MORBID OBESITY WITH BMI OF 40.0-44.9, ADULT (HCC): ICD-10-CM

## 2022-08-29 DIAGNOSIS — I20.89 ATYPICAL ANGINA (HCC): ICD-10-CM

## 2022-08-29 DIAGNOSIS — I45.4 IVCD (INTRAVENTRICULAR CONDUCTION DEFECT): ICD-10-CM

## 2022-08-29 DIAGNOSIS — I10 PRIMARY HYPERTENSION: ICD-10-CM

## 2022-08-29 DIAGNOSIS — R73.03 PREDIABETES: ICD-10-CM

## 2022-08-29 DIAGNOSIS — E78.2 MIXED HYPERLIPIDEMIA: ICD-10-CM

## 2022-08-29 PROCEDURE — 99214 OFFICE O/P EST MOD 30 MIN: CPT | Mod: 95 | Performed by: FAMILY MEDICINE

## 2022-08-29 ASSESSMENT — FIBROSIS 4 INDEX: FIB4 SCORE: 1.04

## 2022-08-29 NOTE — TELEPHONE ENCOUNTER
Dr. Reyes agreeable to canceling dobutamine stress echo and pursuing alternative ischemic work-up testing.  Updated patient regarding alternative cardiac testing and lieu of dobutamine stress echo.    Patient agreeable to cancel dobutamine stress echo this Wednesday.  Will order cardiac PET.  Scheduling to reach out to patient to coordinate.

## 2022-08-29 NOTE — PROGRESS NOTES
Virtual Visit: Established Patient   This visit was conducted via Zoom using secure and encrypted videoconferencing technology.   The patient was in their home in the [facility].    The patient's identity was confirmed and verbal consent was obtained for this virtual visit.     Subjective:   CC:   Chief Complaint   Patient presents with    Lab Results     In Media        Car Coyle is a 67 y.o. male presenting for evaluation and management of:    1. Morbid obesity with BMI of 40.0-44.9, adult (HCC)    - Patient identified as having weight management issue.  Appropriate orders and counseling given.    2. Prediabetes  Due to the patient's issues with glucose management, today they were extensively counseled on a low carb diet and exercise and losing weight  Repeat a1c in 3 mo    Past Medical History:   Diagnosis Date    HDL deficiency 6/13/2012    HTN (hypertension) 6/13/2012    Hyperlipidemia 6/13/2012    Hypertriglyceridemia 6/13/2012    Hypotestosteronism 6/27/2012    Sleep disorder breathing 10/17/2014    Oct 2014. Abnormal overnight oximetry. 136 desats.      Vitamin d deficiency 6/27/2012     Past Surgical History:   Procedure Laterality Date    KNEE ARTHROSCOPY  2005    right knee    EYE SURGERY      cataract surgery     INCISION HERNIA REPAIR  1970s    TONSILLECTOMY         Social History     Tobacco Use    Smoking status: Never    Smokeless tobacco: Never   Vaping Use    Vaping Use: Never used   Substance Use Topics    Alcohol use: Yes     Alcohol/week: 0.0 oz     Comment: socially    Drug use: No       Family History   Problem Relation Age of Onset    Heart Failure Mother     Genetic Disorder Father         alzheimer's    Diabetes Sister     Cancer Maternal Aunt         breast cancer         Current Outpatient Medications:     fluticasone (FLONASE) 50 MCG/ACT nasal spray, Administer 1 Spray into affected nostril(S) every day., Disp: , Rfl:     amLODIPine (NORVASC) 10 MG Tab, TAKE 1 TABLET  DAILY, Disp: 90 Tablet, Rfl: 3    lisinopril (PRINIVIL) 30 MG tablet, TAKE 1 TABLET DAILY, Disp: 90 Tablet, Rfl: 3    atorvastatin (LIPITOR) 20 MG Tab, TAKE 1 TABLET DAILY, Disp: 90 Tablet, Rfl: 3    albuterol 108 (90 Base) MCG/ACT Aero Soln inhalation aerosol, Inhale 1-2 Puffs every four hours as needed for Shortness of Breath., Disp: 18 g, Rfl: 3    MULTIPLE VITAMIN PO, Take 1 Tab by mouth every day., Disp: , Rfl:     Cholecalciferol (VITAMIN D3) 2000 UNIT CAPS, Take 1,000 Units by mouth every day., Disp: , Rfl:     ibuprofen (MOTRIN) 200 MG TABS, Take 400 mg by mouth every 6 hours as needed., Disp: , Rfl:     aspirin EC (ECOTRIN) 81 MG TBEC, Take 81 mg by mouth every day., Disp: , Rfl:     Patient was instructed on the use of medications, either prescriptions or OTC and informed on when the appropriate follow up time period should be. In addition, patient was also instructed that should any acute worsening occur that they should notify this clinic asap or call 911.        ROS       Current medicines (including changes today)  Current Outpatient Medications   Medication Sig Dispense Refill    fluticasone (FLONASE) 50 MCG/ACT nasal spray Administer 1 Spray into affected nostril(S) every day.      amLODIPine (NORVASC) 10 MG Tab TAKE 1 TABLET DAILY 90 Tablet 3    lisinopril (PRINIVIL) 30 MG tablet TAKE 1 TABLET DAILY 90 Tablet 3    atorvastatin (LIPITOR) 20 MG Tab TAKE 1 TABLET DAILY 90 Tablet 3    albuterol 108 (90 Base) MCG/ACT Aero Soln inhalation aerosol Inhale 1-2 Puffs every four hours as needed for Shortness of Breath. 18 g 3    MULTIPLE VITAMIN PO Take 1 Tab by mouth every day.      Cholecalciferol (VITAMIN D3) 2000 UNIT CAPS Take 1,000 Units by mouth every day.      ibuprofen (MOTRIN) 200 MG TABS Take 400 mg by mouth every 6 hours as needed.      aspirin EC (ECOTRIN) 81 MG TBEC Take 81 mg by mouth every day.       No current facility-administered medications for this visit.       Patient Active Problem List     Diagnosis Date Noted    Morbid obesity with BMI of 40.0-44.9, adult ([facility]) 01/09/2017    Sleep disorder breathing 10/17/2014    Prediabetes 07/28/2014    BPH (benign prostatic hyperplasia) 08/06/2013    Vitamin D insufficiency 06/27/2012    Hypotestosteronism 06/27/2012    HTN (hypertension) 06/13/2012    Hyperlipidemia 06/13/2012        Objective:   Ht 1.829 m (6')   Wt (!) 145 kg (320 lb)   BMI 43.40 kg/m²     Physical Exam:  Constitutional: Alert, no distress, well-groomed.  Skin: No rashes in visible areas.  Eye: Round. Conjunctiva clear, lids normal. No icterus.   ENMT: Lips pink without lesions, good dentition, moist mucous membranes. Phonation normal.  Neck: No masses, no thyromegaly. Moves freely without pain.  Respiratory: Unlabored respiratory effort, no cough or audible wheeze  Psych: Alert and oriented x3, normal affect and mood.     Assessment and Plan:   The following treatment plan was discussed:     1. Morbid obesity with BMI of 40.0-44.9, adult ([facility])  - Patient identified as having weight management issue.  Appropriate orders and counseling given.    2. Prediabetes      Follow-up: No follow-ups on file.

## 2022-08-31 ENCOUNTER — APPOINTMENT (OUTPATIENT)
Dept: CARDIOLOGY | Facility: MEDICAL CENTER | Age: 67
End: 2022-08-31
Attending: FAMILY MEDICINE
Payer: MEDICARE

## 2022-09-16 ENCOUNTER — HOSPITAL ENCOUNTER (OUTPATIENT)
Dept: RADIOLOGY | Facility: MEDICAL CENTER | Age: 67
End: 2022-09-16
Attending: NURSE PRACTITIONER
Payer: MEDICARE

## 2022-09-16 DIAGNOSIS — I45.4 IVCD (INTRAVENTRICULAR CONDUCTION DEFECT): ICD-10-CM

## 2022-09-16 DIAGNOSIS — E66.01 MORBID OBESITY WITH BMI OF 40.0-44.9, ADULT (HCC): ICD-10-CM

## 2022-09-16 DIAGNOSIS — I20.89 ATYPICAL ANGINA (HCC): ICD-10-CM

## 2022-09-16 DIAGNOSIS — R73.03 PREDIABETES: ICD-10-CM

## 2022-09-16 DIAGNOSIS — E78.2 MIXED HYPERLIPIDEMIA: ICD-10-CM

## 2022-09-16 DIAGNOSIS — I10 PRIMARY HYPERTENSION: ICD-10-CM

## 2022-09-16 PROCEDURE — 78431 MYOCRD IMG PET RST&STRS CT: CPT

## 2022-09-17 PROCEDURE — 93018 CV STRESS TEST I&R ONLY: CPT | Performed by: INTERNAL MEDICINE

## 2022-09-17 PROCEDURE — 78431 MYOCRD IMG PET RST&STRS CT: CPT | Mod: 26 | Performed by: INTERNAL MEDICINE

## 2022-09-19 ENCOUNTER — OFFICE VISIT (OUTPATIENT)
Dept: MEDICAL GROUP | Facility: PHYSICIAN GROUP | Age: 67
End: 2022-09-19
Payer: MEDICARE

## 2022-09-19 VITALS
OXYGEN SATURATION: 97 % | BODY MASS INDEX: 42.66 KG/M2 | TEMPERATURE: 96.9 F | WEIGHT: 315 LBS | DIASTOLIC BLOOD PRESSURE: 70 MMHG | SYSTOLIC BLOOD PRESSURE: 128 MMHG | RESPIRATION RATE: 20 BRPM | HEIGHT: 72 IN | HEART RATE: 63 BPM

## 2022-09-19 DIAGNOSIS — E66.01 MORBID OBESITY WITH BMI OF 40.0-44.9, ADULT (HCC): ICD-10-CM

## 2022-09-19 DIAGNOSIS — R07.89 ATYPICAL CHEST PAIN: ICD-10-CM

## 2022-09-19 DIAGNOSIS — R94.39 POSITIVE CARDIAC STRESS TEST: ICD-10-CM

## 2022-09-19 PROCEDURE — 99214 OFFICE O/P EST MOD 30 MIN: CPT | Performed by: FAMILY MEDICINE

## 2022-09-19 ASSESSMENT — ENCOUNTER SYMPTOMS
HEMOPTYSIS: 0
RESPIRATORY NEGATIVE: 1
COUGH: 0
NAUSEA: 0
PALPITATIONS: 0
CHILLS: 0
PSYCHIATRIC NEGATIVE: 1
DOUBLE VISION: 0
MYALGIAS: 0
DIZZINESS: 0
BRUISES/BLEEDS EASILY: 0
NEUROLOGICAL NEGATIVE: 1
FEVER: 0
CARDIOVASCULAR NEGATIVE: 1
CONSTITUTIONAL NEGATIVE: 1
HEARTBURN: 0
DEPRESSION: 0
TINGLING: 0
HEADACHES: 0
GASTROINTESTINAL NEGATIVE: 1
BLURRED VISION: 0
EYES NEGATIVE: 1

## 2022-09-19 ASSESSMENT — FIBROSIS 4 INDEX: FIB4 SCORE: 1.04

## 2022-09-19 NOTE — PROGRESS NOTES
Subjective     Car Coyle is a 67 y.o. male who presents with Follow-Up (Cardiac pet results )            1. Atypical chest pain  Stress test with reversible apical component seen, will have him seen by cardiology and continue to work on risk factors, on statin and asa, good bs and bp control and ldl at 108. Biggest issue for this and for his knee issues is his weight which he knows he needs to work on   REFERRAL TO CARDIOLOGY    2. Positive cardiac stress test     REFERRAL TO CARDIOLOGY    3. Morbid obesity with BMI of 40.0-44.9, adult ([facility])      Past Medical History:  6/13/2012: HDL deficiency  6/13/2012: HTN (hypertension)  6/13/2012: Hyperlipidemia  6/13/2012: Hypertriglyceridemia  6/27/2012: Hypotestosteronism  10/17/2014: Sleep disorder breathing      Comment:  Oct 2014. Abnormal overnight oximetry. 136 desats.    6/27/2012: Vitamin d deficiency  Past Surgical History:  2005: KNEE ARTHROSCOPY      Comment:  right knee  No date: EYE SURGERY      Comment:  cataract surgery   1970s: INCISION HERNIA REPAIR  No date: TONSILLECTOMY  Social History    Tobacco Use      Smoking status: Never      Smokeless tobacco: Never    Vaping Use      Vaping Use: Never used    Alcohol use: Yes      Alcohol/week: 0.0 oz      Comment: socially    Drug use: No    Review of patient's family history indicates:  Problem: Heart Failure      Relation: Mother          Age of Onset: (Not Specified)  Problem: Genetic Disorder      Relation: Father          Age of Onset: (Not Specified)          Comment: alzheimer's  Problem: Diabetes      Relation: Sister          Age of Onset: (Not Specified)  Problem: Cancer      Relation: Maternal Aunt          Age of Onset: (Not Specified)          Comment: breast cancer      Current Outpatient Medications: ·  meloxicam (MOBIC) 15 MG tablet, Take 1 Tablet by mouth every day., Disp: 30 Tablet, Rfl: 6·  fluticasone (FLONASE) 50 MCG/ACT nasal spray, Administer 1 Spray into affected nostril(S)  every day., Disp: , Rfl: ·  amLODIPine (NORVASC) 10 MG Tab, TAKE 1 TABLET DAILY, Disp: 90 Tablet, Rfl: 3·  lisinopril (PRINIVIL) 30 MG tablet, TAKE 1 TABLET DAILY, Disp: 90 Tablet, Rfl: 3·  atorvastatin (LIPITOR) 20 MG Tab, TAKE 1 TABLET DAILY, Disp: 90 Tablet, Rfl: 3·  albuterol 108 (90 Base) MCG/ACT Aero Soln inhalation aerosol, Inhale 1-2 Puffs every four hours as needed for Shortness of Breath., Disp: 18 g, Rfl: 3·  MULTIPLE VITAMIN PO, Take 1 Tab by mouth every day., Disp: , Rfl: ·  Cholecalciferol (VITAMIN D3) 2000 UNIT CAPS, Take 1,000 Units by mouth every day., Disp: , Rfl: ·  ibuprofen (MOTRIN) 200 MG TABS, Take 400 mg by mouth every 6 hours as needed., Disp: , Rfl: ·  aspirin EC (ECOTRIN) 81 MG TBEC, Take 81 mg by mouth every day., Disp: , Rfl:     Patient was instructed on the use of medications, either prescriptions or OTC and informed on when the appropriate follow up time period should be. In addition, patient was also instructed that should any acute worsening occur that they should notify this clinic asap or call 911.            Review of Systems   Constitutional: Negative.  Negative for chills and fever.   HENT: Negative.  Negative for hearing loss.    Eyes: Negative.  Negative for blurred vision and double vision.   Respiratory: Negative.  Negative for cough and hemoptysis.    Cardiovascular: Negative.  Negative for chest pain and palpitations.   Gastrointestinal: Negative.  Negative for heartburn and nausea.   Genitourinary: Negative.  Negative for dysuria.   Musculoskeletal:  Positive for joint pain. Negative for myalgias.   Skin: Negative.  Negative for rash.   Neurological: Negative.  Negative for dizziness, tingling and headaches.   Endo/Heme/Allergies: Negative.  Does not bruise/bleed easily.   Psychiatric/Behavioral: Negative.  Negative for depression and suicidal ideas.    All other systems reviewed and are negative.           Objective     /70   Pulse 63   Temp 36.1 °C (96.9 °F)  (Temporal)   Resp 20   Ht 1.829 m (6')   Wt (!) 146 kg (322 lb 9.6 oz)   SpO2 97%   BMI 43.75 kg/m²      Physical Exam  Vitals and nursing note reviewed.   Constitutional:       General: He is not in acute distress.     Appearance: He is well-developed. He is not diaphoretic.   HENT:      Head: Normocephalic and atraumatic.      Mouth/Throat:      Pharynx: No oropharyngeal exudate.   Eyes:      Pupils: Pupils are equal, round, and reactive to light.   Cardiovascular:      Rate and Rhythm: Normal rate and regular rhythm.      Heart sounds: Normal heart sounds. No murmur heard.    No friction rub. No gallop.   Pulmonary:      Effort: Pulmonary effort is normal. No respiratory distress.      Breath sounds: Normal breath sounds. No wheezing or rales.   Chest:      Chest wall: No tenderness.   Neurological:      Mental Status: He is alert and oriented to person, place, and time.   Psychiatric:         Behavior: Behavior normal.         Thought Content: Thought content normal.         Judgment: Judgment normal.                           Assessment & Plan        1. Atypical chest pain    - REFERRAL TO CARDIOLOGY    2. Positive cardiac stress test    - REFERRAL TO CARDIOLOGY    3. Morbid obesity with BMI of 40.0-44.9, adult ([facility])

## 2022-09-20 ENCOUNTER — PATIENT MESSAGE (OUTPATIENT)
Dept: CARDIOLOGY | Facility: MEDICAL CENTER | Age: 67
End: 2022-09-20
Payer: MEDICARE

## 2022-09-20 NOTE — TELEPHONE ENCOUNTER
----- Message from ANTHONY Sandy sent at 9/19/2022 12:29 PM PDT -----  Needs to be scheduled ASAP with any provider to establish with cardiology clinic and to discuss left heart catheterization.  PCP already placed referral  ----- Message -----  From: Kenzie Carpio R.N.  Sent: 9/19/2022   8:52 AM PDT  To: Kenzie Carpio R.N., #    To JG, Please advise on what to say to pt, thank you!    No [facility] scheduled, thank you!

## 2022-09-20 NOTE — PATIENT COMMUNICATION
Task deferred to schedulers to schedule for [facility] via staff message, urgent.    [employer] message sent to pt regarding APRN recommendations.

## 2022-09-21 ENCOUNTER — TELEPHONE (OUTPATIENT)
Dept: CARDIOLOGY | Facility: MEDICAL CENTER | Age: 67
End: 2022-09-21
Payer: MEDICARE

## 2022-09-21 NOTE — TELEPHONE ENCOUNTER
----- Message from Kenzie Carpio R.N. sent at 9/20/2022  8:56 AM PDT -----  Per JG, please schedule pt to discuss PET scan results and heart cath ASAP with any provider to establish care.  Per JG PCP placed referral for pt to be seen by us.  Thank you!

## 2022-10-20 ENCOUNTER — TELEPHONE (OUTPATIENT)
Dept: CARDIOLOGY | Facility: MEDICAL CENTER | Age: 67
End: 2022-10-20

## 2022-10-20 ENCOUNTER — OFFICE VISIT (OUTPATIENT)
Dept: CARDIOLOGY | Facility: MEDICAL CENTER | Age: 67
End: 2022-10-20
Payer: MEDICARE

## 2022-10-20 VITALS
RESPIRATION RATE: 20 BRPM | OXYGEN SATURATION: 96 % | WEIGHT: 315 LBS | HEART RATE: 62 BPM | BODY MASS INDEX: 42.66 KG/M2 | DIASTOLIC BLOOD PRESSURE: 78 MMHG | SYSTOLIC BLOOD PRESSURE: 154 MMHG | HEIGHT: 72 IN

## 2022-10-20 DIAGNOSIS — I10 ESSENTIAL HYPERTENSION: ICD-10-CM

## 2022-10-20 DIAGNOSIS — E78.2 MIXED HYPERLIPIDEMIA: ICD-10-CM

## 2022-10-20 DIAGNOSIS — I45.10 INCOMPLETE RBBB: ICD-10-CM

## 2022-10-20 DIAGNOSIS — R07.89 OTHER CHEST PAIN: ICD-10-CM

## 2022-10-20 DIAGNOSIS — E66.01 MORBID OBESITY WITH BMI OF 40.0-44.9, ADULT (HCC): ICD-10-CM

## 2022-10-20 DIAGNOSIS — E66.01 CLASS 3 SEVERE OBESITY WITHOUT SERIOUS COMORBIDITY WITH BODY MASS INDEX (BMI) OF 40.0 TO 44.9 IN ADULT, UNSPECIFIED OBESITY TYPE (HCC): ICD-10-CM

## 2022-10-20 DIAGNOSIS — R73.03 PREDIABETES: ICD-10-CM

## 2022-10-20 DIAGNOSIS — R94.39 ABNORMAL CARDIOVASCULAR STRESS TEST: Primary | ICD-10-CM

## 2022-10-20 DIAGNOSIS — E78.5 DYSLIPIDEMIA: ICD-10-CM

## 2022-10-20 DIAGNOSIS — I10 ESSENTIAL HYPERTENSION, BENIGN: ICD-10-CM

## 2022-10-20 LAB — EKG IMPRESSION: NORMAL

## 2022-10-20 PROCEDURE — 99204 OFFICE O/P NEW MOD 45 MIN: CPT | Performed by: INTERNAL MEDICINE

## 2022-10-20 PROCEDURE — 93000 ELECTROCARDIOGRAM COMPLETE: CPT | Performed by: INTERNAL MEDICINE

## 2022-10-20 RX ORDER — LISINOPRIL 40 MG/1
40 TABLET ORAL DAILY
Qty: 90 TABLET | Refills: 3 | Status: SHIPPED | OUTPATIENT
Start: 2022-10-20 | End: 2023-10-09

## 2022-10-20 RX ORDER — NITROGLYCERIN 0.4 MG/1
0.4 TABLET SUBLINGUAL PRN
Qty: 25 TABLET | Refills: 1 | Status: SHIPPED | OUTPATIENT
Start: 2022-10-20 | End: 2023-10-17

## 2022-10-20 RX ORDER — METOPROLOL SUCCINATE 25 MG/1
12.5 TABLET, EXTENDED RELEASE ORAL EVERY EVENING
Qty: 45 TABLET | Refills: 3 | Status: ON HOLD | OUTPATIENT
Start: 2022-10-20 | End: 2022-11-02 | Stop reason: SDUPTHER

## 2022-10-20 RX ORDER — ATORVASTATIN CALCIUM 40 MG/1
40 TABLET, FILM COATED ORAL EVERY EVENING
Qty: 90 TABLET | Refills: 1 | Status: SHIPPED | OUTPATIENT
Start: 2022-10-20 | End: 2023-04-17

## 2022-10-20 ASSESSMENT — FIBROSIS 4 INDEX: FIB4 SCORE: 1.04

## 2022-10-20 NOTE — PATIENT INSTRUCTIONS
Atorvastatin 40mg from 20mg  Lisinopril 40mg from 30mg  Metoprolol 12.5mg at night  Sublingual nitroglycerine 0.4mg, take 1 under the tongue every 5 minutes for your symptoms, no more than 3, and call 911 if you need to use a 3rd  Heart cath will be scheduled

## 2022-10-20 NOTE — PROGRESS NOTES
CARDIOLOGY NEW PATIENT:    PCP: Jonathan Reyes M.D.    1. Abnormal cardiovascular stress test    2. Other chest pain    3. Class 3 severe obesity without serious comorbidity with body mass index (BMI) of 40.0 to 44.9 in adult, unspecified obesity type (HCC)    4. Dyslipidemia    5. Essential hypertension, benign    6. Prediabetes    7. Essential hypertension    8. Mixed hyperlipidemia    9. Morbid obesity with BMI of 40.0-44.9, adult (HCC)    10. Incomplete RBBB        Car Coyle is referred to discuss recent abnormal cardiac stress test.    Chief Complaint   Patient presents with    Hypertension    Hyperlipidemia    Other     NP: Discuss PET scan and [facility]       History: Car Coyle is a 67 y.o. male with history of dyslipidemia, prediabetes (A1C 6.1% 8/2022), hypertension, hypotestosteronism and morbid obesity is here to discuss recent abnormal cardiac stress test in the setting of chest pain.  Has diffuse arthritis pains.    2 months ago started noticing left sided chest pressure, non-radiating, off and on all day, not necessarily with exertion (not while playing pickle-ball), no associated symptoms. Been progressively worsening in frequency over last month.    Checks BP at home, 2-4 times a week, usually 130-140/60's mmHg.    No SOB, FADUMO, orthopnea, PND. No known ESTELA but wife reports he snores and occasionally    Takes ASA 81mg daily for several years.    Takes atorva 20mg for 2-3 years (was on simvastatin before).    Low carb diet for 2 weeks, lost 8 lbs.    Lipid panel 8/2022:  , , HDL 42 and     K was 5.5 and Ca 10.3 (both elevated) on 8/2022 labs. Normal Cr then    Cardiac PET stress test 9/17/22: Personally reviewed   NUCLEAR IMAGING INTERPRETATION   There is a large area of mild to moderate reversibility involving most of the inferior and inferoseptal walls with extension into the anteroseptal wall andapex suggestive of ischemia. SDS = 8.   Normal left  ventricular systolic function.   Normal TID ratio.   ECG INTERPRETATION   Non-diagnostic due to use of pharmacological stress agent.    ECG 8/8/22: Personally reviewed  Normal sinus rhythm with borderline IVCD    Social:  Realtor , retired  Never smoked  Alcohol 1-2 drinks, 3 days a week  Coffee drinking  No marijuana  No illicit drugs    Cardiovascular Risk Factors:  1. Smoking status: None  2. Type II Diabetes Mellitus: None  3. Hypertension: Yes  4. Dyslipidemia: Yes  5. Obesity / metabolic syndrome: Yes  6. Family history of ASCVD: None   7. Family history of premature ASCVD in a first degree relative (Male less than 55 years of age; Female less than 60 years of age): None  8. Sedentary lifestyle: No  9. Lack of exercise: no routine      PE:  BP (!) 154/78 (BP Location: Left arm, Patient Position: Sitting, BP Cuff Size: Adult)   Pulse 62   Resp 20   Ht 1.829 m (6')   Wt (!) 145 kg (320 lb)   SpO2 96%   BMI 43.40 kg/m²     Gen: no acute distress  HEENT: Symmetric face. Anicteric sclerae. Moist mucus membranes  NECK: No JVD. 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 Abnormal overnight oximetry. 136 desats.      Vitamin d deficiency 6/27/2012     Past Surgical History:   Procedure Laterality Date    KNEE ARTHROSCOPY  2005    right knee    EYE SURGERY      cataract surgery     INCISION HERNIA REPAIR   1970s    TONSILLECTOMY       No Known Allergies  Outpatient Encounter Medications as of 10/20/2022   Medication Sig Dispense Refill    lisinopril (PRINIVIL) 40 MG tablet Take 1 Tablet by mouth every day. 90 Tablet 3    metoprolol SR (TOPROL XL) 25 MG TABLET SR 24 HR Take 0.5 Tablets by mouth every evening. 45 Tablet 3    nitroglycerin (NITROSTAT) 0.4 MG SL Tab Place 1 Tablet under the tongue as needed for Chest Pain. 25 Tablet 1    atorvastatin (LIPITOR) 40 MG Tab Take 1 Tablet by mouth every evening. 90 Tablet 1    meloxicam (MOBIC) 15 MG tablet Take 1 Tablet by mouth every day. (Patient taking differently: Take 15 mg by mouth every day. Taking half the dosage and skips days) 30 Tablet 6    fluticasone (FLONASE) 50 MCG/ACT nasal spray Administer 1 Spray into affected nostril(S) every day.      amLODIPine (NORVASC) 10 MG Tab TAKE 1 TABLET DAILY 90 Tablet 3    albuterol 108 (90 Base) MCG/ACT Aero Soln inhalation aerosol Inhale 1-2 Puffs every four hours as needed for Shortness of Breath. 18 g 3    MULTIPLE VITAMIN PO Take 1 Tab by mouth every day.      ibuprofen (MOTRIN) 200 MG TABS Take 400 mg by mouth every 6 hours as needed.      aspirin EC (ECOTRIN) 81 MG TBEC Take 81 mg by mouth every day.      [DISCONTINUED] lisinopril (PRINIVIL) 30 MG tablet TAKE 1 TABLET DAILY 90 Tablet 3    [DISCONTINUED] atorvastatin (LIPITOR) 20 MG Tab TAKE 1 TABLET DAILY 90 Tablet 3    [DISCONTINUED] Cholecalciferol (VITAMIN D3) 2000 UNIT CAPS Take 1,000 Units by mouth every day. (Patient not taking: Reported on 10/20/2022)       No facility-administered encounter medications on file as of 10/20/2022.     Social History     Socioeconomic History    Marital status:      Spouse name: Not on file    Number of children: 1    Years of education: Not on file    Highest education level: Not on file   Occupational History    Occupation: Realtor Residential     Employer: unknown   Tobacco Use    Smoking status: Never    Smokeless tobacco:  Never   Vaping Use    Vaping Use: Never used   Substance and Sexual Activity    Alcohol use: Yes     Alcohol/week: 0.0 oz     Comment: socially    Drug use: No    Sexual activity: Yes     Partners: Female   Other Topics Concern    Not on file   Social History Narrative    Employed as Realtor. Plays [employer] twice per week.      Social Determinants of Health     Financial Resource Strain: Not on file   Food Insecurity: Not on file   Transportation Needs: Not on file   Physical Activity: Not on file   Stress: Not on file   Social Connections: Not on file   Intimate Partner Violence: Not on file   Housing Stability: Not on file     Family History   Problem Relation Age of Onset    Heart Failure Mother     Genetic Disorder Father         alzheimer's    Hypertension Sister     Hypertension Sister     Diabetes Sister     Cancer Maternal Aunt         breast cancer         Studies    Lab Results   Component Value Date/Time    TSHULTRASEN 2.160 01/03/2017 0807        Lab Results   Component Value Date/Time    FREET4 1.37 06/12/2020 0807      Lab Results   Component Value Date/Time    HBA1C 6.1 (A) 08/15/2022 12:00 AM     Lab Results   Component Value Date/Time    CHOLSTRLTOT 168 07/21/2021 09:10 AM     (H) 07/21/2021 09:10 AM    HDL 39 (A) 07/21/2021 09:10 AM    TRIGLYCERIDE 142 07/21/2021 09:10 AM       Lab Results   Component Value Date/Time    SODIUM 135 07/21/2021 09:10 AM    POTASSIUM 4.4 07/21/2021 09:10 AM    CHLORIDE 105 07/21/2021 09:10 AM    CO2 19 (L) 07/21/2021 09:10 AM    GLUCOSE 103 (H) 07/21/2021 09:10 AM    BUN 19 07/21/2021 09:10 AM    CREATININE 1.01 07/21/2021 09:10 AM     Lab Results   Component Value Date/Time    ALKPHOSPHAT 78 07/21/2021 09:10 AM    ASTSGOT 17 07/21/2021 09:10 AM    ALTSGPT 21 07/21/2021 09:10 AM    TBILIRUBIN 0.5 07/21/2021 09:10 AM        Echocardiogram:  No results found for this or any previous visit.    ECG: 10/20/22 personally reviewed and interpreted  Sinus rhythm    Incomplete right bundle branch block   No previous ECG available for comparison   Electronically Signed On 10- 15:46:18 PDT by Jermaine Kwon MD     Assessment and Recommendations:    Problem List Items Addressed This Visit       Hyperlipidemia    Relevant Medications    lisinopril (PRINIVIL) 40 MG tablet    metoprolol SR (TOPROL XL) 25 MG TABLET SR 24 HR    nitroglycerin (NITROSTAT) 0.4 MG SL Tab    atorvastatin (LIPITOR) 40 MG Tab    Prediabetes    Relevant Medications    atorvastatin (LIPITOR) 40 MG Tab    Morbid obesity with BMI of 40.0-44.9, adult (HCC)    Relevant Medications    atorvastatin (LIPITOR) 40 MG Tab     Other Visit Diagnoses       Abnormal cardiovascular stress test    -  Primary    Relevant Medications    metoprolol SR (TOPROL XL) 25 MG TABLET SR 24 HR    nitroglycerin (NITROSTAT) 0.4 MG SL Tab    Other Relevant Orders    EKG (Completed)    CL-LEFT HEART CATHETERIZATION WITH POSSIBLE INTERVENTION    Other chest pain        Relevant Medications    metoprolol SR (TOPROL XL) 25 MG TABLET SR 24 HR    nitroglycerin (NITROSTAT) 0.4 MG SL Tab    Other Relevant Orders    EKG (Completed)    CL-LEFT HEART CATHETERIZATION WITH POSSIBLE INTERVENTION    Class 3 severe obesity without serious comorbidity with body mass index (BMI) of 40.0 to 44.9 in adult, unspecified obesity type (HCC)        Dyslipidemia        Essential hypertension, benign        Relevant Medications    lisinopril (PRINIVIL) 40 MG tablet    metoprolol SR (TOPROL XL) 25 MG TABLET SR 24 HR    nitroglycerin (NITROSTAT) 0.4 MG SL Tab    atorvastatin (LIPITOR) 40 MG Tab    Other Relevant Orders    EKG (Completed)    Essential hypertension        Relevant Medications    lisinopril (PRINIVIL) 40 MG tablet    metoprolol SR (TOPROL XL) 25 MG TABLET SR 24 HR    nitroglycerin (NITROSTAT) 0.4 MG SL Tab    atorvastatin (LIPITOR) 40 MG Tab    Incomplete RBBB        Relevant Medications    lisinopril (PRINIVIL) 40 MG tablet    metoprolol SR  (TOPROL XL) 25 MG TABLET SR 24 HR    nitroglycerin (NITROSTAT) 0.4 MG SL Tab    atorvastatin (LIPITOR) 40 MG Tab          Mr. Coyle's symptoms are not classic for cardiac angina however his recent stress test is concerning for at least moderate ischemia.  I discussed with him and his wife at length the importance of optimal medical therapy in the next several for coronary angiogram to better delineate his coronary anatomy which can potentially be therapeutic with PCI as indicated.    I recommend starting metoprolol succinate 12.5 mg at night, sublingual nitroglycerin as needed (given instructions on how on when to use it), increase lisinopril to 40 mg from 30 mg, increase atorvastatin to 40 mg from 20 mg and keep taking aspirin 81 mg for the time being.    I also advised him to keep track of his blood pressure a.m. and p.m. and send me his log in 2 weeks via [employer] to ensure controlled blood pressure at home on amlodipine 10 mg and lisinopril 40 mg.     We will plan to recheck lipid panel in 3 months after increasing his atorvastatin to ensure her LDL is not less than 100 (unless obstructive CAD is diagnosed, target will be less than 70), and triglyceride less than 150.    Discussed with him and his wife the importance of continuing with eating heart healthy diet and incorporate more aerobic exercise to help with his endeavor for further weight loss.    Discussed risks, benefits, rationale, appropriateness and alternatives to coronary angiography with IV sedation in great detail with the patient.  Complications including but not limited to death, stroke, MI, urgent bypass surgery, contrast nephropathy, vascular complications, bleeding and infection were explained.  In addition, we discussed that 10% of patients will experience small to moderate bruising at the side of the arterial puncture.  Risks of major complications such as heart attack or stroke caused by the angiogram is less than 1%; the risk of death is  approximately 1 in 1000.  The potential outcomes associated with the procedure (possible PCI, possible CABG, possible medical Rx only) were also discussed at length.  Patient voices understanding and with shared decision making, is in agreement to proceed.     Thank you for the opportunity to be involved in Car Coyle 's care; and please reach out with any questions or concerns.    Return in about 6 months (around 4/20/2023).    Jermaine Kwon MD, MPH [facility]  Interventional Cardiologist  [facility]   of Clinical Internal Medicine - [facility]    ~ Portions of this note were completed using voice recognition software (Dragon Naturally speaking software) . Occasional transcription errors may have escaped proof reading. I have made every reasonable attempt to correct obvious errors, but I expect that there are errors of grammar and possibly content that I did not discover before finalizing the note. ~

## 2022-10-20 NOTE — TELEPHONE ENCOUNTER
Matthieu Grace,  Patient was seen today by AL and he has ordered a CL-LEFT HEART CATHETERIZATION WITH POSSIBLE INTERVENTION [[id number]] to be scheduled for the patient.    Thank you.  Esme Egan Ass't

## 2022-10-21 ENCOUNTER — TELEPHONE (OUTPATIENT)
Dept: CARDIOLOGY | Facility: MEDICAL CENTER | Age: 67
End: 2022-10-21
Payer: MEDICARE

## 2022-10-21 NOTE — TELEPHONE ENCOUNTER
Patient is scheduled on 11-2-22 for a C w/poss with Dr.Al Burton. No meds to stop and patient to check in at 6:00 for a 7:30 procedure. H&P was done on 10-20-22 by Dr. Kwon. Pre admit to call patient due to patient living out of area.

## 2022-10-21 NOTE — TELEPHONE ENCOUNTER
----- Message from Nash Kwon M.D. sent at 10/21/2022 10:57 AM PDT -----  Regarding: RE: Next available Nov.2nd  Yes thank you!  ----- Message -----  From: Sanjay Saldana  Sent: 10/21/2022  10:56 AM PDT  To: Nash Kwon M.D.  Subject: Next available Nov.2nd                           Your next available is 11-2-22, is the patient ok to wait that long?  ----- Message -----  From: Nash Kwon M.D.  Sent: 10/20/2022   4:25 PM PDT  To: Sanjay Saldana  Subject: heart cath                                       Matthieu Grace,    Can you plz schedule C for this patient soonest available with me :)    Thanks !  nash

## 2022-10-26 ENCOUNTER — PRE-ADMISSION TESTING (OUTPATIENT)
Dept: ADMISSIONS | Facility: MEDICAL CENTER | Age: 67
End: 2022-10-26
Attending: INTERNAL MEDICINE
Payer: MEDICARE

## 2022-11-02 ENCOUNTER — APPOINTMENT (OUTPATIENT)
Dept: CARDIOLOGY | Facility: MEDICAL CENTER | Age: 67
End: 2022-11-02
Attending: INTERNAL MEDICINE
Payer: MEDICARE

## 2022-11-02 ENCOUNTER — HOSPITAL ENCOUNTER (OUTPATIENT)
Facility: MEDICAL CENTER | Age: 67
End: 2022-11-02
Attending: INTERNAL MEDICINE | Admitting: INTERNAL MEDICINE
Payer: MEDICARE

## 2022-11-02 VITALS
DIASTOLIC BLOOD PRESSURE: 66 MMHG | HEART RATE: 54 BPM | SYSTOLIC BLOOD PRESSURE: 135 MMHG | OXYGEN SATURATION: 96 % | BODY MASS INDEX: 42.66 KG/M2 | WEIGHT: 315 LBS | RESPIRATION RATE: 16 BRPM | TEMPERATURE: 96.9 F | HEIGHT: 72 IN

## 2022-11-02 DIAGNOSIS — R94.39 ABNORMAL CARDIOVASCULAR STRESS TEST: ICD-10-CM

## 2022-11-02 DIAGNOSIS — R07.89 OTHER CHEST PAIN: ICD-10-CM

## 2022-11-02 LAB
ACT BLD: 219 SEC (ref 74–137)
ALBUMIN SERPL BCP-MCNC: 4.4 G/DL (ref 3.2–4.9)
ALBUMIN/GLOB SERPL: 1.2 G/DL
ALP SERPL-CCNC: 79 U/L (ref 30–99)
ALT SERPL-CCNC: 29 U/L (ref 2–50)
ANION GAP SERPL CALC-SCNC: 13 MMOL/L (ref 7–16)
APTT PPP: 26.3 SEC (ref 24.7–36)
AST SERPL-CCNC: 23 U/L (ref 12–45)
BILIRUB SERPL-MCNC: 0.6 MG/DL (ref 0.1–1.5)
BUN SERPL-MCNC: 19 MG/DL (ref 8–22)
CALCIUM SERPL-MCNC: 9.8 MG/DL (ref 8.5–10.5)
CHLORIDE SERPL-SCNC: 104 MMOL/L (ref 96–112)
CO2 SERPL-SCNC: 22 MMOL/L (ref 20–33)
CREAT SERPL-MCNC: 0.93 MG/DL (ref 0.5–1.4)
EKG IMPRESSION: NORMAL
ERYTHROCYTE [DISTWIDTH] IN BLOOD BY AUTOMATED COUNT: 41.3 FL (ref 35.9–50)
GFR SERPLBLD CREATININE-BSD FMLA CKD-EPI: 90 ML/MIN/1.73 M 2
GLOBULIN SER CALC-MCNC: 3.6 G/DL (ref 1.9–3.5)
GLUCOSE SERPL-MCNC: 110 MG/DL (ref 65–99)
HCT VFR BLD AUTO: 46.7 % (ref 42–52)
HGB BLD-MCNC: 15.7 G/DL (ref 14–18)
INR PPP: 1.01 (ref 0.87–1.13)
MCH RBC QN AUTO: 30.1 PG (ref 27–33)
MCHC RBC AUTO-ENTMCNC: 33.6 G/DL (ref 33.7–35.3)
MCV RBC AUTO: 89.5 FL (ref 81.4–97.8)
PLATELET # BLD AUTO: 233 K/UL (ref 164–446)
PMV BLD AUTO: 10 FL (ref 9–12.9)
POTASSIUM SERPL-SCNC: 4.8 MMOL/L (ref 3.6–5.5)
PROT SERPL-MCNC: 8 G/DL (ref 6–8.2)
PROTHROMBIN TIME: 13.2 SEC (ref 12–14.6)
RBC # BLD AUTO: 5.22 M/UL (ref 4.7–6.1)
SODIUM SERPL-SCNC: 139 MMOL/L (ref 135–145)
WBC # BLD AUTO: 8.1 K/UL (ref 4.8–10.8)

## 2022-11-02 PROCEDURE — 36415 COLL VENOUS BLD VENIPUNCTURE: CPT

## 2022-11-02 PROCEDURE — 160036 HCHG PACU - EA ADDL 30 MINS PHASE I

## 2022-11-02 PROCEDURE — 80053 COMPREHEN METABOLIC PANEL: CPT

## 2022-11-02 PROCEDURE — 160035 HCHG PACU - 1ST 60 MINS PHASE I

## 2022-11-02 PROCEDURE — 93010 ELECTROCARDIOGRAM REPORT: CPT | Mod: 59 | Performed by: INTERNAL MEDICINE

## 2022-11-02 PROCEDURE — 700101 HCHG RX REV CODE 250

## 2022-11-02 PROCEDURE — 85027 COMPLETE CBC AUTOMATED: CPT

## 2022-11-02 PROCEDURE — 160002 HCHG RECOVERY MINUTES (STAT)

## 2022-11-02 PROCEDURE — 700105 HCHG RX REV CODE 258: Performed by: INTERNAL MEDICINE

## 2022-11-02 PROCEDURE — 700102 HCHG RX REV CODE 250 W/ 637 OVERRIDE(OP): Performed by: NURSE PRACTITIONER

## 2022-11-02 PROCEDURE — 93005 ELECTROCARDIOGRAM TRACING: CPT | Performed by: INTERNAL MEDICINE

## 2022-11-02 PROCEDURE — A9270 NON-COVERED ITEM OR SERVICE: HCPCS | Performed by: NURSE PRACTITIONER

## 2022-11-02 PROCEDURE — 85730 THROMBOPLASTIN TIME PARTIAL: CPT

## 2022-11-02 PROCEDURE — 700111 HCHG RX REV CODE 636 W/ 250 OVERRIDE (IP)

## 2022-11-02 PROCEDURE — 99153 MOD SED SAME PHYS/QHP EA: CPT

## 2022-11-02 PROCEDURE — 160046 HCHG PACU - 1ST 60 MINS PHASE II

## 2022-11-02 PROCEDURE — 93571 IV DOP VEL&/PRESS C FLO 1ST: CPT | Mod: 26,52,LD | Performed by: INTERNAL MEDICINE

## 2022-11-02 PROCEDURE — 700117 HCHG RX CONTRAST REV CODE 255: Performed by: INTERNAL MEDICINE

## 2022-11-02 PROCEDURE — 85347 COAGULATION TIME ACTIVATED: CPT

## 2022-11-02 PROCEDURE — 93458 L HRT ARTERY/VENTRICLE ANGIO: CPT | Mod: 26 | Performed by: INTERNAL MEDICINE

## 2022-11-02 PROCEDURE — 99152 MOD SED SAME PHYS/QHP 5/>YRS: CPT | Performed by: INTERNAL MEDICINE

## 2022-11-02 PROCEDURE — 85610 PROTHROMBIN TIME: CPT

## 2022-11-02 RX ORDER — MIDAZOLAM HYDROCHLORIDE 1 MG/ML
INJECTION INTRAMUSCULAR; INTRAVENOUS
Status: COMPLETED
Start: 2022-11-02 | End: 2022-11-02

## 2022-11-02 RX ORDER — LIDOCAINE HYDROCHLORIDE 20 MG/ML
INJECTION, SOLUTION INFILTRATION; PERINEURAL
Status: COMPLETED
Start: 2022-11-02 | End: 2022-11-02

## 2022-11-02 RX ORDER — HEPARIN SODIUM 200 [USP'U]/100ML
INJECTION, SOLUTION INTRAVENOUS
Status: COMPLETED
Start: 2022-11-02 | End: 2022-11-02

## 2022-11-02 RX ORDER — METOPROLOL SUCCINATE 25 MG/1
25 TABLET, EXTENDED RELEASE ORAL EVERY EVENING
Qty: 90 TABLET | Refills: 3 | Status: SHIPPED | OUTPATIENT
Start: 2022-11-02 | End: 2023-05-24 | Stop reason: SDUPTHER

## 2022-11-02 RX ORDER — HEPARIN SODIUM 1000 [USP'U]/ML
INJECTION, SOLUTION INTRAVENOUS; SUBCUTANEOUS
Status: COMPLETED
Start: 2022-11-02 | End: 2022-11-02

## 2022-11-02 RX ORDER — SODIUM CHLORIDE 9 MG/ML
1000 INJECTION, SOLUTION INTRAVENOUS
Status: COMPLETED | OUTPATIENT
Start: 2022-11-02 | End: 2022-11-02

## 2022-11-02 RX ORDER — ACETAMINOPHEN 500 MG
1000 TABLET ORAL EVERY 6 HOURS PRN
COMMUNITY

## 2022-11-02 RX ORDER — VERAPAMIL HYDROCHLORIDE 2.5 MG/ML
INJECTION, SOLUTION INTRAVENOUS
Status: COMPLETED
Start: 2022-11-02 | End: 2022-11-02

## 2022-11-02 RX ORDER — ACETAMINOPHEN 325 MG/1
650 TABLET ORAL EVERY 4 HOURS PRN
Status: DISCONTINUED | OUTPATIENT
Start: 2022-11-02 | End: 2022-11-02 | Stop reason: HOSPADM

## 2022-11-02 RX ADMIN — FENTANYL CITRATE 100 MCG: 50 INJECTION, SOLUTION INTRAMUSCULAR; INTRAVENOUS at 08:09

## 2022-11-02 RX ADMIN — MIDAZOLAM 1 MG: 1 INJECTION, SOLUTION INTRAMUSCULAR; INTRAVENOUS at 08:19

## 2022-11-02 RX ADMIN — IOHEXOL 67 ML: 350 INJECTION, SOLUTION INTRAVENOUS at 08:19

## 2022-11-02 RX ADMIN — MIDAZOLAM 2 MG: 1 INJECTION, SOLUTION INTRAMUSCULAR; INTRAVENOUS at 08:12

## 2022-11-02 RX ADMIN — SODIUM CHLORIDE 1000 ML: 9 INJECTION, SOLUTION INTRAVENOUS at 06:51

## 2022-11-02 RX ADMIN — LIDOCAINE HYDROCHLORIDE: 20 INJECTION, SOLUTION INFILTRATION; PERINEURAL at 07:40

## 2022-11-02 RX ADMIN — VERAPAMIL HYDROCHLORIDE 2.5 MG: 2.5 INJECTION, SOLUTION INTRAVENOUS at 07:40

## 2022-11-02 RX ADMIN — ACETAMINOPHEN 650 MG: 325 TABLET, FILM COATED ORAL at 09:37

## 2022-11-02 RX ADMIN — HEPARIN SODIUM 2000 UNITS: 200 INJECTION, SOLUTION INTRAVENOUS at 07:40

## 2022-11-02 RX ADMIN — HEPARIN SODIUM: 1000 INJECTION, SOLUTION INTRAVENOUS; SUBCUTANEOUS at 07:40

## 2022-11-02 RX ADMIN — NITROGLYCERIN 10 ML: 20 INJECTION INTRAVENOUS at 07:40

## 2022-11-02 ASSESSMENT — FIBROSIS 4 INDEX
FIB4 SCORE: 1.04
FIB4 SCORE: 1.04

## 2022-11-02 NOTE — DISCHARGE INSTRUCTIONS
HOME CARE INSTRUCTIONS    ACTIVITY: Rest and take it easy for the first 24 hours.  A responsible adult is recommended to remain with you during that time.  It is normal to feel sleepy.  We encourage you to not do anything that requires balance, judgment or coordination.    FOR 24 HOURS DO NOT:  Drive, operate machinery or run household appliances.  Drink beer or alcoholic beverages.  Make important decisions or sign legal documents.    SPECIAL INSTRUCTIONS:   Stop aspirin  Increase metoprolol to 25mg at night  Send me your blood pressure log, am and pm, in 1  week after increasing your lisinopril to 40mg (check it 2 hours in am and 2 hours in pm after your pills)  Schedule follow up with me in 6 months     POST ANGIOGRAM  General Care Instructions  Maintain a bandage over the incision site for 24 hours.  It's normal to find a small bruise or dime-sized lump at the insertion site. This should disappear within a few weeks.  Do not apply lotions or powders to the site.  Do not immerse the catheter insertion site in water (bathtub/swimming) for five days. It is ok to shower 24 hours after the procedure.  You may resume your normal diet immediately; on the day of your procedure, drink 6-10 glasses of water to help flush the contrast liquid out of your system.  If the doctor inserted the catheter in your arm:  For 24 hours, DO NOT lift anything heavier than 10 pounds (approximately a gallon of milk). DO NOT do any heavy pushing, pulling, or twisting.    Medications  If your current medications need to be changed, you will be provided with an updated list of your medications prior to discharge.  If you take warfarin (Coumadin), resume taking your usual dose the evening after the procedure.  DO NOT STOP taking prescribed blood thinning (anti-platelet) medications unless instructed by your cardiologist.  These medications include:  Aspirin, Clopidogrel (Plavix), Ticagrelor (Brilinta), or Prasugrel (Effient)   If you take one  of the following anticoagulants, RESUME 24 HOURS after your procedure:  Apixiban (Eliquis), Rivaroxaban (Xarelto), Dabigatran (Pradaxa), Edoxaban (Savaysa)  If you take metformin (Glucophage), RESUME 48 HOURS after your procedure.    When to call your healthcare provider  Call your cardiologist right away at [phone] if you have any of the following:   Problems/Concerns taking any of your prescribed heart medicines.   The insertion site has increasing pain, swelling, redness, bleeding, or drainage.   Your arm or leg below where the insertion site changes color, is cool, or is numb.   You have chest pain or shortness of breath that does not go away with rest.   Your pulse feels irregular -- very slow (less than 60 beats/minute) or very fast (over 100 beats/minute).   You have dizziness, fainting, or you are very tired.   You are coughing up blood or yellow or green mucus.   You have chills or a fever over 101°F (38.3°C).    If there is bleeding at the catheter insertion site, apply pressure for 10 minutes.  If bleeding persists, call 911, and continue to hold pressure until advanced medical support arrives.      DIET: To avoid nausea, slowly advance diet as tolerated, avoiding spicy or greasy foods for the first day.  Add more substantial food to your diet according to your physician's instructions.  Babies can be fed formula or breast milk as soon as they are hungry.  INCREASE FLUIDS AND FIBER TO AVOID CONSTIPATION.    SURGICAL DRESSING/BATHING: may remove dressing in 24 hours and then shower; limit wrist movement for 48 hours    MEDICATIONS: Resume taking daily medication.  Take prescribed pain medication with food.  If no medication is prescribed, you may take non-aspirin pain medication if needed.  PAIN MEDICATION CAN BE VERY CONSTIPATING.  Take a stool softener or laxative such as senokot, pericolace, or milk of magnesia if needed.    Prescription given for _Metoprolol (dose change)_.      A follow-up  appointment should be arranged with your doctor; call to schedule.    You should CALL YOUR PHYSICIAN if you develop:  Fever greater than 101 degrees F.  Pain not relieved by medication, or persistent nausea or vomiting.  Excessive bleeding (blood soaking through dressing) or unexpected drainage from the wound.  Extreme redness or swelling around the incision site, drainage of pus or foul smelling drainage.  Inability to urinate or empty your bladder within 8 hours.  Problems with breathing or chest pain.    You should call 911 if you develop problems with breathing or chest pain.  If you are unable to contact your doctor or surgical center, you should go to the nearest emergency room or urgent care center.  Physician's telephone #: [phone]    MILD FLU-LIKE SYMPTOMS ARE NORMAL.  YOU MAY EXPERIENCE GENERALIZED MUSCLE ACHES, THROAT IRRITATION, HEADACHE AND/OR SOME NAUSEA.    If any questions arise, call your doctor.  If your doctor is not available, please feel free to call the Surgical Center at [phone].  The Center is open Monday through Friday from 7AM to 7PM.      A registered nurse may call you a few days after your surgery to see how you are doing after your procedure.    You may also receive a survey in the mail within the next two weeks and we ask that you take a few moments to complete the survey and return it to us.  Our goal is to provide you with very good care and we value your comments.     Depression / Suicide Risk    As you are discharged from this [facility] facility, it is important to learn how to keep safe from harming yourself.    Recognize the warning signs:  Abrupt changes in personality, positive or negative- including increase in energy   Giving away possessions  Change in eating patterns- significant weight changes-  positive or negative  Change in sleeping patterns- unable to sleep or sleeping all the time   Unwillingness or inability to communicate  Depression  Unusual  sadness, discouragement and loneliness  Talk of wanting to die  Neglect of personal appearance   Rebelliousness- reckless behavior  Withdrawal from people/activities they love  Confusion- inability to concentrate     If you or a loved one observes any of these behaviors or has concerns about self-harm, here's what you can do:  Talk about it- your feelings and reasons for harming yourself  Remove any means that you might use to hurt yourself (examples: pills, rope, extension cords, firearm)  Get professional help from the community (Mental Health, Substance Abuse, psychological counseling)  Do not be alone:Call your Safe Contact- someone whom you trust who will be there for you.  Call your local CRISIS HOTLINE 032-7470 or 895-040-3293  Call your local Children's Mobile Crisis Response Team [place] (628) 065-7897 or www.[employer]  Call the toll free National Suicide Prevention Hotlines   National Suicide Prevention Lifeline 819-952-CHJL (4766)  National Hope Line Network 800-SUICIDE (397-3826)    I acknowledge receipt and understanding of these Home Care instructions.

## 2022-11-02 NOTE — OP NOTE
Cardiac Catheterization Laboratory Procedure Note    DATE: 11/2/2022    : Jermaine Kwon MD, MPH    PROCEDURES PERFORMED:  Left heart catheterization  Coronary angiography  Instantaneous wave-free ratio assessment of the left anterior descending coronary artery  Moderate conscious sedation    INDICATIONS:  Positive cardiac stress test, stable angina    CONSENT:  The complete alternatives, risks, and benefits of the procedure were explained to the patient.  Signed informed consent was obtained and placed in the chart prior to the procedure.  A timeout was performed prior to beginning procedure.    MEDICATIONS:  Lidocaine  Fentanyl  Midazolam  Nitroglycerin  Verapamil  Heparin    MODERATE CONSCIOUS SEDATION:  I personally supervised the administration of moderate conscious sedation by the nursing staff for 26 minutes.  Sedation start time 7:55 AM  Sedation end time 8:21 AM    CONTRAST: Omnipaque 67 cc    RADIATION DOSE (Air Kerma): 519 mGy    FLUOROSCOPY TIME: 8.3 minutes    ACCESS: 6-Barbadian Glidesheath in the right radial artery.    ESTIMATED BLOOD LOSS: <20 cc    COMPLICATIONS: None apparent    PROCEDURE IN DETAIL:  The patient was brought to the cardiac catheterization laboratory in the fasting state.  The skin over the right wrist was prepped and draped in the usual sterile fashion. Lidocaine infiltration was used to anesthetize the tissue over the right radial artery.  Using the micropuncture technique, a 6-Barbadian Glidesheath was inserted in the right radial artery.  A 5-Barbadian Terumo Tiger diagnostic catheter was then advanced over a standard J-wire into the left ventricular cavity where it was gently aspirated, flushed, and then withdrawn across the aortic valve with sequential pressures measured.  This catheter was then used to engage the ostium of the left coronary artery and cineangiograms were obtained in multiple projections for complete evaluation of the left coronary system. This catheter used to  "engage the ostium of the right coronary artery and and cineangiograms were obtained in multiple projections for complete evaluation of the right coronary system.  Following completion of coronary angiography, we proceeded with IFR of the LAD.    The Hi-G-Teker diagnostic catheter was exchanged over a J-wire for a 6 Taiwanese EBU 3.75 guide catheter, which was used to reengage the ostium left coronary artery.  A 0.014\" Omni wire was then advanced into the left anterior descending coronary artery where pressures were normalized.   The wire was then advanced across the lesion in the ostial/proximal and mid left anterior descending coronary artery and IFR was performed with a minimum value of 0.79.  The wire was then withdrawn into the left main coronary artery with equalization of pressures to 1.  iFR pullback was performed to determine the level of significant stenosis which was at the mid segment, and after administration of intracoronary nitroglycerin the mid LAD myocardial bridge was more prominent.      One final cineangiogram was then obtained, which demonstrated no evidence of wire perforation thrombus or dissection.  At the completion of the case, all wires, catheters, and sheaths were removed.  A TR band was placed using the patent hemostasis technique.    HEMODYNAMICS:   Aortic pressure: 140 /65/92 mmHg  LVEDP: 20 mmHg  No significant aortic gradient on pullback    CORONARY ANGIOGRAPHY:  The left main coronary artery : Mild 20% mid stenosis, trifurcates to LAD, left circumflex and ramus intermedius  Ramus intermedius coronary arteries: Large in caliber vessel, bifurcates, luminal irregularities.  Large in caliber vessel  The left anterior descending coronary artery : With 40 to 50% ostial/proximal stenosis (IFR negative on pullback), with about 20 mm in length mid LAD myocardial bridging more prominent after administration of intracoronary nitroglycerin.  Transapical vessel.  Gives off small in caliber D1 and " D3 and medium in caliber D2.  Diffuse luminal irregularities otherwise.  The left circumflex coronary artery : Medium caliber vessel with diffuse nonobstructive CAD.  Gives off diminutive OM1 and OM 2, and a medium caliber bifurcating OM 3.  The right coronary artery  : Dominant vessel, luminal irregularities.    INSTANTANEOUS WAVE FREE RATIO:  IFR of the left anterior descending coronary artery was 0.79 and on pullback the mid LAD segment was the culprit revealing mid LAD myocardial bridge which was more evident after administration of intracoronary nitroglycerin.    IMPRESSION:  Mid LAD myocardial bridge  Nonobstructive CAD otherwise  Hypertension  Elevated resting LVEDP with no significant transaortic gradient on pullback    RECOMMENDATIONS:  TR band protocol  Increase metoprolol to succinate to 25 mg at nighttime  Stop aspirin  Advised patient to start taking his 40 mg of lisinopril instead of 30 mg and send me in a.m. and p.m. blood pressure log in 1 week to make further antihypertensive medication adjustments which will help optimize his LVEDP.  Continued efforts with eating heart healthy diet, weight loss and incorporate more aerobic exercise.    NOTIFICATION:  The patient's wife was notified of the results.      Jermaine Kwon MD, MPH [facility]  Interventional Cardiologist  [facility]   of Clinical Internal Medicine - [facility] Keegan FONTAINE

## 2022-11-02 NOTE — OR NURSING
Pt verbalizes readiness for discharge. Instructions reviewed with pt and pt's wife. No further needs. Pt taken to car via wheelchair by CNA.

## 2022-11-02 NOTE — OR NURSING
0830 Pt over from cath lab post cardiac angiogram. Right wrist site with TR band in place, site CDI and no signs of bleeding. Pt awake and alert, denies pain and nausea. VSS  0845 Tolerating orals  0910 Called Nika and updated her on POC  1010 TR band removed per MD order. Right radial site CDI and soft, no signs of bleeding  1015 Report to Gertrudis RN  1019 Pt over to Phase II

## 2022-11-14 DIAGNOSIS — I10 ESSENTIAL HYPERTENSION, BENIGN: ICD-10-CM

## 2022-11-14 RX ORDER — HYDROCHLOROTHIAZIDE 12.5 MG/1
12.5 CAPSULE, GELATIN COATED ORAL DAILY
Qty: 30 CAPSULE | Refills: 0 | Status: SHIPPED | OUTPATIENT
Start: 2022-11-14 | End: 2022-11-27

## 2022-11-22 ENCOUNTER — OFFICE VISIT (OUTPATIENT)
Dept: MEDICAL GROUP | Facility: PHYSICIAN GROUP | Age: 67
End: 2022-11-22
Payer: MEDICARE

## 2022-11-22 VITALS
WEIGHT: 315 LBS | SYSTOLIC BLOOD PRESSURE: 118 MMHG | DIASTOLIC BLOOD PRESSURE: 66 MMHG | TEMPERATURE: 98.5 F | RESPIRATION RATE: 12 BRPM | OXYGEN SATURATION: 94 % | BODY MASS INDEX: 42.66 KG/M2 | HEIGHT: 72 IN | HEART RATE: 59 BPM

## 2022-11-22 DIAGNOSIS — E78.2 MIXED HYPERLIPIDEMIA: ICD-10-CM

## 2022-11-22 DIAGNOSIS — R73.03 PREDIABETES: ICD-10-CM

## 2022-11-22 DIAGNOSIS — E66.01 MORBID OBESITY WITH BMI OF 40.0-44.9, ADULT (HCC): ICD-10-CM

## 2022-11-22 LAB
HBA1C MFR BLD: 6 % (ref 0–5.6)
INT CON NEG: NEGATIVE
INT CON POS: POSITIVE

## 2022-11-22 PROCEDURE — 83036 HEMOGLOBIN GLYCOSYLATED A1C: CPT | Performed by: FAMILY MEDICINE

## 2022-11-22 PROCEDURE — 99214 OFFICE O/P EST MOD 30 MIN: CPT | Performed by: FAMILY MEDICINE

## 2022-11-22 ASSESSMENT — ENCOUNTER SYMPTOMS
BLURRED VISION: 0
MUSCULOSKELETAL NEGATIVE: 1
EYES NEGATIVE: 1
MYALGIAS: 0
TINGLING: 0
BRUISES/BLEEDS EASILY: 0
NAUSEA: 0
CARDIOVASCULAR NEGATIVE: 1
GASTROINTESTINAL NEGATIVE: 1
DOUBLE VISION: 0
PSYCHIATRIC NEGATIVE: 1
DIZZINESS: 0
HEARTBURN: 0
HEADACHES: 0
COUGH: 0
NEUROLOGICAL NEGATIVE: 1
FEVER: 0
RESPIRATORY NEGATIVE: 1
CHILLS: 0
PALPITATIONS: 0
HEMOPTYSIS: 0
CONSTITUTIONAL NEGATIVE: 1
DEPRESSION: 0

## 2022-11-22 ASSESSMENT — FIBROSIS 4 INDEX: FIB4 SCORE: 1.21

## 2022-11-22 NOTE — PROGRESS NOTES
Subjective     Car Coyle is a 67 y.o. male who presents with Follow-Up            1. Prediabetes  A1c 6.0  Due to the patient's issues with glucose management, today they were extensively counseled on a low carb diet and exercise and losing weight     POCT Hemoglobin A1C    2. Morbid obesity with BMI of 40.0-44.9, adult (HCC)  Patient has a diagnosis of obesity. They were counseled today on increasing exercise and  extensively counseled on a low carb diet         3. Mixed hyperlipidemia   Currently treated for HLD, taking meds with no new myalgias or joint pain, watching fats in diet  controlled        Past Medical History:  06/13/2012: HDL deficiency  No date: High cholesterol  06/13/2012: HTN (hypertension)  06/13/2012: Hyperlipidemia  06/13/2012: Hypertriglyceridemia  06/27/2012: Hypotestosteronism  No date: Seasonal allergies      Comment:  Inhaler us as needed.  10/17/2014: Sleep disorder breathing      Comment:  Oct 2014. Abnormal overnight oximetry. 136 desats.    No date: Snoring  06/27/2012: Vitamin d deficiency  Past Surgical History:  2005: KNEE ARTHROSCOPY      Comment:  right knee  No date: EYE SURGERY      Comment:  cataract surgery   1970s: INCISION HERNIA REPAIR  No date: TONSILLECTOMY  Social History    Tobacco Use      Smoking status: Never      Smokeless tobacco: Never    Vaping Use      Vaping Use: Never used    Alcohol use: Yes      Comment: 3-4 times a week, 1-2 drinks.    Drug use: No    Review of patient's family history indicates:      Current Outpatient Medications: ·  hydrochlorothiazide (MICROZIDE) 12.5 MG capsule, Take 1 Capsule by mouth every day., Disp: 30 Capsule, Rfl: 0·  acetaminophen (TYLENOL) 500 MG Tab, Take 1,000 mg by mouth every 6 hours as needed., Disp: , Rfl: ·  Lactobacillus Rhamnosus, GG, (CULTUREE IMMUNITY SUPPORT PO), Take 1 Tablet by mouth every day., Disp: , Rfl: ·  metoprolol SR (TOPROL XL) 25 MG TABLET SR 24 HR, Take 1 Tablet by mouth every evening.,  Disp: 90 Tablet, Rfl: 3·  lisinopril (PRINIVIL) 40 MG tablet, Take 1 Tablet by mouth every day., Disp: 90 Tablet, Rfl: 3·  nitroglycerin (NITROSTAT) 0.4 MG SL Tab, Place 1 Tablet under the tongue as needed for Chest Pain., Disp: 25 Tablet, Rfl: 1·  atorvastatin (LIPITOR) 40 MG Tab, Take 1 Tablet by mouth every evening., Disp: 90 Tablet, Rfl: 1·  fluticasone (FLONASE) 50 MCG/ACT nasal spray, Administer 1 Spray into affected nostril(S) every day., Disp: , Rfl: ·  amLODIPine (NORVASC) 10 MG Tab, TAKE 1 TABLET DAILY, Disp: 90 Tablet, Rfl: 3·  albuterol 108 (90 Base) MCG/ACT Aero Soln inhalation aerosol, Inhale 1-2 Puffs every four hours as needed for Shortness of Breath., Disp: 18 g, Rfl: 3·  MULTIPLE VITAMIN PO, Take 1 Tab by mouth every day., Disp: , Rfl:     Patient was instructed on the use of medications, either prescriptions or OTC and informed on when the appropriate follow up time period should be. In addition, patient was also instructed that should any acute worsening occur that they should notify this clinic asap or call 911.            Review of Systems   Constitutional: Negative.  Negative for chills and fever.   HENT: Negative.  Negative for hearing loss.    Eyes: Negative.  Negative for blurred vision and double vision.   Respiratory: Negative.  Negative for cough and hemoptysis.    Cardiovascular: Negative.  Negative for chest pain and palpitations.   Gastrointestinal: Negative.  Negative for heartburn and nausea.   Genitourinary: Negative.  Negative for dysuria.   Musculoskeletal: Negative.  Negative for myalgias.   Skin: Negative.  Negative for rash.   Neurological: Negative.  Negative for dizziness, tingling and headaches.   Endo/Heme/Allergies: Negative.  Does not bruise/bleed easily.   Psychiatric/Behavioral: Negative.  Negative for depression and suicidal ideas.    All other systems reviewed and are negative.           Objective     /66 (BP Location: Left arm, Patient Position: Sitting, BP  Cuff Size: Large adult)   Pulse (!) 59   Temp 36.9 °C (98.5 °F) (Temporal)   Resp 12   Ht 1.829 m (6')   Wt (!) 146 kg (321 lb)   SpO2 94%   BMI 43.54 kg/m²      Physical Exam  Vitals and nursing note reviewed.   Constitutional:       General: He is not in acute distress.     Appearance: He is well-developed. He is not diaphoretic.   HENT:      Head: Normocephalic and atraumatic.      Mouth/Throat:      Pharynx: No oropharyngeal exudate.   Eyes:      Pupils: Pupils are equal, round, and reactive to light.   Cardiovascular:      Rate and Rhythm: Normal rate and regular rhythm.      Heart sounds: Normal heart sounds. No murmur heard.    No friction rub. No gallop.   Pulmonary:      Effort: Pulmonary effort is normal. No respiratory distress.      Breath sounds: Normal breath sounds. No wheezing or rales.   Chest:      Chest wall: No tenderness.   Neurological:      Mental Status: He is alert and oriented to person, place, and time.   Psychiatric:         Behavior: Behavior normal.         Thought Content: Thought content normal.         Judgment: Judgment normal.                           Assessment & Plan        1. Prediabetes    - POCT Hemoglobin A1C    2. Morbid obesity with BMI of 40.0-44.9, adult (HCC)      3. Mixed hyperlipidemia

## 2022-11-27 DIAGNOSIS — I10 ESSENTIAL HYPERTENSION, BENIGN: ICD-10-CM

## 2022-11-27 RX ORDER — HYDROCHLOROTHIAZIDE 25 MG/1
25 TABLET ORAL DAILY
Qty: 90 TABLET | Refills: 3 | Status: SHIPPED | OUTPATIENT
Start: 2022-11-27 | End: 2023-11-03

## 2023-01-16 DIAGNOSIS — R73.03 PREDIABETES: ICD-10-CM

## 2023-01-16 DIAGNOSIS — E66.01 MORBID OBESITY WITH BMI OF 40.0-44.9, ADULT (HCC): ICD-10-CM

## 2023-01-16 DIAGNOSIS — I10 ESSENTIAL HYPERTENSION: ICD-10-CM

## 2023-01-16 DIAGNOSIS — E78.2 MIXED HYPERLIPIDEMIA: ICD-10-CM

## 2023-01-17 RX ORDER — AMLODIPINE BESYLATE 10 MG/1
TABLET ORAL
Qty: 90 TABLET | Refills: 3 | Status: SHIPPED | OUTPATIENT
Start: 2023-01-17 | End: 2024-01-12 | Stop reason: SDUPTHER

## 2023-01-17 NOTE — TELEPHONE ENCOUNTER
Received request via: Pharmacy    Was the patient seen in the last year in this department? Yes    Does the patient have an active prescription (recently filled or refills available) for medication(s) requested? No    Does the patient have senior living Plus and need 100 day supply (blood pressure, diabetes and cholesterol meds only)? Patient does not have SCP

## 2023-02-15 ENCOUNTER — OFFICE VISIT (OUTPATIENT)
Dept: MEDICAL GROUP | Facility: PHYSICIAN GROUP | Age: 68
End: 2023-02-15
Payer: MEDICARE

## 2023-02-15 VITALS
DIASTOLIC BLOOD PRESSURE: 68 MMHG | SYSTOLIC BLOOD PRESSURE: 132 MMHG | RESPIRATION RATE: 16 BRPM | BODY MASS INDEX: 42.66 KG/M2 | HEIGHT: 72 IN | OXYGEN SATURATION: 95 % | HEART RATE: 59 BPM | WEIGHT: 315 LBS | TEMPERATURE: 98.1 F

## 2023-02-15 DIAGNOSIS — I10 ESSENTIAL HYPERTENSION: ICD-10-CM

## 2023-02-15 DIAGNOSIS — Z00.00 MEDICARE ANNUAL WELLNESS VISIT, SUBSEQUENT: ICD-10-CM

## 2023-02-15 DIAGNOSIS — R73.03 PREDIABETES: ICD-10-CM

## 2023-02-15 DIAGNOSIS — E66.01 MORBID (SEVERE) OBESITY DUE TO EXCESS CALORIES (HCC): ICD-10-CM

## 2023-02-15 DIAGNOSIS — E66.01 MORBID OBESITY WITH BMI OF 40.0-44.9, ADULT (HCC): ICD-10-CM

## 2023-02-15 LAB
HBA1C MFR BLD: 6.2 % (ref ?–5.8)
POCT INT CON NEG: NEGATIVE
POCT INT CON POS: POSITIVE

## 2023-02-15 PROCEDURE — 83036 HEMOGLOBIN GLYCOSYLATED A1C: CPT | Performed by: FAMILY MEDICINE

## 2023-02-15 PROCEDURE — G0438 PPPS, INITIAL VISIT: HCPCS | Performed by: FAMILY MEDICINE

## 2023-02-15 RX ORDER — SILDENAFIL 50 MG/1
50 TABLET, FILM COATED ORAL
Qty: 10 TABLET | Refills: 3 | Status: SHIPPED | OUTPATIENT
Start: 2023-02-15 | End: 2024-02-08

## 2023-02-15 ASSESSMENT — ENCOUNTER SYMPTOMS: GENERAL WELL-BEING: GOOD

## 2023-02-15 ASSESSMENT — PATIENT HEALTH QUESTIONNAIRE - PHQ9: CLINICAL INTERPRETATION OF PHQ2 SCORE: 0

## 2023-02-15 ASSESSMENT — FIBROSIS 4 INDEX: FIB4 SCORE: 1.21

## 2023-02-15 ASSESSMENT — ACTIVITIES OF DAILY LIVING (ADL): BATHING_REQUIRES_ASSISTANCE: 0

## 2023-02-15 NOTE — PROGRESS NOTES
Chief Complaint   Patient presents with    Annual Exam       HPI:  Car is a 67 y.o. here for Medicare Annual Wellness Visit        Patient Active Problem List    Diagnosis Date Noted    Atypical angina (HCC) 08/29/2022    Morbid obesity with BMI of 40.0-44.9, adult (HCC) 01/09/2017    Sleep disorder breathing 10/17/2014    Prediabetes 07/28/2014    BPH (benign prostatic hyperplasia) 08/06/2013    Vitamin D insufficiency 06/27/2012    Hypotestosteronism 06/27/2012    HTN (hypertension) 06/13/2012    Hyperlipidemia 06/13/2012       Current Outpatient Medications   Medication Sig Dispense Refill    amLODIPine (NORVASC) 10 MG Tab TAKE 1 TABLET DAILY 90 Tablet 3    hydroCHLOROthiazide (HYDRODIURIL) 25 MG Tab Take 1 Tablet by mouth every day. 90 Tablet 3    acetaminophen (TYLENOL) 500 MG Tab Take 1,000 mg by mouth every 6 hours as needed.      Lactobacillus Rhamnosus, GG, (CULTURELLE IMMUNITY SUPPORT PO) Take 1 Tablet by mouth every day.      metoprolol SR (TOPROL XL) 25 MG TABLET SR 24 HR Take 1 Tablet by mouth every evening. 90 Tablet 3    lisinopril (PRINIVIL) 40 MG tablet Take 1 Tablet by mouth every day. 90 Tablet 3    nitroglycerin (NITROSTAT) 0.4 MG SL Tab Place 1 Tablet under the tongue as needed for Chest Pain. 25 Tablet 1    atorvastatin (LIPITOR) 40 MG Tab Take 1 Tablet by mouth every evening. 90 Tablet 1    fluticasone (FLONASE) 50 MCG/ACT nasal spray Administer 1 Spray into affected nostril(S) every day.      albuterol 108 (90 Base) MCG/ACT Aero Soln inhalation aerosol Inhale 1-2 Puffs every four hours as needed for Shortness of Breath. 18 g 3    MULTIPLE VITAMIN PO Take 1 Tab by mouth every day.       No current facility-administered medications for this visit.        Patient is taking medications as noted in medication list.  Current supplements as per medication list.     Allergies: Patient has no known allergies.    Current social contact/activities:      Is patient current with immunizations?  Yes.    He  reports that he has never smoked. He has never used smokeless tobacco. He reports current alcohol use. He reports that he does not use drugs.  Counseling given: Not Answered      ROS:    Gait: Uses no assistive device   Ostomy: No   Other tubes: No   Amputations: No   Chronic oxygen use No   Last eye exam 2022   Wears hearing aids: No   : Denies any urinary leakage during the last 6 months    Screening:    Depression Screening  Little interest or pleasure in doing things?  0 - not at all  Feeling down, depressed, or hopeless? 0 - not at all  Patient Health Questionnaire Score: 0    If depressive symptoms identified deferred to follow up visit unless specifically addressed in assessment and plan.    Interpretation of PHQ-9 Total Score   Score Severity   1-4 No Depression   5-9 Mild Depression   10-14 Moderate Depression   15-19 Moderately Severe Depression   20-27 Severe Depression    Screening for Cognitive Impairment  Three Minute Recall (daughter, heaven, saad)  2/3    Prince clock face with all 12 numbers and set the hands to show 10 past 11.  Yes    If cognitive concerns identified, deferred for follow up unless specifically addressed in assessment and plan.    Fall Risk Assessment  Has the patient had two or more falls in the last year or any fall with injury in the last year?  No  If fall risk identified, deferred for follow up unless specifically addressed in assessment and plan.    Safety Assessment  Throw rugs on floor.  Yes  Handrails on all stairs.  Yes  Good lighting in all hallways.  Yes  Difficulty hearing.  No  Patient counseled about all safety risks that were identified.    Functional Assessment ADLs  Are there any barriers preventing you from cooking for yourself or meeting nutritional needs?  No.    Are there any barriers preventing you from driving safely or obtaining transportation?  No.    Are there any barriers preventing you from using a telephone or calling for help?  No.     Are there any barriers preventing you from shopping?  No.    Are there any barriers preventing you from taking care of your own finances?  No.    Are there any barriers preventing you from managing your medications?  No.    Are there any barriers preventing you from showering, bathing or dressing yourself?  No.    Are you currently engaging in any exercise or physical activity?  Yes.     What is your perception of your health?  Good.    Advance Care Planning  Do you have an Advance Directive, Living Will, Durable Power of , or POLST? Yes    Living Will     is not on file - instructed patient to bring in a copy to scan into their chart    Health Maintenance Summary            Overdue - Annual Wellness Visit (Every 366 Days) Overdue since 7/22/2022 07/21/2021  Visit Dx: Medicare annual wellness visit, initial    07/19/2021  Visit Dx: Medicare annual wellness visit, initial              Postponed - IMM ZOSTER VACCINES (2 of 2) Postponed until 2/19/2023      07/15/2022  Imm Admin: Zoster Vaccine Recombinant (RZV) (SHINGRIX)              Postponed - COVID-19 Vaccine (3 - Booster for Moderna series) Postponed until 8/8/2023      05/10/2021  Imm Admin: MODERNA SARS-COV-2 VACCINE (12+)    04/12/2021  Imm Admin: MODERNA SARS-COV-2 VACCINE (12+)              Postponed - IMM INFLUENZA (1) Postponed until 9/19/2023 11/07/2017  Imm Admin: Influenza Vaccine Quad Inj (Pf)              Postponed - IMM PNEUMOCOCCAL VACCINE: 65+ Years (2 - PCV) Postponed until 2/15/2024      02/07/2022  Imm Admin: Pneumococcal polysaccharide vaccine (PPSV-23)              COLORECTAL CANCER SCREENING (COLONOSCOPY - Every 10 Years) Next due on 11/12/2023 11/12/2013  AMB REFERRAL TO GI FOR COLONOSCOPY    04/05/2011  OCCULT BLOOD FECES IMMUNOASSAY              IMM DTaP/Tdap/Td Vaccine (2 - Td or Tdap) Next due on 11/7/2027 11/07/2017  Imm Admin: Tdap Vaccine              IMM HEP B VACCINE (Series Information) Aged Out       No completion history exists for this topic.              IMM MENINGOCOCCAL ACWY VACCINE (Series Information) Aged Out      No completion history exists for this topic.              Discontinued - HEPATITIS C SCREENING  Discontinued      No completion history exists for this topic.                    Patient Care Team:  Jonathan Reyes M.D. as PCP - General (Family Medicine)  Isaac Hanks M.D. as Consulting Physician (Orthopedic Surgery)    Social History     Tobacco Use    Smoking status: Never    Smokeless tobacco: Never   Vaping Use    Vaping Use: Never used   Substance Use Topics    Alcohol use: Yes     Comment: 3-4 times a week, 1-2 drinks.    Drug use: No     Family History   Problem Relation Age of Onset    Heart Failure Mother     Genetic Disorder Father         alzheimer's    Hypertension Sister     Hypertension Sister     Diabetes Sister     Cancer Maternal Aunt         breast cancer     He  has a past medical history of HDL deficiency (06/13/2012), High cholesterol, HTN (hypertension) (06/13/2012), Hyperlipidemia (06/13/2012), Hypertriglyceridemia (06/13/2012), Hypotestosteronism (06/27/2012), Seasonal allergies, Sleep disorder breathing (10/17/2014), Snoring, and Vitamin d deficiency (06/27/2012).   Past Surgical History:   Procedure Laterality Date    KNEE ARTHROSCOPY  2005    right knee    EYE SURGERY      cataract surgery     INCISION HERNIA REPAIR  1970s    TONSILLECTOMY         Exam:   /68 (BP Location: Left arm, Patient Position: Sitting, BP Cuff Size: Large adult)   Pulse (!) 59   Temp 36.7 °C (98.1 °F) (Temporal)   Resp 16   Ht 1.829 m (6')   Wt (!) 150 kg (330 lb)   SpO2 95%  Body mass index is 44.76 kg/m².    Hearing excellent.    Dentition good  Alert, oriented in no acute distress.  Eye contact is good, speech goal directed, affect calm      Assessment and Plan. The following treatment and monitoring plan is recommended:    1. Medicare annual wellness visit, subsequent  Utd  on hm    2. Essential hypertension  Currently treated for HTN, taking meds with no CP or sob, monitors bp at home periodically. controlled      3. Prediabetes  A1c 6.2  Due to the patient's issues with glucose management, today they were extensively counseled on a low carb diet and exercise and losing weight    - POCT Hemoglobin A1C    4. Morbid obesity with BMI of 40.0-44.9, adult ([facility])  Patient has a diagnosis of obesity. They were counseled today on increasing exercise and  extensively counseled on a low carb diet       5. Morbid (severe) obesity due to excess calories (HCC)      6. Body mass index (BMI) 40.0-44.9, adult ([facility])      Past Medical History:   Diagnosis Date    HDL deficiency 06/13/2012    High cholesterol     HTN (hypertension) 06/13/2012    Hyperlipidemia 06/13/2012    Hypertriglyceridemia 06/13/2012    Hypotestosteronism 06/27/2012    Seasonal allergies     Inhaler us as needed.    Sleep disorder breathing 10/17/2014    Oct 2014. Abnormal overnight oximetry. 136 desats.      Snoring     Vitamin d deficiency 06/27/2012     Past Surgical History:   Procedure Laterality Date    KNEE ARTHROSCOPY  2005    right knee    EYE SURGERY      cataract surgery     INCISION HERNIA REPAIR  1970s    TONSILLECTOMY         Social History     Tobacco Use    Smoking status: Never    Smokeless tobacco: Never   Vaping Use    Vaping Use: Never used   Substance Use Topics    Alcohol use: Yes     Comment: 3-4 times a week, 1-2 drinks.    Drug use: No       Family History   Problem Relation Age of Onset    Heart Failure Mother     Genetic Disorder Father         alzheimer's    Hypertension Sister     Hypertension Sister     Diabetes Sister     Cancer Maternal Aunt         breast cancer         Current Outpatient Medications:     amLODIPine (NORVASC) 10 MG Tab, TAKE 1 TABLET DAILY, Disp: 90 Tablet, Rfl: 3    hydroCHLOROthiazide (HYDRODIURIL) 25 MG Tab, Take 1 Tablet by mouth every day., Disp: 90 Tablet, Rfl: 3     acetaminophen (TYLENOL) 500 MG Tab, Take 1,000 mg by mouth every 6 hours as needed., Disp: , Rfl:     Lactobacillus Rhamnosus, GG, (CULTURELLE IMMUNITY SUPPORT PO), Take 1 Tablet by mouth every day., Disp: , Rfl:     metoprolol SR (TOPROL XL) 25 MG TABLET SR 24 HR, Take 1 Tablet by mouth every evening., Disp: 90 Tablet, Rfl: 3    lisinopril (PRINIVIL) 40 MG tablet, Take 1 Tablet by mouth every day., Disp: 90 Tablet, Rfl: 3    nitroglycerin (NITROSTAT) 0.4 MG SL Tab, Place 1 Tablet under the tongue as needed for Chest Pain., Disp: 25 Tablet, Rfl: 1    atorvastatin (LIPITOR) 40 MG Tab, Take 1 Tablet by mouth every evening., Disp: 90 Tablet, Rfl: 1    fluticasone (FLONASE) 50 MCG/ACT nasal spray, Administer 1 Spray into affected nostril(S) every day., Disp: , Rfl:     albuterol 108 (90 Base) MCG/ACT Aero Soln inhalation aerosol, Inhale 1-2 Puffs every four hours as needed for Shortness of Breath., Disp: 18 g, Rfl: 3    MULTIPLE VITAMIN PO, Take 1 Tab by mouth every day., Disp: , Rfl:     Patient was instructed on the use of medications, either prescriptions or OTC and informed on when the appropriate follow up time period should be. In addition, patient was also instructed that should any acute worsening occur that they should notify this clinic asap or call 911.        Services suggested:   Health Care Screening recommendations as per orders if indicated.  Referrals offered: PT/OT/Nutrition counseling/Behavioral Health/Smoking cessation as per orders if indicated.    Discussion today about general wellness and lifestyle habits:    Prevent falls and reduce trip hazards; Cautioned about securing or removing rugs.  Have a working fire alarm and carbon monoxide detector;   Engage in regular physical activity and social activities     Follow-up: No follow-ups on file.

## 2023-04-03 ENCOUNTER — PATIENT MESSAGE (OUTPATIENT)
Dept: CARDIOLOGY | Facility: MEDICAL CENTER | Age: 68
End: 2023-04-03
Payer: MEDICARE

## 2023-04-14 DIAGNOSIS — E66.01 MORBID OBESITY WITH BMI OF 40.0-44.9, ADULT (HCC): ICD-10-CM

## 2023-04-14 DIAGNOSIS — I10 ESSENTIAL HYPERTENSION: ICD-10-CM

## 2023-04-14 DIAGNOSIS — R73.03 PREDIABETES: ICD-10-CM

## 2023-04-14 DIAGNOSIS — E78.2 MIXED HYPERLIPIDEMIA: ICD-10-CM

## 2023-04-14 NOTE — TELEPHONE ENCOUNTER
Is the patient due for a refill? Yes    Was the patient seen the past year? Yes    Date of last office visit: 10/20/2022    Does the patient have an upcoming appointment?  Yes   If yes, When? 5/24/2023    Provider to refill:AL    Does the patients insurance require a 100 day supply?  No

## 2023-04-17 RX ORDER — ATORVASTATIN CALCIUM 40 MG/1
TABLET, FILM COATED ORAL
Qty: 90 TABLET | Refills: 1 | Status: SHIPPED | OUTPATIENT
Start: 2023-04-17 | End: 2024-01-12 | Stop reason: SDUPTHER

## 2023-05-24 ENCOUNTER — OFFICE VISIT (OUTPATIENT)
Dept: CARDIOLOGY | Facility: MEDICAL CENTER | Age: 68
End: 2023-05-24
Attending: INTERNAL MEDICINE
Payer: MEDICARE

## 2023-05-24 VITALS
BODY MASS INDEX: 42.66 KG/M2 | HEIGHT: 72 IN | RESPIRATION RATE: 16 BRPM | SYSTOLIC BLOOD PRESSURE: 132 MMHG | OXYGEN SATURATION: 93 % | HEART RATE: 61 BPM | WEIGHT: 315 LBS | DIASTOLIC BLOOD PRESSURE: 72 MMHG

## 2023-05-24 DIAGNOSIS — R07.89 OTHER CHEST PAIN: ICD-10-CM

## 2023-05-24 DIAGNOSIS — R94.39 ABNORMAL CARDIOVASCULAR STRESS TEST: ICD-10-CM

## 2023-05-24 DIAGNOSIS — Q24.5 CORONARY-MYOCARDIAL BRIDGE: ICD-10-CM

## 2023-05-24 DIAGNOSIS — E66.01 MORBID OBESITY WITH BMI OF 40.0-44.9, ADULT (HCC): ICD-10-CM

## 2023-05-24 DIAGNOSIS — I20.89 STABLE ANGINA (HCC): Primary | ICD-10-CM

## 2023-05-24 DIAGNOSIS — E66.01 CLASS 3 SEVERE OBESITY WITHOUT SERIOUS COMORBIDITY WITH BODY MASS INDEX (BMI) OF 40.0 TO 44.9 IN ADULT, UNSPECIFIED OBESITY TYPE (HCC): ICD-10-CM

## 2023-05-24 PROBLEM — E66.813 CLASS 3 SEVERE OBESITY WITHOUT SERIOUS COMORBIDITY WITH BODY MASS INDEX (BMI) OF 40.0 TO 44.9 IN ADULT (HCC): Status: ACTIVE | Noted: 2017-01-09

## 2023-05-24 PROCEDURE — 3078F DIAST BP <80 MM HG: CPT | Performed by: INTERNAL MEDICINE

## 2023-05-24 PROCEDURE — 99213 OFFICE O/P EST LOW 20 MIN: CPT | Performed by: INTERNAL MEDICINE

## 2023-05-24 PROCEDURE — 99214 OFFICE O/P EST MOD 30 MIN: CPT | Performed by: INTERNAL MEDICINE

## 2023-05-24 PROCEDURE — 3075F SYST BP GE 130 - 139MM HG: CPT | Performed by: INTERNAL MEDICINE

## 2023-05-24 RX ORDER — METOPROLOL SUCCINATE 25 MG/1
12.5 TABLET, EXTENDED RELEASE ORAL EVERY EVENING
Qty: 45 TABLET | Refills: 3 | Status: SHIPPED | OUTPATIENT
Start: 2023-05-24 | End: 2024-01-12 | Stop reason: SDUPTHER

## 2023-05-24 ASSESSMENT — FIBROSIS 4 INDEX: FIB4 SCORE: 1.21

## 2023-05-24 NOTE — PROGRESS NOTES
CARDIOLOGY established PATIENT:    PCP: Jonathan Reyes M.D.    1. Stable angina ([facility])    2. Morbid obesity with BMI of 40.0-44.9, adult ([facility])    3. Class 3 severe obesity without serious comorbidity with body mass index (BMI) of 40.0 to 44.9 in adult, unspecified obesity type ([facility])    4. Coronary-myocardial bridge    5. Other chest pain    6. Abnormal cardiovascular stress test        Car Coyle is here for follow-up for stable angina    Chief Complaint   Patient presents with    Abnormal Cardiac Function Testing     F/V Dx: Abnormal cardiovascular stress test    Hypertension       History: Car Coyle is a 67 y.o. male with class 3 obesity BMI 44.89, HTN, LAD bridge, is here for follow-up for stable angina    BP log: every few days, 130/50's mmHg. hR 60's.    Feels great. Not tired as prior since he decreased his metoprolol back to 12.5mg few months ago.    6 weeks ago he had slight chest discomfort, non limiting. Remains active, no exercise routine. 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 Main limitation or hip and knee pains /stiffness.    Denies shortness of breath, significant dyspnea on exertion, significant lower extremity, orthopnea, PND. Has ED with suboptimal benefit from sildenafil.  Denies claudication, lightheadedness, dizziness, syncope or presyncope.    Non smoker.  Alcohol: 1-2 drinks every other day  No marijuana use    Wife with him today    Lipid panel 7/2021: on atorvastatin 40mg  ,     LHC / IFR 11/2022: Personally performed  IMPRESSION:  Mid LAD myocardial bridge IFR 0.79  Nonobstructive CAD otherwise  Hypertension  Elevated resting LVEDP with no significant transaortic gradient on pullback  RECOMMENDATIONS:  TR band protocol  Increase metoprolol to succinate to 25 mg at nighttime  Stop aspirin  Advised patient to start taking his 40 mg of lisinopril instead of 30 mg and send me in a.m. and p.m. blood pressure log in 1 week to make further  antihypertensive medication adjustments which will help optimize his LVEDP.  Continued efforts with eating heart healthy diet, weight loss and incorporate more aerobic exercise.    Functional status:    MMRC Grade: 0-1  0: Breathless only during strenuous exercise  1: Short of breath when hurrying or going up a small hill  2: Walks slower than friends due to breathlessness, has to stop at own pace  3: Stops to catch breath on level ground after 100m  4: Breathless with ambulating around house or ADLs    PE:  /72 (BP Location: Left arm, Patient Position: Sitting, BP Cuff Size: Adult)   Pulse 61   Resp 16   Ht 1.829 m (6')   Wt (!) 150 kg (331 lb)   SpO2 93%   BMI 44.89 kg/m²     Gen: well  HEENT: Symmetric face. Anicteric sclerae. Moist mucus membranes  NECK: No JVD.   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    Sleep disorder breathing 10/17/2014    Oct 2014. Abnormal overnight oximetry. 136 desats.      Snoring     Vitamin d deficiency 06/27/2012     Past Surgical History:   Procedure Laterality Date    KNEE ARTHROSCOPY  2005    right knee    EYE SURGERY      cataract surgery     INCISION HERNIA REPAIR  1970s    TONSILLECTOMY       No Known Allergies  Outpatient Encounter Medications as of 5/24/2023   Medication Sig Dispense Refill    Multiple Vitamins-Minerals (MENS MULTI VITAMIN & MINERAL  PO) Take  by mouth.      Semaglutide,0.25 or 0.5MG/DOS, 2 MG/3ML Solution Pen-injector Inject 0.25 mg under the skin every 7 days. 3 mL 6    metoprolol SR (TOPROL XL) 25 MG TABLET SR 24 HR Take 0.5 Tablets by mouth every evening. 45 Tablet 3    atorvastatin (LIPITOR) 40 MG Tab TAKE 1 TABLET BY MOUTH EVERY DAY IN THE EVENING 90 Tablet 1    sildenafil citrate (VIAGRA) 50 MG tablet Take 1 Tablet by mouth 1 time a day as needed for Erectile Dysfunction. 10 Tablet 3    amLODIPine (NORVASC) 10 MG Tab TAKE 1 TABLET DAILY 90 Tablet 3    hydroCHLOROthiazide (HYDRODIURIL) 25 MG Tab Take 1 Tablet by mouth every day. 90 Tablet 3    acetaminophen (TYLENOL) 500 MG Tab Take 1,000 mg by mouth every 6 hours as needed.      Lactobacillus Rhamnosus, GG, (CULTURELLE IMMUNITY SUPPORT PO) Take 1 Tablet by mouth every day.      lisinopril (PRINIVIL) 40 MG tablet Take 1 Tablet by mouth every day. 90 Tablet 3    nitroglycerin (NITROSTAT) 0.4 MG SL Tab Place 1 Tablet under the tongue as needed for Chest Pain. 25 Tablet 1    fluticasone (FLONASE) 50 MCG/ACT nasal spray Administer 1 Spray into affected nostril(S) every day.      albuterol 108 (90 Base) MCG/ACT Aero Soln inhalation aerosol Inhale 1-2 Puffs every four hours as needed for Shortness of Breath. 18 g 3    MULTIPLE VITAMIN PO Take 1 Tab by mouth every day.      [DISCONTINUED] metoprolol SR (TOPROL XL) 25 MG TABLET SR 24 HR Take 1 Tablet by mouth every evening. 90 Tablet 3     No facility-administered encounter medications on file as of 5/24/2023.     Social History     Socioeconomic History    Marital status:      Spouse name: Not on file    Number of children: 1    Years of education: Not on file    Highest education level: Not on file   Occupational History    Occupation: Realtor Residential     Employer: unknown   Tobacco Use    Smoking status: Never    Smokeless tobacco: Never   Vaping Use    Vaping Use: Never used   Substance and Sexual Activity    Alcohol use: Yes      Comment: 3-4 times a week, 1-2 drinks.    Drug use: No    Sexual activity: Yes     Partners: Female   Other Topics Concern    Not on file   Social History Narrative    Employed as Realtor. Plays [employer] twice per week.      Social Determinants of Health     Financial Resource Strain: Not on file   Food Insecurity: Not on file   Transportation Needs: Not on file   Physical Activity: Not on file   Stress: Not on file   Social Connections: Not on file   Intimate Partner Violence: Not on file   Housing Stability: Not on file     Family History   Problem Relation Age of Onset    Heart Failure Mother     Genetic Disorder Father         alzheimer's    Hypertension Sister     Hypertension Sister     Diabetes Sister     Cancer Maternal Aunt         breast cancer         Studies    Lab Results   Component Value Date/Time    TSHULTRASEN 2.160 01/03/2017 0807        Lab Results   Component Value Date/Time    FREET4 1.37 06/12/2020 0807      Lab Results   Component Value Date/Time    HBA1C 6.2 (A) 02/15/2023 08:54 AM     Lab Results   Component Value Date/Time    CHOLSTRLTOT 168 07/21/2021 09:10 AM     (H) 07/21/2021 09:10 AM    HDL 39 (A) 07/21/2021 09:10 AM    TRIGLYCERIDE 142 07/21/2021 09:10 AM       Lab Results   Component Value Date/Time    SODIUM 139 11/02/2022 06:40 AM    POTASSIUM 4.8 11/02/2022 06:40 AM    CHLORIDE 104 11/02/2022 06:40 AM    CO2 22 11/02/2022 06:40 AM    GLUCOSE 110 (H) 11/02/2022 06:40 AM    BUN 19 11/02/2022 06:40 AM    CREATININE 0.93 11/02/2022 06:40 AM     Lab Results   Component Value Date/Time    ALKPHOSPHAT 79 11/02/2022 06:40 AM    ASTSGOT 23 11/02/2022 06:40 AM    ALTSGPT 29 11/02/2022 06:40 AM    TBILIRUBIN 0.6 11/02/2022 06:40 AM        Echocardiogram:  No results found for this or any previous visit.      Assessment and Recommendations:    Problem List Items Addressed This Visit       Class 3 severe obesity without serious comorbidity with body mass index (BMI) of 40.0 to 44.9  in adult (HCC)    Relevant Medications    Semaglutide,0.25 or 0.5MG/DOS, 2 MG/3ML Solution Pen-injector    Stable angina ([facility]) - Primary    Relevant Medications    Semaglutide,0.25 or 0.5MG/DOS, 2 MG/3ML Solution Pen-injector    metoprolol SR (TOPROL XL) 25 MG TABLET SR 24 HR    Coronary-myocardial bridge    Relevant Medications    metoprolol SR (TOPROL XL) 25 MG TABLET SR 24 HR     Other Visit Diagnoses       Other chest pain        Relevant Medications    metoprolol SR (TOPROL XL) 25 MG TABLET SR 24 HR    Abnormal cardiovascular stress test        Relevant Medications    metoprolol SR (TOPROL XL) 25 MG TABLET SR 24 HR          Mr. Coyle is doing well from cardiovascular standpoint and feeling less tired on the lower dose of metoprolol.  No current clinical concerns of limiting angina due to his underlying LAD myocardial bridge, stable symptoms.    Blood pressure seems to be controlled and at goal at home: Continue hydrochlorothiazide 25 mg daily, amlodipine 10 mg and lisinopril 40 mg.    Printed his lab ordres to be done. Due to lipid panel recheck: target TG < 150, LDL < 100.  On atorvastatin 40 mg, for primary ASCVD prevention    Off ASA.  No current clinical indication    Discussed with him at length importance of weight loss especially for his primary ASCVD prevention, stable angina, hypertension, erectile dysfunction and dyslipidemia management.  Discussed with him the risks and benefits of trying Ozempic and advised him to keep me posted on a monthly basis so we can further increase the dose to the target dose.    Ozempic / Semaglutide:    Note: For use as an adjunct to diet and exercise in patients with a BMI ?30 kg/m2, or in patients with a BMI ?27 kg/m2 and ?1 weight-associated comorbidity (eg, hypertension, dyslipidemia).    SUBQ: Initiate and adjust dose using the following schedule: In patients who do not tolerate a dosage increase, consider delaying the increase for an additional 4 weeks:  Week 1  through week 4 : 0.25 mg once weekly.  Week 5 through week 8: 0.5 mg once weekly.  Week 9 through week 12: 1 mg once weekly.  Week 13 through week 16: 1.7 mg once weekly.  Week 17 and thereafter (maintenance dosage): 2.4 mg once weekly; if not tolerated, may temporarily decrease dosage to 1.7 mg once weekly for up to 4 additional weeks, then increase to 2.4 mg once weekly.    Note: The  recommends discontinuing therapy in patients who cannot tolerate the 2.4 mg/week dosage; however, some patients may achieve goal weight loss on a submaximal dose and may continue on the maximum tolerated dose (even if <2.4 mg/week). Consider discontinuation if at least 5% of baseline body weight loss has not been achieved within 3 months .    Missed dose: Missed dose should be administered as soon as possible within 5 days; resume usual schedule thereafter. If >5 days have elapsed, skip the missed dose and resume administration at the next scheduled weekly dose. If 2 or more consecutive doses are missed, resume dosing as scheduled; alternatively, may reinitiate dosage adjustment schedule.    The following relatively common adverse drug reactions and incidences are derived from product labeling unless otherwise specified. Adverse reactions reported in adults, unless otherwise noted ; >10%: Abdominal pain, constipation, diarrhea , nausea, vomiting, Fatigue , headache.     Thank you for the opportunity to be involved in Car Coyle 's care; and please reach out with any questions or concerns.    Return in about 1 year (around 5/24/2024).    Jermaine Kwon MD, MPH [facility]  Interventional Cardiologist  [facility]   of Clinical Internal Medicine - [facility] Keegan FONTAINE    ~ Portions of this note were completed using voice recognition software (Dragon Naturally speaking software) . Occasional transcription errors may have escaped proof reading. I have made  every reasonable attempt to correct obvious errors, but I expect that there are errors of grammar and possibly content that I did not discover before finalizing the note. ~

## 2023-05-24 NOTE — PATIENT INSTRUCTIONS
Start ozempic 0.25mg injection every week and in 4 weeks send me an update on your progress before we keep increasing the dose.  Keep your metoprolol at 12.5mg instead of 25mg

## 2023-05-26 LAB — HBA1C MFR BLD: 6.1 % (ref ?–5.8)

## 2023-06-03 SDOH — HEALTH STABILITY: PHYSICAL HEALTH: ON AVERAGE, HOW MANY MINUTES DO YOU ENGAGE IN EXERCISE AT THIS LEVEL?: 100 MIN

## 2023-06-03 SDOH — ECONOMIC STABILITY: FOOD INSECURITY: WITHIN THE PAST 12 MONTHS, THE FOOD YOU BOUGHT JUST DIDN'T LAST AND YOU DIDN'T HAVE MONEY TO GET MORE.: NEVER TRUE

## 2023-06-03 SDOH — ECONOMIC STABILITY: HOUSING INSECURITY
IN THE LAST 12 MONTHS, WAS THERE A TIME WHEN YOU DID NOT HAVE A STEADY PLACE TO SLEEP OR SLEPT IN A SHELTER (INCLUDING NOW)?: NO

## 2023-06-03 SDOH — ECONOMIC STABILITY: HOUSING INSECURITY: IN THE LAST 12 MONTHS, HOW MANY PLACES HAVE YOU LIVED?: 1

## 2023-06-03 SDOH — ECONOMIC STABILITY: INCOME INSECURITY: IN THE LAST 12 MONTHS, WAS THERE A TIME WHEN YOU WERE NOT ABLE TO PAY THE MORTGAGE OR RENT ON TIME?: NO

## 2023-06-03 SDOH — HEALTH STABILITY: PHYSICAL HEALTH: ON AVERAGE, HOW MANY DAYS PER WEEK DO YOU ENGAGE IN MODERATE TO STRENUOUS EXERCISE (LIKE A BRISK WALK)?: 3 DAYS

## 2023-06-03 SDOH — ECONOMIC STABILITY: FOOD INSECURITY: WITHIN THE PAST 12 MONTHS, YOU WORRIED THAT YOUR FOOD WOULD RUN OUT BEFORE YOU GOT MONEY TO BUY MORE.: NEVER TRUE

## 2023-06-03 SDOH — ECONOMIC STABILITY: INCOME INSECURITY: HOW HARD IS IT FOR YOU TO PAY FOR THE VERY BASICS LIKE FOOD, HOUSING, MEDICAL CARE, AND HEATING?: NOT VERY HARD

## 2023-06-03 SDOH — ECONOMIC STABILITY: TRANSPORTATION INSECURITY
IN THE PAST 12 MONTHS, HAS LACK OF RELIABLE TRANSPORTATION KEPT YOU FROM MEDICAL APPOINTMENTS, MEETINGS, WORK OR FROM GETTING THINGS NEEDED FOR DAILY LIVING?: NO

## 2023-06-03 SDOH — ECONOMIC STABILITY: TRANSPORTATION INSECURITY
IN THE PAST 12 MONTHS, HAS LACK OF TRANSPORTATION KEPT YOU FROM MEETINGS, WORK, OR FROM GETTING THINGS NEEDED FOR DAILY LIVING?: NO

## 2023-06-03 SDOH — ECONOMIC STABILITY: TRANSPORTATION INSECURITY
IN THE PAST 12 MONTHS, HAS THE LACK OF TRANSPORTATION KEPT YOU FROM MEDICAL APPOINTMENTS OR FROM GETTING MEDICATIONS?: NO

## 2023-06-03 SDOH — HEALTH STABILITY: MENTAL HEALTH
STRESS IS WHEN SOMEONE FEELS TENSE, NERVOUS, ANXIOUS, OR CAN'T SLEEP AT NIGHT BECAUSE THEIR MIND IS TROUBLED. HOW STRESSED ARE YOU?: TO SOME EXTENT

## 2023-06-03 ASSESSMENT — SOCIAL DETERMINANTS OF HEALTH (SDOH)
IN A TYPICAL WEEK, HOW MANY TIMES DO YOU TALK ON THE PHONE WITH FAMILY, FRIENDS, OR NEIGHBORS?: TWICE A WEEK
HOW OFTEN DO YOU ATTEND CHURCH OR RELIGIOUS SERVICES?: PATIENT DECLINED
HOW MANY DRINKS CONTAINING ALCOHOL DO YOU HAVE ON A TYPICAL DAY WHEN YOU ARE DRINKING: 1 OR 2
HOW OFTEN DO YOU ATTENT MEETINGS OF THE CLUB OR ORGANIZATION YOU BELONG TO?: NEVER
WITHIN THE PAST 12 MONTHS, YOU WORRIED THAT YOUR FOOD WOULD RUN OUT BEFORE YOU GOT THE MONEY TO BUY MORE: NEVER TRUE
HOW OFTEN DO YOU GET TOGETHER WITH FRIENDS OR RELATIVES?: THREE TIMES A WEEK
DO YOU BELONG TO ANY CLUBS OR ORGANIZATIONS SUCH AS CHURCH GROUPS UNIONS, FRATERNAL OR ATHLETIC GROUPS, OR SCHOOL GROUPS?: NO
HOW OFTEN DO YOU HAVE SIX OR MORE DRINKS ON ONE OCCASION: NEVER
DO YOU BELONG TO ANY CLUBS OR ORGANIZATIONS SUCH AS CHURCH GROUPS UNIONS, FRATERNAL OR ATHLETIC GROUPS, OR SCHOOL GROUPS?: NO
HOW OFTEN DO YOU GET TOGETHER WITH FRIENDS OR RELATIVES?: THREE TIMES A WEEK
HOW HARD IS IT FOR YOU TO PAY FOR THE VERY BASICS LIKE FOOD, HOUSING, MEDICAL CARE, AND HEATING?: NOT VERY HARD
HOW OFTEN DO YOU ATTEND CHURCH OR RELIGIOUS SERVICES?: PATIENT DECLINED
IN A TYPICAL WEEK, HOW MANY TIMES DO YOU TALK ON THE PHONE WITH FAMILY, FRIENDS, OR NEIGHBORS?: TWICE A WEEK
HOW OFTEN DO YOU HAVE A DRINK CONTAINING ALCOHOL: 4 OR MORE TIMES A WEEK
HOW OFTEN DO YOU ATTENT MEETINGS OF THE CLUB OR ORGANIZATION YOU BELONG TO?: NEVER

## 2023-06-03 ASSESSMENT — LIFESTYLE VARIABLES
HOW OFTEN DO YOU HAVE A DRINK CONTAINING ALCOHOL: 4 OR MORE TIMES A WEEK
HOW OFTEN DO YOU HAVE SIX OR MORE DRINKS ON ONE OCCASION: NEVER
SKIP TO QUESTIONS 9-10: 1
AUDIT-C TOTAL SCORE: 4
HOW MANY STANDARD DRINKS CONTAINING ALCOHOL DO YOU HAVE ON A TYPICAL DAY: 1 OR 2

## 2023-06-05 ENCOUNTER — OFFICE VISIT (OUTPATIENT)
Dept: MEDICAL GROUP | Facility: PHYSICIAN GROUP | Age: 68
End: 2023-06-05
Payer: MEDICARE

## 2023-06-05 VITALS
WEIGHT: 315 LBS | HEART RATE: 67 BPM | HEIGHT: 72 IN | RESPIRATION RATE: 16 BRPM | TEMPERATURE: 97.4 F | BODY MASS INDEX: 42.66 KG/M2 | DIASTOLIC BLOOD PRESSURE: 68 MMHG | OXYGEN SATURATION: 93 % | SYSTOLIC BLOOD PRESSURE: 130 MMHG

## 2023-06-05 DIAGNOSIS — I10 PRIMARY HYPERTENSION: ICD-10-CM

## 2023-06-05 DIAGNOSIS — I10 ESSENTIAL HYPERTENSION: ICD-10-CM

## 2023-06-05 DIAGNOSIS — E66.01 MORBID OBESITY WITH BMI OF 40.0-44.9, ADULT (HCC): ICD-10-CM

## 2023-06-05 DIAGNOSIS — J06.9 VIRAL URI WITH COUGH: ICD-10-CM

## 2023-06-05 DIAGNOSIS — Z00.00 MEDICARE ANNUAL WELLNESS VISIT, SUBSEQUENT: ICD-10-CM

## 2023-06-05 DIAGNOSIS — R73.03 PREDIABETES: ICD-10-CM

## 2023-06-05 DIAGNOSIS — E78.2 MIXED HYPERLIPIDEMIA: ICD-10-CM

## 2023-06-05 PROCEDURE — 99214 OFFICE O/P EST MOD 30 MIN: CPT | Performed by: FAMILY MEDICINE

## 2023-06-05 PROCEDURE — 3078F DIAST BP <80 MM HG: CPT | Performed by: FAMILY MEDICINE

## 2023-06-05 PROCEDURE — 3075F SYST BP GE 130 - 139MM HG: CPT | Performed by: FAMILY MEDICINE

## 2023-06-05 RX ORDER — ALBUTEROL SULFATE 90 UG/1
1-2 AEROSOL, METERED RESPIRATORY (INHALATION) EVERY 4 HOURS PRN
Qty: 18 G | Refills: 3 | Status: SHIPPED | OUTPATIENT
Start: 2023-06-05

## 2023-06-05 ASSESSMENT — PATIENT HEALTH QUESTIONNAIRE - PHQ9: CLINICAL INTERPRETATION OF PHQ2 SCORE: 0

## 2023-06-05 ASSESSMENT — FIBROSIS 4 INDEX: FIB4 SCORE: 1.21

## 2023-06-05 ASSESSMENT — ENCOUNTER SYMPTOMS: GENERAL WELL-BEING: GOOD

## 2023-06-05 ASSESSMENT — ACTIVITIES OF DAILY LIVING (ADL): BATHING_REQUIRES_ASSISTANCE: 0

## 2023-06-05 NOTE — PROGRESS NOTES
Chief Complaint   Patient presents with    Medicare Annual Wellness     Lab results in media        HPI:  Car is a 67 y.o. here for Medicare Annual Wellness Visit        Patient Active Problem List    Diagnosis Date Noted    Stable angina (HCC) 05/24/2023    Coronary-myocardial bridge 05/24/2023    Class 3 severe obesity without serious comorbidity with body mass index (BMI) of 40.0 to 44.9 in adult (HCC) 01/09/2017    Sleep disorder breathing 10/17/2014    Prediabetes 07/28/2014    BPH (benign prostatic hyperplasia) 08/06/2013    Vitamin D insufficiency 06/27/2012    Hypotestosteronism 06/27/2012    HTN (hypertension) 06/13/2012    Hyperlipidemia 06/13/2012       Current Outpatient Medications   Medication Sig Dispense Refill    metoprolol SR (TOPROL XL) 25 MG TABLET SR 24 HR Take 0.5 Tablets by mouth every evening. 45 Tablet 3    atorvastatin (LIPITOR) 40 MG Tab TAKE 1 TABLET BY MOUTH EVERY DAY IN THE EVENING 90 Tablet 1    sildenafil citrate (VIAGRA) 50 MG tablet Take 1 Tablet by mouth 1 time a day as needed for Erectile Dysfunction. 10 Tablet 3    amLODIPine (NORVASC) 10 MG Tab TAKE 1 TABLET DAILY 90 Tablet 3    hydroCHLOROthiazide (HYDRODIURIL) 25 MG Tab Take 1 Tablet by mouth every day. 90 Tablet 3    acetaminophen (TYLENOL) 500 MG Tab Take 1,000 mg by mouth every 6 hours as needed.      lisinopril (PRINIVIL) 40 MG tablet Take 1 Tablet by mouth every day. 90 Tablet 3    nitroglycerin (NITROSTAT) 0.4 MG SL Tab Place 1 Tablet under the tongue as needed for Chest Pain. 25 Tablet 1    fluticasone (FLONASE) 50 MCG/ACT nasal spray Administer 1 Spray into affected nostril(S) every day.      albuterol 108 (90 Base) MCG/ACT Aero Soln inhalation aerosol Inhale 1-2 Puffs every four hours as needed for Shortness of Breath. 18 g 3    MULTIPLE VITAMIN PO Take 1 Tab by mouth every day.      Multiple Vitamins-Minerals (MENS MULTI VITAMIN & MINERAL PO) Take  by mouth. (Patient not taking: Reported on 6/5/2023)       Semaglutide,0.25 or 0.5MG/DOS, 2 MG/3ML Solution Pen-injector Inject 0.25 mg under the skin every 7 days. 3 mL 6    Lactobacillus Rhamnosus, GG, (CULTUREE IMMUNITY SUPPORT PO) Take 1 Tablet by mouth every day.       No current facility-administered medications for this visit.        Patient is taking medications as noted in medication list.  Current supplements as per medication list.     Allergies: Patient has no known allergies.    Current social contact/activities:      Is patient current with immunizations? Yes.    He  reports that he has never smoked. He has never used smokeless tobacco. He reports current alcohol use. He reports that he does not use drugs.  Counseling given: Not Answered      ROS:    Gait: Uses no assistive device   Ostomy: No   Other tubes: No   Amputations: No   Chronic oxygen use No   Last eye exam 2022   Wears hearing aids: No   : Denies any urinary leakage during the last 6 months    Screening:    Depression Screening  Little interest or pleasure in doing things?  0 - not at all  Feeling down, depressed, or hopeless? 0 - not at all  Patient Health Questionnaire Score: 0    If depressive symptoms identified deferred to follow up visit unless specifically addressed in assessment and plan.    Interpretation of PHQ-9 Total Score   Score Severity   1-4 No Depression   5-9 Mild Depression   10-14 Moderate Depression   15-19 Moderately Severe Depression   20-27 Severe Depression    Screening for Cognitive Impairment  Three Minute Recall (daughter, heaven, mountain)  3/3    Prince clock face with all 12 numbers and set the hands to show 10 past 11.  Yes    If cognitive concerns identified, deferred for follow up unless specifically addressed in assessment and plan.    Fall Risk Assessment  Has the patient had two or more falls in the last year or any fall with injury in the last year?  No  If fall risk identified, deferred for follow up unless specifically addressed in assessment and  plan.    Safety Assessment  Throw rugs on floor.  Yes  Handrails on all stairs.  No  Good lighting in all hallways.  Yes  Difficulty hearing.  No  Patient counseled about all safety risks that were identified.    Functional Assessment ADLs  Are there any barriers preventing you from cooking for yourself or meeting nutritional needs?  No.    Are there any barriers preventing you from driving safely or obtaining transportation?  No.    Are there any barriers preventing you from using a telephone or calling for help?  No.    Are there any barriers preventing you from shopping?  No.    Are there any barriers preventing you from taking care of your own finances?  No.    Are there any barriers preventing you from managing your medications?  No.    Are there any barriers preventing you from showering, bathing or dressing yourself?  No.    Are you currently engaging in any exercise or physical activity?  Yes.     What is your perception of your health?  Good.    Advance Care Planning  Do you have an Advance Directive, Living Will, Durable Power of , or POLST?                 Health Maintenance Summary            Postponed - COVID-19 Vaccine (3 - Booster for Moderna series) Postponed until 8/8/2023      05/10/2021  Imm Admin: MODERNA SARS-COV-2 VACCINE (12+)    04/12/2021  Imm Admin: MODERNA SARS-COV-2 VACCINE (12+)              Postponed - IMM INFLUENZA (Season Ended) Postponed until 9/19/2023 11/07/2017  Imm Admin: Influenza Vaccine Quad Inj (Pf)              Postponed - IMM PNEUMOCOCCAL VACCINE: 65+ Years (2 - PCV) Postponed until 2/15/2024      02/07/2022  Imm Admin: Pneumococcal polysaccharide vaccine (PPSV-23)              COLORECTAL CANCER SCREENING (COLONOSCOPY - Every 10 Years) Next due on 11/12/2023 11/12/2013  AMB REFERRAL TO GI FOR COLONOSCOPY    04/05/2011  OCCULT BLOOD FECES IMMUNOASSAY              Annual Wellness Visit (Every 366 Days) Next due on 2/16/2024      02/15/2023  Visit Dx:  Medicare annual wellness visit, subsequent    07/21/2021  Visit Dx: Medicare annual wellness visit, initial    07/19/2021  Visit Dx: Medicare annual wellness visit, initial              IMM DTaP/Tdap/Td Vaccine (2 - Td or Tdap) Next due on 11/7/2027 11/07/2017  Imm Admin: Tdap Vaccine              IMM ZOSTER VACCINES (Series Information) Completed      03/01/2023  Imm Admin: Zoster Vaccine Recombinant (RZV) (SHINGRIX)    07/15/2022  Imm Admin: Zoster Vaccine Recombinant (RZV) (SHINGRIX)              IMM HEP B VACCINE (Series Information) Aged Out      No completion history exists for this topic.              HPV Vaccines (Series Information) Aged Out      No completion history exists for this topic.              IMM MENINGOCOCCAL ACWY VACCINE (Series Information) Aged Out      No completion history exists for this topic.              Discontinued - HEPATITIS C SCREENING  Discontinued      No completion history exists for this topic.                    Patient Care Team:  Jonathan Reyes M.D. as PCP - General (Family Medicine)  Isaac Hanks M.D. as Consulting Physician (Orthopedic Surgery)    Social History     Tobacco Use    Smoking status: Never    Smokeless tobacco: Never   Vaping Use    Vaping Use: Never used   Substance Use Topics    Alcohol use: Yes     Comment: 3-4 times a week, 1-2 drinks.    Drug use: No     Family History   Problem Relation Age of Onset    Heart Failure Mother     Genetic Disorder Father         alzheimer's    Hypertension Sister     Hypertension Sister     Diabetes Sister     Cancer Maternal Aunt         breast cancer     He  has a past medical history of HDL deficiency (06/13/2012), High cholesterol, HTN (hypertension) (06/13/2012), Hyperlipidemia (06/13/2012), Hypertriglyceridemia (06/13/2012), Hypotestosteronism (06/27/2012), Seasonal allergies, Sleep disorder breathing (10/17/2014), Snoring, and Vitamin d deficiency (06/27/2012).   Past Surgical History:   Procedure  Laterality Date    KNEE ARTHROSCOPY  2005    right knee    EYE SURGERY      cataract surgery     INCISION HERNIA REPAIR  1970s    TONSILLECTOMY         Exam:   Pulse 67   Temp 36.3 °C (97.4 °F) (Temporal)   Resp 16   Ht 1.829 m (6')   Wt (!) 152 kg (336 lb)   SpO2 93%  Body mass index is 45.57 kg/m².    Hearing excellent.    Dentition good  Alert, oriented in no acute distress.  Eye contact is good, speech goal directed, affect calm      Assessment and Plan. The following treatment and monitoring plan is recommended:    1. Medicare annual wellness visit, subsequent  Utd on     2. Morbid obesity with BMI of 40.0-44.9, adult (HCC)  Given ozempic rx by cardiology, has not started yet    3. Prediabetes  Due to the patient's issues with glucose management, today they were extensively counseled on a low carb diet and exercise and losing weight      4. Essential hypertension  Currently treated for HTN, taking meds with no CP or sob, monitors bp at home periodically. controlled      5. Mixed hyperlipidemia  Currently treated for HLD, taking meds with no new myalgias or joint pain, watching fats in diet  controlled      6. Viral URI with cough    - albuterol 108 (90 Base) MCG/ACT Aero Soln inhalation aerosol; Inhale 1-2 Puffs every four hours as needed for Shortness of Breath.  Dispense: 18 g; Refill: 3    7. Primary hypertension      Past Medical History:   Diagnosis Date    HDL deficiency 06/13/2012    High cholesterol     HTN (hypertension) 06/13/2012    Hyperlipidemia 06/13/2012    Hypertriglyceridemia 06/13/2012    Hypotestosteronism 06/27/2012    Seasonal allergies     Inhaler us as needed.    Sleep disorder breathing 10/17/2014    Oct 2014. Abnormal overnight oximetry. 136 desats.      Snoring     Vitamin d deficiency 06/27/2012     Past Surgical History:   Procedure Laterality Date    KNEE ARTHROSCOPY  2005    right knee    EYE SURGERY      cataract surgery     INCISION HERNIA REPAIR  1970s    TONSILLECTOMY          Social History     Tobacco Use    Smoking status: Never    Smokeless tobacco: Never   Vaping Use    Vaping Use: Never used   Substance Use Topics    Alcohol use: Yes     Comment: 3-4 times a week, 1-2 drinks.    Drug use: No       Family History   Problem Relation Age of Onset    Heart Failure Mother     Genetic Disorder Father         alzheimer's    Hypertension Sister     Hypertension Sister     Diabetes Sister     Cancer Maternal Aunt         breast cancer         Current Outpatient Medications:     albuterol 108 (90 Base) MCG/ACT Aero Soln inhalation aerosol, Inhale 1-2 Puffs every four hours as needed for Shortness of Breath., Disp: 18 g, Rfl: 3    metoprolol SR (TOPROL XL) 25 MG TABLET SR 24 HR, Take 0.5 Tablets by mouth every evening., Disp: 45 Tablet, Rfl: 3    atorvastatin (LIPITOR) 40 MG Tab, TAKE 1 TABLET BY MOUTH EVERY DAY IN THE EVENING, Disp: 90 Tablet, Rfl: 1    sildenafil citrate (VIAGRA) 50 MG tablet, Take 1 Tablet by mouth 1 time a day as needed for Erectile Dysfunction., Disp: 10 Tablet, Rfl: 3    amLODIPine (NORVASC) 10 MG Tab, TAKE 1 TABLET DAILY, Disp: 90 Tablet, Rfl: 3    hydroCHLOROthiazide (HYDRODIURIL) 25 MG Tab, Take 1 Tablet by mouth every day., Disp: 90 Tablet, Rfl: 3    acetaminophen (TYLENOL) 500 MG Tab, Take 1,000 mg by mouth every 6 hours as needed., Disp: , Rfl:     lisinopril (PRINIVIL) 40 MG tablet, Take 1 Tablet by mouth every day., Disp: 90 Tablet, Rfl: 3    nitroglycerin (NITROSTAT) 0.4 MG SL Tab, Place 1 Tablet under the tongue as needed for Chest Pain., Disp: 25 Tablet, Rfl: 1    fluticasone (FLONASE) 50 MCG/ACT nasal spray, Administer 1 Spray into affected nostril(S) every day., Disp: , Rfl:     MULTIPLE VITAMIN PO, Take 1 Tab by mouth every day., Disp: , Rfl:     Multiple Vitamins-Minerals (MENS MULTI VITAMIN & MINERAL PO), Take  by mouth. (Patient not taking: Reported on 6/5/2023), Disp: , Rfl:     Semaglutide,0.25 or 0.5MG/DOS, 2 MG/3ML Solution Pen-injector, Inject  0.25 mg under the skin every 7 days., Disp: 3 mL, Rfl: 6    Lactobacillus Rhamnosus, GG, (CULTUREE IMMUNITY SUPPORT PO), Take 1 Tablet by mouth every day., Disp: , Rfl:     Patient was instructed on the use of medications, either prescriptions or OTC and informed on when the appropriate follow up time period should be. In addition, patient was also instructed that should any acute worsening occur that they should notify this clinic asap or call 911.        Services suggested: No services needed at this time  Health Care Screening recommendations as per orders if indicated.  Referrals offered: PT/OT/Nutrition counseling/Behavioral Health/Smoking cessation as per orders if indicated.    Discussion today about general wellness and lifestyle habits:    Prevent falls and reduce trip hazards; Cautioned about securing or removing rugs.  Have a working fire alarm and carbon monoxide detector;   Engage in regular physical activity and social activities     Follow-up: No follow-ups on file.

## 2023-06-23 ENCOUNTER — PATIENT MESSAGE (OUTPATIENT)
Dept: CARDIOLOGY | Facility: MEDICAL CENTER | Age: 68
End: 2023-06-23
Payer: MEDICARE

## 2023-07-12 ENCOUNTER — PATIENT MESSAGE (OUTPATIENT)
Dept: CARDIOLOGY | Facility: MEDICAL CENTER | Age: 68
End: 2023-07-12
Payer: MEDICARE

## 2023-08-09 ENCOUNTER — PATIENT MESSAGE (OUTPATIENT)
Dept: CARDIOLOGY | Facility: MEDICAL CENTER | Age: 68
End: 2023-08-09
Payer: MEDICARE

## 2023-08-09 DIAGNOSIS — Z12.5 ENCOUNTER FOR SCREENING FOR MALIGNANT NEOPLASM OF PROSTATE: ICD-10-CM

## 2023-08-09 DIAGNOSIS — E66.01 MORBID OBESITY WITH BMI OF 40.0-44.9, ADULT (HCC): ICD-10-CM

## 2023-08-28 ENCOUNTER — OFFICE VISIT (OUTPATIENT)
Dept: MEDICAL GROUP | Facility: PHYSICIAN GROUP | Age: 68
End: 2023-08-28
Payer: MEDICARE

## 2023-08-28 VITALS
RESPIRATION RATE: 16 BRPM | HEIGHT: 72 IN | TEMPERATURE: 97.4 F | HEART RATE: 62 BPM | OXYGEN SATURATION: 94 % | BODY MASS INDEX: 42.66 KG/M2 | WEIGHT: 315 LBS | DIASTOLIC BLOOD PRESSURE: 70 MMHG | SYSTOLIC BLOOD PRESSURE: 128 MMHG

## 2023-08-28 DIAGNOSIS — M62.838 MUSCLE SPASM: ICD-10-CM

## 2023-08-28 DIAGNOSIS — R79.89 LOW TESTOSTERONE: ICD-10-CM

## 2023-08-28 PROCEDURE — 3078F DIAST BP <80 MM HG: CPT | Performed by: FAMILY MEDICINE

## 2023-08-28 PROCEDURE — 3074F SYST BP LT 130 MM HG: CPT | Performed by: FAMILY MEDICINE

## 2023-08-28 PROCEDURE — 99214 OFFICE O/P EST MOD 30 MIN: CPT | Performed by: FAMILY MEDICINE

## 2023-08-28 RX ORDER — CYCLOBENZAPRINE HCL 5 MG
5 TABLET ORAL 3 TIMES DAILY PRN
Qty: 30 TABLET | Refills: 2 | Status: SHIPPED | OUTPATIENT
Start: 2023-08-28 | End: 2024-02-08

## 2023-08-28 RX ORDER — TESTOSTERONE CYPIONATE 200 MG/ML
200 INJECTION, SOLUTION INTRAMUSCULAR
Qty: 2 ML | Refills: 3 | Status: SHIPPED | OUTPATIENT
Start: 2023-08-28 | End: 2023-12-29

## 2023-08-28 ASSESSMENT — ENCOUNTER SYMPTOMS
HEADACHES: 0
PALPITATIONS: 0
RESPIRATORY NEGATIVE: 1
MYALGIAS: 0
DEPRESSION: 0
CHILLS: 0
COUGH: 0
MUSCULOSKELETAL NEGATIVE: 1
CARDIOVASCULAR NEGATIVE: 1
DIZZINESS: 0
EYES NEGATIVE: 1
NEUROLOGICAL NEGATIVE: 1
NAUSEA: 0
BLURRED VISION: 0
TINGLING: 0
HEARTBURN: 0
BRUISES/BLEEDS EASILY: 0
FEVER: 0
GASTROINTESTINAL NEGATIVE: 1
DOUBLE VISION: 0
HEMOPTYSIS: 0
PSYCHIATRIC NEGATIVE: 1

## 2023-08-28 ASSESSMENT — FIBROSIS 4 INDEX: FIB4 SCORE: 1.23

## 2023-08-28 NOTE — PROGRESS NOTES
Subjective     Car Coyle is a 68 y.o. male who presents with Lab Results            Low libido and energy , low t on labs would like to try replacement. Advised of side effects on liver, chol and prostate.  1. Low testosterone     testosterone cypionate (DEPO-TESTOSTERONE) 200 MG/ML Solution injection; Inject 1 mL into the shoulder, thigh, or buttocks every 14 days for 112 days.  Dispense: 2 mL; Refill: 3    2. Muscle spasm     cyclobenzaprine (FLEXERIL) 5 mg tablet; Take 1 Tablet by mouth 3 times a day as needed for Muscle Spasms.  Dispense: 30 Tablet; Refill: 2    Past Medical History:  06/13/2012: HDL deficiency  No date: High cholesterol  06/13/2012: HTN (hypertension)  06/13/2012: Hyperlipidemia  06/13/2012: Hypertriglyceridemia  06/27/2012: Hypotestosteronism  No date: Seasonal allergies      Comment:  Inhaler us as needed.  10/17/2014: Sleep disorder breathing      Comment:  Oct 2014. Abnormal overnight oximetry. 136 desats.    No date: Snoring  06/27/2012: Vitamin d deficiency  Past Surgical History:  2005: KNEE ARTHROSCOPY      Comment:  right knee  No date: EYE SURGERY      Comment:  cataract surgery   1970s: INCISION HERNIA REPAIR  No date: TONSILLECTOMY  Social History    Tobacco Use      Smoking status: Never      Smokeless tobacco: Never    Vaping Use      Vaping Use: Never used    Alcohol use: Yes      Comment: 3-4 times a week, 1-2 drinks.    Drug use: No    Review of patient's family history indicates:  Problem: Heart Failure      Relation: Mother          Age of Onset: (Not Specified)  Problem: Genetic Disorder      Relation: Father          Age of Onset: (Not Specified)          Comment: alzheimer's  Problem: Hypertension      Relation: Sister          Age of Onset: (Not Specified)  Problem: Hypertension      Relation: Sister          Age of Onset: (Not Specified)  Problem: Diabetes      Relation: Sister          Age of Onset: (Not Specified)  Problem: Cancer      Relation: Maternal  Aunt          Age of Onset: (Not Specified)          Comment: breast cancer      Current Outpatient Medications: ·  testosterone cypionate (DEPO-TESTOSTERONE) 200 MG/ML Solution injection, Inject 1 mL into the shoulder, thigh, or buttocks every 14 days for 112 days., Disp: 2 mL, Rfl: 3·  cyclobenzaprine (FLEXERIL) 5 mg tablet, Take 1 Tablet by mouth 3 times a day as needed for Muscle Spasms., Disp: 30 Tablet, Rfl: 2·  albuterol 108 (90 Base) MCG/ACT Aero Soln inhalation aerosol, Inhale 1-2 Puffs every four hours as needed for Shortness of Breath., Disp: 18 g, Rfl: 3·  metoprolol SR (TOPROL XL) 25 MG TABLET SR 24 HR, Take 0.5 Tablets by mouth every evening., Disp: 45 Tablet, Rfl: 3·  atorvastatin (LIPITOR) 40 MG Tab, TAKE 1 TABLET BY MOUTH EVERY DAY IN THE EVENING, Disp: 90 Tablet, Rfl: 1·  sildenafil citrate (VIAGRA) 50 MG tablet, Take 1 Tablet by mouth 1 time a day as needed for Erectile Dysfunction., Disp: 10 Tablet, Rfl: 3·  amLODIPine (NORVASC) 10 MG Tab, TAKE 1 TABLET DAILY, Disp: 90 Tablet, Rfl: 3·  hydroCHLOROthiazide (HYDRODIURIL) 25 MG Tab, Take 1 Tablet by mouth every day., Disp: 90 Tablet, Rfl: 3·  acetaminophen (TYLENOL) 500 MG Tab, Take 1,000 mg by mouth every 6 hours as needed., Disp: , Rfl: ·  Lactobacillus Rhamnosus, GG, (CULTURELLE IMMUNITY SUPPORT PO), Take 1 Tablet by mouth every day., Disp: , Rfl: ·  lisinopril (PRINIVIL) 40 MG tablet, Take 1 Tablet by mouth every day., Disp: 90 Tablet, Rfl: 3·  nitroglycerin (NITROSTAT) 0.4 MG SL Tab, Place 1 Tablet under the tongue as needed for Chest Pain., Disp: 25 Tablet, Rfl: 1·  fluticasone (FLONASE) 50 MCG/ACT nasal spray, Administer 1 Spray into affected nostril(S) every day., Disp: , Rfl: ·  MULTIPLE VITAMIN PO, Take 1 Tab by mouth every day., Disp: , Rfl: ·  Multiple Vitamins-Minerals (MENS MULTI VITAMIN & MINERAL PO), Take  by mouth. (Patient not taking: Reported on 6/5/2023), Disp: , Rfl: ·  Semaglutide,0.25 or 0.5MG/DOS, 2 MG/3ML Solution  Pen-injector, Inject 0.25 mg under the skin every 7 days., Disp: 3 mL, Rfl: 6    Patient was instructed on the use of medications, either prescriptions or OTC and informed on when the appropriate follow up time period should be. In addition, patient was also instructed that should any acute worsening occur that they should notify this clinic asap or call 911.              Review of Systems   Constitutional:  Positive for malaise/fatigue. Negative for chills and fever.   HENT: Negative.  Negative for hearing loss.    Eyes: Negative.  Negative for blurred vision and double vision.   Respiratory: Negative.  Negative for cough and hemoptysis.    Cardiovascular: Negative.  Negative for chest pain and palpitations.   Gastrointestinal: Negative.  Negative for heartburn and nausea.   Genitourinary: Negative.  Negative for dysuria.   Musculoskeletal: Negative.  Negative for myalgias.   Skin: Negative.  Negative for rash.   Neurological: Negative.  Negative for dizziness, tingling and headaches.   Endo/Heme/Allergies: Negative.  Does not bruise/bleed easily.   Psychiatric/Behavioral: Negative.  Negative for depression and suicidal ideas.    All other systems reviewed and are negative.             Objective     /70 (BP Location: Left arm, Patient Position: Sitting, BP Cuff Size: Large adult)   Pulse 62   Temp 36.3 °C (97.4 °F) (Temporal)   Resp 16   Ht 1.829 m (6')   Wt (!) 150 kg (331 lb)   SpO2 94%   BMI 44.89 kg/m²      Physical Exam  Vitals and nursing note reviewed.   Constitutional:       General: He is not in acute distress.     Appearance: He is well-developed. He is not diaphoretic.   HENT:      Head: Normocephalic and atraumatic.      Mouth/Throat:      Pharynx: No oropharyngeal exudate.   Eyes:      Pupils: Pupils are equal, round, and reactive to light.   Cardiovascular:      Rate and Rhythm: Normal rate and regular rhythm.      Heart sounds: Normal heart sounds. No murmur heard.     No friction rub.  No gallop.   Pulmonary:      Effort: Pulmonary effort is normal. No respiratory distress.      Breath sounds: Normal breath sounds. No wheezing or rales.   Chest:      Chest wall: No tenderness.   Neurological:      Mental Status: He is alert and oriented to person, place, and time.   Psychiatric:         Behavior: Behavior normal.         Thought Content: Thought content normal.         Judgment: Judgment normal.                             Assessment & Plan        1. Low testosterone    - testosterone cypionate (DEPO-TESTOSTERONE) 200 MG/ML Solution injection; Inject 1 mL into the shoulder, thigh, or buttocks every 14 days for 112 days.  Dispense: 2 mL; Refill: 3    2. Muscle spasm    - cyclobenzaprine (FLEXERIL) 5 mg tablet; Take 1 Tablet by mouth 3 times a day as needed for Muscle Spasms.  Dispense: 30 Tablet; Refill: 2

## 2023-10-08 DIAGNOSIS — I10 ESSENTIAL HYPERTENSION: ICD-10-CM

## 2023-10-08 DIAGNOSIS — I10 ESSENTIAL HYPERTENSION, BENIGN: ICD-10-CM

## 2023-10-09 RX ORDER — LISINOPRIL 40 MG/1
40 TABLET ORAL DAILY
Qty: 90 TABLET | Refills: 2 | Status: SHIPPED | OUTPATIENT
Start: 2023-10-09 | End: 2024-01-12 | Stop reason: SDUPTHER

## 2023-10-09 NOTE — TELEPHONE ENCOUNTER
Is the patient due for a refill? Yes    Was the patient seen the past year? Yes    Date of last office visit: 5/24/23    Does the patient have an upcoming appointment?  No       Provider to refill:AL    Does the patients insurance require a 100 day supply?  No

## 2023-10-16 DIAGNOSIS — R94.39 ABNORMAL CARDIOVASCULAR STRESS TEST: ICD-10-CM

## 2023-10-16 DIAGNOSIS — R07.89 OTHER CHEST PAIN: ICD-10-CM

## 2023-10-17 RX ORDER — NITROGLYCERIN 0.4 MG/1
0.4 TABLET SUBLINGUAL PRN
Qty: 25 TABLET | Refills: 1 | Status: SHIPPED | OUTPATIENT
Start: 2023-10-17

## 2023-10-26 DIAGNOSIS — R79.89 ELEVATED SERUM CREATININE: ICD-10-CM

## 2023-11-02 DIAGNOSIS — Z51.81 ENCOUNTER FOR MONITORING DIURETIC THERAPY: ICD-10-CM

## 2023-11-02 DIAGNOSIS — I10 ESSENTIAL HYPERTENSION, BENIGN: ICD-10-CM

## 2023-11-02 DIAGNOSIS — Z79.899 ENCOUNTER FOR MONITORING DIURETIC THERAPY: ICD-10-CM

## 2023-11-03 RX ORDER — HYDROCHLOROTHIAZIDE 25 MG/1
25 TABLET ORAL DAILY
Qty: 90 TABLET | Refills: 3 | Status: SHIPPED | OUTPATIENT
Start: 2023-11-03 | End: 2023-11-28 | Stop reason: SINTOL

## 2023-11-07 ENCOUNTER — TELEPHONE (OUTPATIENT)
Dept: CARDIOLOGY | Facility: MEDICAL CENTER | Age: 68
End: 2023-11-07
Payer: MEDICARE

## 2023-11-11 ENCOUNTER — TELEPHONE (OUTPATIENT)
Dept: CARDIOLOGY | Facility: MEDICAL CENTER | Age: 68
End: 2023-11-11
Payer: MEDICARE

## 2023-11-20 ENCOUNTER — PATIENT MESSAGE (OUTPATIENT)
Dept: CARDIOLOGY | Facility: MEDICAL CENTER | Age: 68
End: 2023-11-20
Payer: MEDICARE

## 2023-11-21 ENCOUNTER — PATIENT MESSAGE (OUTPATIENT)
Dept: CARDIOLOGY | Facility: MEDICAL CENTER | Age: 68
End: 2023-11-21
Payer: MEDICARE

## 2023-11-28 ENCOUNTER — PATIENT MESSAGE (OUTPATIENT)
Dept: CARDIOLOGY | Facility: MEDICAL CENTER | Age: 68
End: 2023-11-28
Payer: MEDICARE

## 2023-12-26 DIAGNOSIS — R79.89 LOW TESTOSTERONE: ICD-10-CM

## 2023-12-27 ENCOUNTER — PATIENT MESSAGE (OUTPATIENT)
Dept: CARDIOLOGY | Facility: MEDICAL CENTER | Age: 68
End: 2023-12-27
Payer: MEDICARE

## 2023-12-28 DIAGNOSIS — I10 ESSENTIAL HYPERTENSION, BENIGN: ICD-10-CM

## 2023-12-28 RX ORDER — INDAPAMIDE 1.25 MG/1
1.25 TABLET ORAL EVERY MORNING
Qty: 30 TABLET | Refills: 1 | Status: SHIPPED | OUTPATIENT
Start: 2023-12-28 | End: 2024-01-12 | Stop reason: SDUPTHER

## 2023-12-29 RX ORDER — TESTOSTERONE CYPIONATE 200 MG/ML
200 INJECTION, SOLUTION INTRAMUSCULAR
Qty: 2 ML | Refills: 2 | Status: SHIPPED | OUTPATIENT
Start: 2023-12-29 | End: 2024-01-15 | Stop reason: SDUPTHER

## 2024-01-12 ENCOUNTER — PATIENT MESSAGE (OUTPATIENT)
Dept: CARDIOLOGY | Facility: MEDICAL CENTER | Age: 69
End: 2024-01-12
Payer: MEDICARE

## 2024-01-12 DIAGNOSIS — R07.89 OTHER CHEST PAIN: ICD-10-CM

## 2024-01-12 DIAGNOSIS — R94.39 ABNORMAL CARDIOVASCULAR STRESS TEST: ICD-10-CM

## 2024-01-12 DIAGNOSIS — I10 ESSENTIAL HYPERTENSION, BENIGN: ICD-10-CM

## 2024-01-12 DIAGNOSIS — E66.01 MORBID OBESITY WITH BMI OF 40.0-44.9, ADULT (HCC): ICD-10-CM

## 2024-01-12 DIAGNOSIS — R73.03 PREDIABETES: ICD-10-CM

## 2024-01-12 DIAGNOSIS — E78.2 MIXED HYPERLIPIDEMIA: ICD-10-CM

## 2024-01-12 DIAGNOSIS — I10 ESSENTIAL HYPERTENSION: ICD-10-CM

## 2024-01-12 RX ORDER — METOPROLOL SUCCINATE 25 MG/1
12.5 TABLET, EXTENDED RELEASE ORAL EVERY EVENING
Qty: 90 TABLET | Refills: 1 | Status: SHIPPED | OUTPATIENT
Start: 2024-01-12

## 2024-01-12 RX ORDER — AMLODIPINE BESYLATE 10 MG/1
10 TABLET ORAL DAILY
Qty: 90 TABLET | Refills: 3 | Status: SHIPPED | OUTPATIENT
Start: 2024-01-12

## 2024-01-12 RX ORDER — ATORVASTATIN CALCIUM 40 MG/1
40 TABLET, FILM COATED ORAL EVERY EVENING
Qty: 90 TABLET | Refills: 3 | Status: SHIPPED | OUTPATIENT
Start: 2024-01-12 | End: 2024-01-29

## 2024-01-12 RX ORDER — LISINOPRIL 40 MG/1
40 TABLET ORAL DAILY
Qty: 90 TABLET | Refills: 3 | Status: SHIPPED | OUTPATIENT
Start: 2024-01-12

## 2024-01-12 RX ORDER — INDAPAMIDE 1.25 MG/1
1.25 TABLET ORAL EVERY MORNING
Qty: 90 TABLET | Refills: 3 | Status: SHIPPED | OUTPATIENT
Start: 2024-01-12 | End: 2024-01-25

## 2024-01-15 DIAGNOSIS — R79.89 LOW TESTOSTERONE: ICD-10-CM

## 2024-01-15 RX ORDER — TESTOSTERONE CYPIONATE 200 MG/ML
200 INJECTION, SOLUTION INTRAMUSCULAR
Qty: 2 ML | Refills: 2 | Status: SHIPPED | OUTPATIENT
Start: 2024-01-15 | End: 2024-04-08

## 2024-01-25 ENCOUNTER — PATIENT MESSAGE (OUTPATIENT)
Dept: CARDIOLOGY | Facility: MEDICAL CENTER | Age: 69
End: 2024-01-25
Payer: MEDICARE

## 2024-01-25 DIAGNOSIS — I10 ESSENTIAL HYPERTENSION, BENIGN: ICD-10-CM

## 2024-01-25 RX ORDER — INDAPAMIDE 2.5 MG/1
2.5 TABLET ORAL EVERY MORNING
Qty: 90 TABLET | Refills: 3 | Status: SHIPPED | OUTPATIENT
Start: 2024-01-25

## 2024-01-27 DIAGNOSIS — I10 ESSENTIAL HYPERTENSION: ICD-10-CM

## 2024-01-27 DIAGNOSIS — E78.2 MIXED HYPERLIPIDEMIA: ICD-10-CM

## 2024-01-27 DIAGNOSIS — E66.01 MORBID OBESITY WITH BMI OF 40.0-44.9, ADULT (HCC): ICD-10-CM

## 2024-01-27 DIAGNOSIS — R73.03 PREDIABETES: ICD-10-CM

## 2024-01-29 RX ORDER — ATORVASTATIN CALCIUM 40 MG/1
40 TABLET, FILM COATED ORAL EVERY EVENING
Qty: 90 TABLET | Refills: 1 | Status: SHIPPED | OUTPATIENT
Start: 2024-01-29

## 2024-02-07 ENCOUNTER — OFFICE VISIT (OUTPATIENT)
Dept: MEDICAL GROUP | Facility: PHYSICIAN GROUP | Age: 69
End: 2024-02-07
Payer: MEDICARE

## 2024-02-07 ENCOUNTER — APPOINTMENT (OUTPATIENT)
Dept: RADIOLOGY | Facility: IMAGING CENTER | Age: 69
End: 2024-02-07
Attending: FAMILY MEDICINE
Payer: MEDICARE

## 2024-02-07 VITALS
DIASTOLIC BLOOD PRESSURE: 78 MMHG | TEMPERATURE: 97.9 F | RESPIRATION RATE: 20 BRPM | WEIGHT: 315 LBS | OXYGEN SATURATION: 93 % | HEART RATE: 75 BPM | BODY MASS INDEX: 42.66 KG/M2 | HEIGHT: 72 IN | SYSTOLIC BLOOD PRESSURE: 132 MMHG

## 2024-02-07 DIAGNOSIS — I10 ESSENTIAL HYPERTENSION: ICD-10-CM

## 2024-02-07 DIAGNOSIS — R07.89 CHEST WALL PAIN: ICD-10-CM

## 2024-02-07 DIAGNOSIS — E66.01 MORBID (SEVERE) OBESITY DUE TO EXCESS CALORIES (HCC): ICD-10-CM

## 2024-02-07 PROCEDURE — 71046 X-RAY EXAM CHEST 2 VIEWS: CPT | Mod: TC | Performed by: FAMILY MEDICINE

## 2024-02-07 PROCEDURE — 93000 ELECTROCARDIOGRAM COMPLETE: CPT | Performed by: FAMILY MEDICINE

## 2024-02-07 PROCEDURE — 3078F DIAST BP <80 MM HG: CPT | Performed by: FAMILY MEDICINE

## 2024-02-07 PROCEDURE — 99214 OFFICE O/P EST MOD 30 MIN: CPT | Mod: 25 | Performed by: FAMILY MEDICINE

## 2024-02-07 PROCEDURE — 3075F SYST BP GE 130 - 139MM HG: CPT | Performed by: FAMILY MEDICINE

## 2024-02-07 ASSESSMENT — ENCOUNTER SYMPTOMS
NEUROLOGICAL NEGATIVE: 1
GASTROINTESTINAL NEGATIVE: 1
TINGLING: 0
HEARTBURN: 0
PALPITATIONS: 0
FEVER: 0
MUSCULOSKELETAL NEGATIVE: 1
HEMOPTYSIS: 0
DEPRESSION: 0
BRUISES/BLEEDS EASILY: 0
EYES NEGATIVE: 1
DIZZINESS: 0
NAUSEA: 0
BLURRED VISION: 0
COUGH: 0
CHILLS: 0
RESPIRATORY NEGATIVE: 1
HEADACHES: 0
MYALGIAS: 0
PSYCHIATRIC NEGATIVE: 1
DOUBLE VISION: 0
CONSTITUTIONAL NEGATIVE: 1

## 2024-02-07 ASSESSMENT — FIBROSIS 4 INDEX: FIB4 SCORE: 1.23

## 2024-02-07 ASSESSMENT — PATIENT HEALTH QUESTIONNAIRE - PHQ9: CLINICAL INTERPRETATION OF PHQ2 SCORE: 0

## 2024-02-07 NOTE — PROGRESS NOTES
Subjective     Car Coyle is a 68 y.o. male who presents with Chest Pain (X yesterday intermittent chest pain on L side comes and goes, )            1. Chest wall pain  Left lateral chest wall pain after playing pickle ball yesterday. Pain with movement and deep breathing and on palpation of chest wall. EKG unremarkable  Will get cxr and f/u   DX-CHEST-2 VIEWS; Future    2. Essential hypertension  Currently treated for HTN, taking meds with no CP or sob, monitors bp at home periodically. controlled        3. Morbid (severe) obesity due to excess calories (HCC)  Patient has a diagnosis of obesity. They were counseled today on increasing exercise and  extensively counseled on a low carb diet         4. 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  10/17/2014: Sleep disorder breathing      Comment:  Oct 2014. Abnormal overnight oximetry. 136 desats.    No date: Snoring  06/27/2012: Vitamin d deficiency  Past Surgical History:  2005: KNEE ARTHROSCOPY      Comment:  right knee  No date: EYE SURGERY      Comment:  cataract surgery   1970s: INCISION HERNIA REPAIR  No date: TONSILLECTOMY  Social History    Tobacco Use      Smoking status: Never      Smokeless tobacco: Never    Vaping Use      Vaping Use: Never used    Alcohol use: Yes      Comment: 3-4 times a week, 1-2 drinks.    Drug use: No    Review of patient's family history indicates:  Problem: Heart Failure      Relation: Mother          Age of Onset: (Not Specified)  Problem: Genetic Disorder      Relation: Father          Age of Onset: (Not Specified)          Comment: alzheimer's  Problem: Hypertension      Relation: Sister          Age of Onset: (Not Specified)  Problem:  Hypertension      Relation: Sister          Age of Onset: (Not Specified)  Problem: Diabetes      Relation: Sister          Age of Onset: (Not Specified)  Problem: Cancer      Relation: Maternal Aunt          Age of Onset: (Not Specified)          Comment: breast cancer      Current Outpatient Medications: ·  atorvastatin (LIPITOR) 40 MG Tab, TAKE 1 TABLET BY MOUTH EVERY DAY IN THE EVENING, Disp: 90 Tablet, Rfl: 1·  indapamide (LOZOL) 2.5 MG Tab, Take 1 Tablet by mouth every morning., Disp: 90 Tablet, Rfl: 3·  testosterone cypionate (DEPO-TESTOSTERONE) 200 MG/ML injection, Inject 1 mL into the shoulder, thigh, or buttocks every 14 days for 84 days., Disp: 2 mL, Rfl: 2·  metoprolol SR (TOPROL XL) 25 MG TABLET SR 24 HR, Take 0.5 Tablets by mouth every evening., Disp: 90 Tablet, Rfl: 1·  lisinopril (PRINIVIL) 40 MG tablet, Take 1 Tablet by mouth every day., Disp: 90 Tablet, Rfl: 3·  amLODIPine (NORVASC) 10 MG Tab, Take 1 Tablet by mouth every day., Disp: 90 Tablet, Rfl: 3·  nitroglycerin (NITROSTAT) 0.4 MG SL Tab, PLACE 1 TABLET UNDER THE TONGUE AS NEEDED FOR CHEST PAIN., Disp: 25 Tablet, Rfl: 1·  cyclobenzaprine (FLEXERIL) 5 mg tablet, Take 1 Tablet by mouth 3 times a day as needed for Muscle Spasms., Disp: 30 Tablet, Rfl: 2·  albuterol 108 (90 Base) MCG/ACT Aero Soln inhalation aerosol, Inhale 1-2 Puffs every four hours as needed for Shortness of Breath., Disp: 18 g, Rfl: 3·  Multiple Vitamins-Minerals (MENS MULTI VITAMIN & MINERAL PO), Take  by mouth., Disp: , Rfl: ·  sildenafil citrate (VIAGRA) 50 MG tablet, Take 1 Tablet by mouth 1 time a day as needed for Erectile Dysfunction., Disp: 10 Tablet, Rfl: 3·  acetaminophen (TYLENOL) 500 MG Tab, Take 1,000 mg by mouth every 6 hours as needed., Disp: , Rfl: ·  Lactobacillus Rhamnosus, GG, (CULTURELLE IMMUNITY SUPPORT PO), Take 1 Tablet by mouth every day., Disp: , Rfl: ·  fluticasone (FLONASE) 50 MCG/ACT nasal spray, Administer 1 Spray into affected nostril(S) every  day., Disp: , Rfl: ·  MULTIPLE VITAMIN PO, Take 1 Tab by mouth every day., Disp: , Rfl:     Patient was instructed on the use of medications, either prescriptions or OTC and informed on when the appropriate follow up time period should be. In addition, patient was also instructed that should any acute worsening occur that they should notify this clinic asap or call 911.              Review of Systems   Constitutional: Negative.  Negative for chills and fever.   HENT: Negative.  Negative for hearing loss.    Eyes: Negative.  Negative for blurred vision and double vision.   Respiratory: Negative.  Negative for cough and hemoptysis.    Cardiovascular:  Positive for chest pain. Negative for palpitations.   Gastrointestinal: Negative.  Negative for heartburn and nausea.   Genitourinary: Negative.  Negative for dysuria.   Musculoskeletal: Negative.  Negative for myalgias.   Skin: Negative.  Negative for rash.   Neurological: Negative.  Negative for dizziness, tingling and headaches.   Endo/Heme/Allergies: Negative.  Does not bruise/bleed easily.   Psychiatric/Behavioral: Negative.  Negative for depression and suicidal ideas.    All other systems reviewed and are negative.             Objective     /78 (BP Location: Left arm, Patient Position: Sitting, BP Cuff Size: Large adult)   Pulse 75   Temp 36.6 °C (97.9 °F) (Temporal)   Resp 20   Ht 1.829 m (6')   Wt (!) 152 kg (335 lb 9.6 oz)   SpO2 93%   BMI 45.52 kg/m²      Physical Exam  Vitals and nursing note reviewed.   Constitutional:       General: He is not in acute distress.     Appearance: He is well-developed. He is not diaphoretic.   HENT:      Head: Normocephalic and atraumatic.      Mouth/Throat:      Pharynx: No oropharyngeal exudate.   Eyes:      Pupils: Pupils are equal, round, and reactive to light.   Cardiovascular:      Rate and Rhythm: Normal rate and regular rhythm.      Heart sounds: Normal heart sounds. No murmur heard.     No friction rub. No  gallop.   Pulmonary:      Effort: Pulmonary effort is normal. No respiratory distress.      Breath sounds: Normal breath sounds. No wheezing or rales.   Chest:      Chest wall: Tenderness present.       Neurological:      Mental Status: He is alert and oriented to person, place, and time.   Psychiatric:         Behavior: Behavior normal.         Thought Content: Thought content normal.         Judgment: Judgment normal.                             Assessment & Plan        1. Chest wall pain    - DX-CHEST-2 VIEWS; Future    2. Essential hypertension      3. Morbid (severe) obesity due to excess calories (HCC)      4. Body mass index (BMI) 45.0-49.9, adult (HCC)

## 2024-02-08 ENCOUNTER — OFFICE VISIT (OUTPATIENT)
Dept: URGENT CARE | Facility: CLINIC | Age: 69
End: 2024-02-08
Payer: MEDICARE

## 2024-02-08 VITALS
TEMPERATURE: 97.9 F | DIASTOLIC BLOOD PRESSURE: 94 MMHG | OXYGEN SATURATION: 98 % | SYSTOLIC BLOOD PRESSURE: 146 MMHG | HEIGHT: 72 IN | BODY MASS INDEX: 42.66 KG/M2 | HEART RATE: 108 BPM | WEIGHT: 315 LBS | RESPIRATION RATE: 18 BRPM

## 2024-02-08 DIAGNOSIS — U07.1 COVID-19: ICD-10-CM

## 2024-02-08 PROCEDURE — 3080F DIAST BP >= 90 MM HG: CPT | Performed by: NURSE PRACTITIONER

## 2024-02-08 PROCEDURE — 3077F SYST BP >= 140 MM HG: CPT | Performed by: NURSE PRACTITIONER

## 2024-02-08 PROCEDURE — 99214 OFFICE O/P EST MOD 30 MIN: CPT | Performed by: NURSE PRACTITIONER

## 2024-02-08 ASSESSMENT — ENCOUNTER SYMPTOMS
COUGH: 1
CONSTITUTIONAL NEGATIVE: 1
CHILLS: 0
FEVER: 0
CARDIOVASCULAR NEGATIVE: 1
SHORTNESS OF BREATH: 0

## 2024-02-08 ASSESSMENT — FIBROSIS 4 INDEX: FIB4 SCORE: 1.23

## 2024-02-08 ASSESSMENT — VISUAL ACUITY: OU: 1

## 2024-02-08 NOTE — PROGRESS NOTES
Subjective:     Car Coyle is a 68 y.o. male who presents for Coronavirus Screening (P.t tested positive for covid today)       URI   This is a new problem. The current episode started yesterday. The problem has been gradually worsening. Associated symptoms include congestion and coughing. Pertinent negatives include no chest pain.     Patient did home Covid test today which was positive. Wife had Covid about 1 week ago.    Review of Systems   Constitutional: Negative.  Negative for chills, fever and malaise/fatigue.   HENT:  Positive for congestion.    Respiratory:  Positive for cough. Negative for shortness of breath.    Cardiovascular: Negative.  Negative for chest pain.   All other systems reviewed and are negative.    Refer to HPI for additional details.    During this visit, appropriate PPE was worn, and hand hygiene was performed.    PMH:  has a past medical history of HDL deficiency (06/13/2012), High cholesterol, HTN (hypertension) (06/13/2012), Hyperlipidemia (06/13/2012), Hypertriglyceridemia (06/13/2012), Hypotestosteronism (06/27/2012), Seasonal allergies, Sleep disorder breathing (10/17/2014), Snoring, and Vitamin d deficiency (06/27/2012).    MEDS:   Current Outpatient Medications:     molnupiravir 200 MG capsule, Take 4 Capsules by mouth 2 times a day for 5 days., Disp: 40 Capsule, Rfl: 0    atorvastatin (LIPITOR) 40 MG Tab, TAKE 1 TABLET BY MOUTH EVERY DAY IN THE EVENING, Disp: 90 Tablet, Rfl: 1    testosterone cypionate (DEPO-TESTOSTERONE) 200 MG/ML injection, Inject 1 mL into the shoulder, thigh, or buttocks every 14 days for 84 days., Disp: 2 mL, Rfl: 2    metoprolol SR (TOPROL XL) 25 MG TABLET SR 24 HR, Take 0.5 Tablets by mouth every evening., Disp: 90 Tablet, Rfl: 1    lisinopril (PRINIVIL) 40 MG tablet, Take 1 Tablet by mouth every day., Disp: 90 Tablet, Rfl: 3    amLODIPine (NORVASC) 10 MG Tab, Take 1 Tablet by mouth every day., Disp: 90 Tablet, Rfl: 3    nitroglycerin (NITROSTAT)  0.4 MG SL Tab, PLACE 1 TABLET UNDER THE TONGUE AS NEEDED FOR CHEST PAIN., Disp: 25 Tablet, Rfl: 1    albuterol 108 (90 Base) MCG/ACT Aero Soln inhalation aerosol, Inhale 1-2 Puffs every four hours as needed for Shortness of Breath., Disp: 18 g, Rfl: 3    Multiple Vitamins-Minerals (MENS MULTI VITAMIN & MINERAL PO), Take  by mouth., Disp: , Rfl:     acetaminophen (TYLENOL) 500 MG Tab, Take 1,000 mg by mouth every 6 hours as needed., Disp: , Rfl:     Lactobacillus Rhamnosus, GG, (CULTURELLE IMMUNITY SUPPORT PO), Take 1 Tablet by mouth every day., Disp: , Rfl:     fluticasone (FLONASE) 50 MCG/ACT nasal spray, Administer 1 Spray into affected nostril(S) every day., Disp: , Rfl:     MULTIPLE VITAMIN PO, Take 1 Tab by mouth every day., Disp: , Rfl:     indapamide (LOZOL) 2.5 MG Tab, Take 1 Tablet by mouth every morning., Disp: 90 Tablet, Rfl: 3    ALLERGIES:   Allergies   Allergen Reactions    Hydrochlorothiazide Unspecified     Acute kidney injury     SURGHX:   Past Surgical History:   Procedure Laterality Date    KNEE ARTHROSCOPY  2005    right knee    EYE SURGERY      cataract surgery     INCISION HERNIA REPAIR  1970s    TONSILLECTOMY       SOCHX:  reports that he has never smoked. He has never used smokeless tobacco. He reports current alcohol use. He reports that he does not use drugs.    FH: Per HPI as applicable/pertinent.      Objective:     BP (!) 146/94   Pulse (!) 108   Temp 36.6 °C (97.9 °F) (Temporal)   Resp 18   Ht 1.829 m (6')   Wt (!) 152 kg (335 lb)   SpO2 98%   BMI 45.43 kg/m²     Physical Exam  Nursing note reviewed.   Constitutional:       General: He is not in acute distress.     Appearance: He is well-developed. He is not ill-appearing or toxic-appearing.   HENT:      Nose: Congestion present.      Mouth/Throat:      Mouth: Mucous membranes are moist.      Pharynx: Oropharynx is clear.   Eyes:      General: Vision grossly intact.      Extraocular Movements: Extraocular movements intact.       Conjunctiva/sclera: Conjunctivae normal.   Neck:      Trachea: Phonation normal.   Cardiovascular:      Rate and Rhythm: Regular rhythm. Tachycardia present.      Heart sounds: Normal heart sounds.   Pulmonary:      Effort: Pulmonary effort is normal. No respiratory distress.      Breath sounds: Normal breath sounds. No stridor. No decreased breath sounds, wheezing, rhonchi or rales.   Musculoskeletal:         General: No deformity. Normal range of motion.      Cervical back: Normal range of motion.   Skin:     General: Skin is warm and dry.      Coloration: Skin is not pale.   Neurological:      Mental Status: He is alert and oriented to person, place, and time.      Motor: No weakness.   Psychiatric:         Behavior: Behavior normal. Behavior is cooperative.       Assessment/Plan:     1. COVID-19  - molnupiravir 200 MG capsule; Take 4 Capsules by mouth 2 times a day for 5 days.  Dispense: 40 Capsule; Refill: 0    Discussed likely self-limiting viral etiology and expected course and duration of illness.    Risks/benefits of antivirals reviewed. Patient does have risk factors of HTN and hyperlipidemia. Cannot use Paxlovid due to use of amlodipine, atorvastatin, and indapamide. Patient amenable to proceed with Lagevrio. Checklist reviewed. Medication list reviewed. [facility] 11/2/2022 reviewed. No contraindications to Lagevrio treatment identified. Rx sent electronically to pharmacy.    Patient is advised to self-isolate and mask as recommended by the CDC.    Children and adults with mild, symptomatic COVID-19: Isolation can end at least 5 days after symptom onset and after fever ends for 24 hours (without the use of fever-reducing medication) and symptoms are improving, if these people can continue to properly wear a well-fitted mask around others for 5 more days after the 5-day isolation period. Day 0 is the first day of symptoms.  Available data suggest that patients with mild-to-moderate COVID-19 remain infectious no  longer than 10 days after symptom onset.     Please visit https://www.cdc.gov/coronavirus/2019-ncov/hcp/duration-isolation.html for further information.     Emphasize supportive measures, rest, fluids, and OTC medication PRN.    Monitor. Return precautions advised.      Rx as above sent electronically.     Differential diagnosis, natural history, supportive care, over-the-counter symptom management per 's instructions, close monitoring, and indications for immediate follow-up discussed.     All questions answered. Patient/wife agrees with the plan of care.    Discharge summary provided via [facility].     Billing note: 30 minutes was allotted and spent for patient care and coordination of care (not reported separately) including preparing for the visit, obtaining/reviewing history from/with patient/wife, performing an exam/evaluation, ordering Rx, calling pharmacies for availability, developing a plan of care, counseling/educating the patient/wife, developing the discharge summary for release to [facility], updating the medical record, and documentation. This template was updated to summarize care specific to this encounter. Established patient. [id number]. Please refer to the chart for additional details on the care provided.

## 2024-02-08 NOTE — PATIENT INSTRUCTIONS
Patient is advised to self-isolate and mask as recommended by the CDC.    Children and adults with mild, symptomatic COVID-19: Isolation can end at least 5 days after symptom onset and after fever ends for 24 hours (without the use of fever-reducing medication) and symptoms are improving, if these people can continue to properly wear a well-fitted mask around others for 5 more days after the 5-day isolation period. Day 0 is the first day of symptoms.  Available data suggest that patients with mild-to-moderate COVID-19 remain infectious no longer than 10 days after symptom onset.     Please visit https://www.cdc.gov/coronavirus/2019-ncov/hcp/duration-isolation.html for further information.

## 2024-02-09 ENCOUNTER — TELEPHONE (OUTPATIENT)
Dept: URGENT CARE | Facility: PHYSICIAN GROUP | Age: 69
End: 2024-02-09
Payer: MEDICARE

## 2024-02-09 NOTE — ADDENDUM NOTE
Addended by: YOSELIN SANDOVAL on: 2/8/2024 04:25 PM     Modules accepted: Orders, Level of Service

## 2024-02-10 ENCOUNTER — TELEPHONE (OUTPATIENT)
Dept: URGENT CARE | Facility: CLINIC | Age: 69
End: 2024-02-10
Payer: MEDICARE

## 2024-02-10 DIAGNOSIS — U07.1 COVID-19: ICD-10-CM

## 2024-02-10 NOTE — TELEPHONE ENCOUNTER
Spoke with patient over the phone.  Advised that message was sent to cardiology yesterday in regards to Paxlovid and response has not yet been received.  At this time, patient reports symptoms have been stable.  Reports most bothersome symptom is nasal congestion.  Has been using over-the-counter DayQuil and Flonase.  Cautioned about decongestant content of the cold.  Suggest increasing Flonase to 1 spray in each nostril twice daily.  May use OTC antihistamine.  Patient using humidifier.  Reports he rechecked his blood pressure today and it was 146/66, and improvement from 146/94 in clinic yesterday.  Because of patient's cardiac history and history of high blood pressure, advised it would most likely be better to not alter his current blood pressure medications and therefore not pursue Paxlovid.  Patient called the [facility] pharmacy and alternative molnupiravir is ready for pickup should he wish to initiate it ($571 copay).  Advised patient that antiviral must be initiated in the first 5 days of symptoms.  As patient's symptoms of COVID-19 are relatively mild at this time, recommend continuing supportive measures and close monitoring.  Return precautions advised.  All questions answered.

## 2024-02-19 ENCOUNTER — OFFICE VISIT (OUTPATIENT)
Dept: URGENT CARE | Facility: CLINIC | Age: 69
End: 2024-02-19
Payer: MEDICARE

## 2024-02-19 VITALS
HEIGHT: 72 IN | SYSTOLIC BLOOD PRESSURE: 136 MMHG | OXYGEN SATURATION: 93 % | RESPIRATION RATE: 16 BRPM | DIASTOLIC BLOOD PRESSURE: 96 MMHG | TEMPERATURE: 98 F | BODY MASS INDEX: 42.66 KG/M2 | HEART RATE: 65 BPM | WEIGHT: 315 LBS

## 2024-02-19 DIAGNOSIS — J01.40 ACUTE NON-RECURRENT PANSINUSITIS: ICD-10-CM

## 2024-02-19 PROCEDURE — 3075F SYST BP GE 130 - 139MM HG: CPT | Performed by: PHYSICIAN ASSISTANT

## 2024-02-19 PROCEDURE — 3080F DIAST BP >= 90 MM HG: CPT | Performed by: PHYSICIAN ASSISTANT

## 2024-02-19 PROCEDURE — 99213 OFFICE O/P EST LOW 20 MIN: CPT | Performed by: PHYSICIAN ASSISTANT

## 2024-02-19 RX ORDER — AMOXICILLIN AND CLAVULANATE POTASSIUM 875; 125 MG/1; MG/1
1 TABLET, FILM COATED ORAL 2 TIMES DAILY
Qty: 14 TABLET | Refills: 0 | Status: SHIPPED | OUTPATIENT
Start: 2024-02-19 | End: 2024-02-19

## 2024-02-19 RX ORDER — AMOXICILLIN AND CLAVULANATE POTASSIUM 875; 125 MG/1; MG/1
1 TABLET, FILM COATED ORAL 2 TIMES DAILY
Qty: 14 TABLET | Refills: 0 | Status: SHIPPED
Start: 2024-02-19 | End: 2024-02-26

## 2024-02-19 ASSESSMENT — FIBROSIS 4 INDEX: FIB4 SCORE: 1.23

## 2024-02-19 ASSESSMENT — ENCOUNTER SYMPTOMS
FEVER: 0
CHILLS: 0
SINUS PRESSURE: 1
SINUS PAIN: 1

## 2024-02-19 NOTE — PROGRESS NOTES
Subjective:   Car Coyle is a 68 y.o. male who presents today with   Chief Complaint   Patient presents with    Sinus Problem     Sinus, pressure,  slight post nasal drip,x 1 month     Sinus Problem  This is a new problem. The current episode started 1 to 4 weeks ago. The problem has been gradually worsening since onset. There has been no fever. Associated symptoms include congestion and sinus pressure. Pertinent negatives include no chills.     Patient was initially seen for COVID in urgent care setting on February 8. Had sinus symptoms even prior to that. COVID symptoms resolved but still having sinus symptoms.    PMH:  has a past medical history of HDL deficiency (06/13/2012), High cholesterol, HTN (hypertension) (06/13/2012), Hyperlipidemia (06/13/2012), Hypertriglyceridemia (06/13/2012), Hypotestosteronism (06/27/2012), Seasonal allergies, Sleep disorder breathing (10/17/2014), Snoring, and Vitamin d deficiency (06/27/2012).  MEDS:   Current Outpatient Medications:     amoxicillin-clavulanate (AUGMENTIN) 875-125 MG Tab, Take 1 Tablet by mouth 2 times a day for 7 days., Disp: 14 Tablet, Rfl: 0    atorvastatin (LIPITOR) 40 MG Tab, TAKE 1 TABLET BY MOUTH EVERY DAY IN THE EVENING, Disp: 90 Tablet, Rfl: 1    indapamide (LOZOL) 2.5 MG Tab, Take 1 Tablet by mouth every morning., Disp: 90 Tablet, Rfl: 3    testosterone cypionate (DEPO-TESTOSTERONE) 200 MG/ML injection, Inject 1 mL into the shoulder, thigh, or buttocks every 14 days for 84 days., Disp: 2 mL, Rfl: 2    metoprolol SR (TOPROL XL) 25 MG TABLET SR 24 HR, Take 0.5 Tablets by mouth every evening., Disp: 90 Tablet, Rfl: 1    lisinopril (PRINIVIL) 40 MG tablet, Take 1 Tablet by mouth every day., Disp: 90 Tablet, Rfl: 3    amLODIPine (NORVASC) 10 MG Tab, Take 1 Tablet by mouth every day., Disp: 90 Tablet, Rfl: 3    nitroglycerin (NITROSTAT) 0.4 MG SL Tab, PLACE 1 TABLET UNDER THE TONGUE AS NEEDED FOR CHEST PAIN., Disp: 25 Tablet, Rfl: 1    albuterol  108 (90 Base) MCG/ACT Aero Soln inhalation aerosol, Inhale 1-2 Puffs every four hours as needed for Shortness of Breath., Disp: 18 g, Rfl: 3    Multiple Vitamins-Minerals (MENS MULTI VITAMIN & MINERAL PO), Take  by mouth., Disp: , Rfl:     acetaminophen (TYLENOL) 500 MG Tab, Take 1,000 mg by mouth every 6 hours as needed., Disp: , Rfl:     Lactobacillus Rhamnosus, GG, (CULTURELLE IMMUNITY SUPPORT PO), Take 1 Tablet by mouth every day., Disp: , Rfl:     fluticasone (FLONASE) 50 MCG/ACT nasal spray, Administer 1 Spray into affected nostril(S) every day., Disp: , Rfl:     MULTIPLE VITAMIN PO, Take 1 Tab by mouth every day., Disp: , Rfl:   ALLERGIES:   Allergies   Allergen Reactions    Hydrochlorothiazide Unspecified     Acute kidney injury     SURGHX:   Past Surgical History:   Procedure Laterality Date    KNEE ARTHROSCOPY  2005    right knee    EYE SURGERY      cataract surgery     INCISION HERNIA REPAIR  1970s    TONSILLECTOMY       SOCHX:  reports that he has never smoked. He has never used smokeless tobacco. He reports current alcohol use. He reports that he does not use drugs.  FH: Reviewed with patient, not pertinent to this visit.     Review of Systems   Constitutional:  Negative for chills and fever.   HENT:  Positive for congestion, sinus pressure and sinus pain.       Objective:   BP (!) 136/96   Pulse 65   Temp 36.7 °C (98 °F) (Temporal)   Resp 16   Ht 1.829 m (6')   Wt (!) 151 kg (333 lb)   SpO2 93%   BMI 45.16 kg/m²   Physical Exam  Vitals and nursing note reviewed.   Constitutional:       General: He is not in acute distress.     Appearance: Normal appearance. He is well-developed. He is not ill-appearing or toxic-appearing.   HENT:      Head: Normocephalic and atraumatic.      Right Ear: Hearing, tympanic membrane and ear canal normal.      Left Ear: Hearing, tympanic membrane and ear canal normal.      Nose: Mucosal edema and congestion present.   Cardiovascular:      Rate and Rhythm: Normal rate  and regular rhythm.      Heart sounds: Normal heart sounds.   Pulmonary:      Effort: Pulmonary effort is normal. No respiratory distress.      Breath sounds: Normal breath sounds. No stridor. No wheezing, rhonchi or rales.   Musculoskeletal:      Comments: Normal movement in all 4 extremities   Skin:     General: Skin is warm and dry.   Neurological:      Mental Status: He is alert.      Coordination: Coordination normal.   Psychiatric:         Mood and Affect: Mood normal.       Assessment/Plan:   Assessment    1. Acute non-recurrent pansinusitis  - amoxicillin-clavulanate (AUGMENTIN) 875-125 MG Tab; Take 1 Tablet by mouth 2 times a day for 7 days.  Dispense: 14 Tablet; Refill: 0    Symptoms and presentation appear consistent with sinus infection and given duration of his symptoms recommend treating with antibiotics to cover for likely bacterial etiology.  Recommend he continue with over-the-counter sinus rinse per 's instructions as well.     Differential diagnosis, natural history, supportive care, and indications for immediate follow-up discussed.   Patient given instructions and understanding of medications and treatment.    If not improving in 3-5 days, F/U with PCP or return to  if symptoms worsen.    Patient agreeable to plan.    Please note that this dictation was created using voice recognition software. I have made every reasonable attempt to correct obvious errors, but I expect that there are errors of grammar and possibly content that I did not discover before finalizing the note.    Medardo Tabares PA-C

## 2024-02-26 ENCOUNTER — PATIENT MESSAGE (OUTPATIENT)
Dept: CARDIOLOGY | Facility: MEDICAL CENTER | Age: 69
End: 2024-02-26
Payer: MEDICARE

## 2024-02-27 DIAGNOSIS — I10 ESSENTIAL HYPERTENSION, BENIGN: ICD-10-CM

## 2024-03-12 ENCOUNTER — PATIENT MESSAGE (OUTPATIENT)
Dept: CARDIOLOGY | Facility: MEDICAL CENTER | Age: 69
End: 2024-03-12
Payer: MEDICARE

## 2024-03-14 ENCOUNTER — PATIENT MESSAGE (OUTPATIENT)
Dept: CARDIOLOGY | Facility: MEDICAL CENTER | Age: 69
End: 2024-03-14
Payer: MEDICARE

## 2024-05-24 ENCOUNTER — OFFICE VISIT (OUTPATIENT)
Dept: CARDIOLOGY | Facility: MEDICAL CENTER | Age: 69
End: 2024-05-24
Attending: NURSE PRACTITIONER
Payer: MEDICARE

## 2024-05-24 VITALS
DIASTOLIC BLOOD PRESSURE: 68 MMHG | RESPIRATION RATE: 14 BRPM | WEIGHT: 315 LBS | BODY MASS INDEX: 42.66 KG/M2 | HEART RATE: 60 BPM | SYSTOLIC BLOOD PRESSURE: 138 MMHG | OXYGEN SATURATION: 94 % | HEIGHT: 72 IN

## 2024-05-24 DIAGNOSIS — R73.03 PREDIABETES: ICD-10-CM

## 2024-05-24 DIAGNOSIS — E66.01 CLASS 3 SEVERE OBESITY DUE TO EXCESS CALORIES WITHOUT SERIOUS COMORBIDITY WITH BODY MASS INDEX (BMI) OF 40.0 TO 44.9 IN ADULT (HCC): ICD-10-CM

## 2024-05-24 DIAGNOSIS — G47.30 SLEEP DISORDER BREATHING: ICD-10-CM

## 2024-05-24 DIAGNOSIS — I10 PRIMARY HYPERTENSION: ICD-10-CM

## 2024-05-24 DIAGNOSIS — Q24.5 CORONARY-MYOCARDIAL BRIDGE: ICD-10-CM

## 2024-05-24 RX ORDER — TESTOSTERONE CYPIONATE 200 MG/ML
INJECTION, SOLUTION INTRAMUSCULAR
COMMUNITY
Start: 2024-05-16

## 2024-05-24 RX ORDER — SEMAGLUTIDE 0.68 MG/ML
0.5 INJECTION, SOLUTION SUBCUTANEOUS
Qty: 3 ML | Refills: 11 | Status: SHIPPED | OUTPATIENT
Start: 2024-05-24

## 2024-05-24 ASSESSMENT — ENCOUNTER SYMPTOMS
DOUBLE VISION: 0
ORTHOPNEA: 0
WEAKNESS: 0
PALPITATIONS: 0
INSOMNIA: 1
COUGH: 0
HEADACHES: 0
BLURRED VISION: 0
FOCAL WEAKNESS: 0
FALLS: 0
WHEEZING: 0
LOSS OF CONSCIOUSNESS: 0
SHORTNESS OF BREATH: 0
DIZZINESS: 0

## 2024-05-24 ASSESSMENT — FIBROSIS 4 INDEX: FIB4 SCORE: 1.23

## 2024-05-24 NOTE — PROGRESS NOTES
Chief Complaint   Patient presents with    Follow-Up     Stable angina (HCC)        Hyperlipidemia    Hypertension       Subjective     aCr Coyle is a 68 y.o. male who presents today for follow up stable angina .    He is followed by Dr. Kwon in our clinic, history of stable angina LAD coronary myocardial bridge, obesity 2, hypertension, and hyperlipidemia.    Last seen  by Dr. Kwon 5/2023 for follow up. He was started on ozempic, but didn't like the side effects and stopped the medication.     Today,  He has been on low carb diet and has not lost any weight and would like to be back on ozempic. He has been playing his pickel ball due to knee and back pain lately.     Denies any chest pain/pressure, sob, dizziness or palpitations.     Past Medical History:   Diagnosis Date    HDL deficiency 06/13/2012    High cholesterol     HTN (hypertension) 06/13/2012    Hyperlipidemia 06/13/2012    Hypertriglyceridemia 06/13/2012    Hypotestosteronism 06/27/2012    Seasonal allergies     Inhaler us as needed.    Sleep disorder breathing 10/17/2014    Oct 2014. Abnormal overnight oximetry. 136 desats.      Snoring     Vitamin d deficiency 06/27/2012     Past Surgical History:   Procedure Laterality Date    KNEE ARTHROSCOPY  2005    right knee    EYE SURGERY      cataract surgery     INCISION HERNIA REPAIR  1970s    TONSILLECTOMY       Family History   Problem Relation Age of Onset    Heart Failure Mother     Genetic Disorder Father         alzheimer's    Hypertension Sister     Hypertension Sister     Diabetes Sister     Cancer Maternal Aunt         breast cancer     Social History     Socioeconomic History    Marital status:      Spouse name: Not on file    Number of children: 1    Years of education: Not on file    Highest education level: Some college, no degree   Occupational History    Occupation: Realtor Residential     Employer: unknown   Tobacco Use    Smoking status: Never    Smokeless tobacco:  Never   Vaping Use    Vaping status: Never Used   Substance and Sexual Activity    Alcohol use: Yes     Comment: 3-4 times a week, 1-2 drinks.    Drug use: No    Sexual activity: Yes     Partners: Female   Other Topics Concern    Not on file   Social History Narrative    Employed as Realtor. Plays [employer] twice per week.      Social Determinants of Health     Financial Resource Strain: Low Risk  (6/3/2023)    Overall Financial Resource Strain (CARDIA)     Difficulty of Paying Living Expenses: Not very hard   Food Insecurity: No Food Insecurity (6/3/2023)    Hunger Vital Sign     Worried About Running Out of Food in the Last Year: Never true     Ran Out of Food in the Last Year: Never true   Transportation Needs: No Transportation Needs (6/3/2023)    PRAPARE - Transportation     Lack of Transportation (Medical): No     Lack of Transportation (Non-Medical): No   Physical Activity: Sufficiently Active (6/3/2023)    Exercise Vital Sign     Days of Exercise per Week: 3 days     Minutes of Exercise per Session: 100 min   Stress: Stress Concern Present (6/3/2023)    [employer] [place] [employer] - Occupational Stress Questionnaire     Feeling of Stress : To some extent   Social Connections: Unknown (6/3/2023)    Social Connection and Isolation Panel [NHANES]     Frequency of Communication with Friends and Family: Twice a week     Frequency of Social Gatherings with Friends and Family: Three times a week     Attends Jainism Services: Patient declined     Active Member of Clubs or Organizations: No     Attends Club or Organization Meetings: Never     Marital Status:    Intimate Partner Violence: Not on file   Housing Stability: Low Risk  (6/3/2023)    Housing Stability Vital Sign     Unable to Pay for Housing in the Last Year: No     Number of Places Lived in the Last Year: 1     Unstable Housing in the Last Year: No     Allergies   Allergen Reactions    Hydrochlorothiazide Unspecified     Acute  kidney injury     Outpatient Encounter Medications as of 5/24/2024   Medication Sig Dispense Refill    testosterone cypionate (DEPO-TESTOSTERONE) 200 MG/ML injection INJECT 1 ML INTO THE SHOULDER, THIGH, OR BUTTOCKS EVERY 14 DAYS FOR 84 DAYS.      Semaglutide,0.25 or 0.5MG/DOS, (OZEMPIC, 0.25 OR 0.5 MG/DOSE,) 2 MG/3ML Solution Pen-injector Inject 0.5 mg under the skin every 7 days. 3 mL 11    atorvastatin (LIPITOR) 40 MG Tab TAKE 1 TABLET BY MOUTH EVERY DAY IN THE EVENING 90 Tablet 1    indapamide (LOZOL) 2.5 MG Tab Take 1 Tablet by mouth every morning. 90 Tablet 3    metoprolol SR (TOPROL XL) 25 MG TABLET SR 24 HR Take 0.5 Tablets by mouth every evening. 90 Tablet 1    lisinopril (PRINIVIL) 40 MG tablet Take 1 Tablet by mouth every day. 90 Tablet 3    amLODIPine (NORVASC) 10 MG Tab Take 1 Tablet by mouth every day. 90 Tablet 3    nitroglycerin (NITROSTAT) 0.4 MG SL Tab PLACE 1 TABLET UNDER THE TONGUE AS NEEDED FOR CHEST PAIN. 25 Tablet 1    albuterol 108 (90 Base) MCG/ACT Aero Soln inhalation aerosol Inhale 1-2 Puffs every four hours as needed for Shortness of Breath. 18 g 3    Multiple Vitamins-Minerals (MENS MULTI VITAMIN & MINERAL PO) Take  by mouth.      acetaminophen (TYLENOL) 500 MG Tab Take 1,000 mg by mouth every 6 hours as needed.      Lactobacillus Rhamnosus, GG, (CULTURELLE IMMUNITY SUPPORT PO) Take 1 Tablet by mouth every day.      fluticasone (FLONASE) 50 MCG/ACT nasal spray Administer 1 Spray into affected nostril(S) every day.      MULTIPLE VITAMIN PO Take 1 Tab by mouth every day.       No facility-administered encounter medications on file as of 5/24/2024.     Review of Systems   Constitutional:  Positive for malaise/fatigue.   Eyes:  Negative for blurred vision and double vision.   Respiratory:  Negative for cough, shortness of breath and wheezing.    Cardiovascular:  Negative for chest pain, palpitations, orthopnea and leg swelling.   Musculoskeletal:  Negative for falls.   Neurological:   Negative for dizziness, focal weakness, loss of consciousness, weakness and headaches.   Psychiatric/Behavioral:  The patient has insomnia.    All other systems reviewed and are negative.             Objective     /68 (BP Location: Left arm, Patient Position: Sitting, BP Cuff Size: Adult)   Pulse 60   Resp 14   Ht 1.829 m (6')   Wt (!) 153 kg (337 lb)   SpO2 94%   BMI 45.71 kg/m²     Physical Exam  Constitutional:       General: He is not in acute distress.     Appearance: He is well-developed. He is obese. He is not diaphoretic.   HENT:      Head: Normocephalic and atraumatic.   Eyes:      Pupils: Pupils are equal, round, and reactive to light.   Neck:      Vascular: No JVD.   Cardiovascular:      Rate and Rhythm: Normal rate and regular rhythm.      Heart sounds: Normal heart sounds.   Pulmonary:      Effort: Pulmonary effort is normal.      Breath sounds: Normal breath sounds.   Abdominal:      General: Bowel sounds are normal. There is no distension.      Palpations: Abdomen is soft.   Musculoskeletal:      Right lower leg: No edema.      Left lower leg: No edema.   Skin:     General: Skin is warm and dry.   Neurological:      Mental Status: He is alert and oriented to person, place, and time.   Psychiatric:         Behavior: Behavior normal.         Thought Content: Thought content normal.         Judgment: Judgment normal.              [facility] / IFR 11/2022  IMPRESSION:  Mid LAD myocardial bridge IFR 0.79  Nonobstructive CAD otherwise  Hypertension  Elevated resting LVEDP with no significant transaortic gradient on pullback  RECOMMENDATIONS:  TR band protocol  Increase metoprolol to succinate to 25 mg at nighttime  Stop aspirin  Advised patient to start taking his 40 mg of lisinopril instead of 30 mg and send me in a.m. and p.m. blood pressure log in 1 week to make further antihypertensive medication adjustments which will help optimize his LVEDP.  Continued efforts with eating heart healthy diet,  weight loss and incorporate more aerobic exercise.    Myocardial Perfusion  9/16/2022   NUCLEAR IMAGING INTERPRETATION   There is a large area of mild to moderate reversibility involving most of the    inferior and inferoseptal walls with extension into the anteroseptal wall and    apex suggestive of ischemia.   SDS = 8. Normal left ventricular systolic function.   Normal TID ratio.   ECG INTERPRETATION   Non-diagnostic due to use of pharmacological stress agent.     Assessment & Plan     1. Prediabetes  Referral to Pulmonary and Sleep Medicine    Semaglutide,0.25 or 0.5MG/DOS, (OZEMPIC, 0.25 OR 0.5 MG/DOSE,) 2 MG/3ML Solution Pen-injector      2. Sleep disorder breathing  Referral to Pulmonary and Sleep Medicine    Semaglutide,0.25 or 0.5MG/DOS, (OZEMPIC, 0.25 OR 0.5 MG/DOSE,) 2 MG/3ML Solution Pen-injector      3. Primary hypertension  Referral to Pulmonary and Sleep Medicine    Semaglutide,0.25 or 0.5MG/DOS, (OZEMPIC, 0.25 OR 0.5 MG/DOSE,) 2 MG/3ML Solution Pen-injector      4. Class 3 severe obesity due to excess calories without serious comorbidity with body mass index (BMI) of 40.0 to 44.9 in adult (HCC)  Semaglutide,0.25 or 0.5MG/DOS, (OZEMPIC, 0.25 OR 0.5 MG/DOSE,) 2 MG/3ML Solution Pen-injector      5. BMI 45.0-49.9, adult (HCC)  Semaglutide,0.25 or 0.5MG/DOS, (OZEMPIC, 0.25 OR 0.5 MG/DOSE,) 2 MG/3ML Solution Pen-injector      6. Coronary-myocardial bridge            Medical Decision Making: Today's Assessment/Status/Plan:          Coronary myocardial bridge  - continue metoprolol XL     2. Hyperlipidemia   - continue atorvastatin 40mg qd    3. Hypertension:  - continue lisinopril, amlodipine and bb therapy     4. Obesity and prediabetes   - discussed in the length risk and benefit of ozempic. We agree to proceed with 0.5mg ozempic q weekly. Diet and exercise discussed alongside    5. Sleep disorder breathing and insomina   - referral for sleep apnea     Follow up in 3 months, sooner as needed.

## 2024-05-31 ENCOUNTER — TELEPHONE (OUTPATIENT)
Dept: CARDIOLOGY | Facility: MEDICAL CENTER | Age: 69
End: 2024-05-31
Payer: MEDICARE

## 2024-05-31 NOTE — TELEPHONE ENCOUNTER
Received [employer] PA request via [employer]  for Ozempic (0.25 or 0.5 MG/DOSE) 2MG/3ML pen-injectors. (Quantity:2, Day Supply:28)     Insurance: [employer]  Member ID:  [id number]  BIN: [id number]  PCN: [id number]  Group: [id number]     Ran Test claim via [employer] & medication Rejects stating prior authorization is required.

## 2024-05-31 NOTE — TELEPHONE ENCOUNTER
Prior Authorization for Ozempic (0.25 or 0.5 MG/DOSE) 2MG/3ML pen-injectors (Quantity: 2, Days: 28) has been submitted via [id number]    Insurance: [place]    Will follow up in 24-48 business hours.

## 2024-06-03 ENCOUNTER — TELEPHONE (OUTPATIENT)
Dept: CARDIOLOGY | Facility: MEDICAL CENTER | Age: 69
End: 2024-06-03
Payer: MEDICARE

## 2024-06-03 DIAGNOSIS — R94.39 ABNORMAL CARDIOVASCULAR STRESS TEST: ICD-10-CM

## 2024-06-03 DIAGNOSIS — R73.03 PREDIABETES: ICD-10-CM

## 2024-06-03 DIAGNOSIS — I20.89 ATYPICAL ANGINA (HCC): ICD-10-CM

## 2024-06-03 NOTE — TELEPHONE ENCOUNTER
Robert Mccann     The PA was denied for Ozempic. Per her insurance its not cover for weight loss. How would MD like to proceed?

## 2024-06-07 ENCOUNTER — TELEPHONE (OUTPATIENT)
Dept: CARDIOLOGY | Facility: MEDICAL CENTER | Age: 69
End: 2024-06-07
Payer: MEDICARE

## 2024-06-07 NOTE — TELEPHONE ENCOUNTER
Prior Authorization for Wegovy 0.25MG/0.5ML auto-injectors (Quantity: 2, Days: 28) has been submitted via [id number]    Insurance: [employer]    Will follow up in 24-48 business hours.

## 2024-06-07 NOTE — TELEPHONE ENCOUNTER
Prior Authorization for   Wegovy 0.25MG/0.5ML auto-injectors       has been approved for a quantity of 4 , day supply 28    Prior Authorization reference number: [id number]  Insurance: [employer]  Effective dates: 6/7/24 to 12/31/99  Copay: $774.43     Is patient eligible to fill with [facility]? Yes    Next Steps: The patient's copay exceeds $5.00. Proceed with contacting patient to offer financial assistance.    Phone number not in service

## 2024-06-07 NOTE — TELEPHONE ENCOUNTER
Received [employer] PA request via [employer]  for Wegovy 0.25MG/0.5ML auto-injectors  . (Quantity:2, Day Supply:28)     Insurance: [employer]  Member ID:  [id number]  BIN: [id number]  PCN: [id number]  Group: [id number]     Ran Test claim via [place] & medication Rejects stating prior authorization is required.

## 2024-06-15 DIAGNOSIS — R79.89 LOW TESTOSTERONE: ICD-10-CM

## 2024-06-17 RX ORDER — TESTOSTERONE CYPIONATE 200 MG/ML
INJECTION, SOLUTION INTRAMUSCULAR
Qty: 2 ML | Refills: 2 | Status: SHIPPED | OUTPATIENT
Start: 2024-06-17 | End: 2024-09-15

## 2024-06-17 NOTE — TELEPHONE ENCOUNTER
Received request via: Pharmacy    Was the patient seen in the last year in this department? Yes    Does the patient have an active prescription (recently filled or refills available) for medication(s) requested? No    Pharmacy Name: [facility]     Does the patient have [facility] Plus and need 100 day supply (blood pressure, diabetes and cholesterol meds only)? Patient does not have SCP

## 2024-07-02 ENCOUNTER — TELEPHONE (OUTPATIENT)
Dept: HEALTH INFORMATION MANAGEMENT | Facility: OTHER | Age: 69
End: 2024-07-02
Payer: MEDICARE

## 2024-09-16 DIAGNOSIS — R79.89 LOW TESTOSTERONE: ICD-10-CM

## 2024-09-16 NOTE — TELEPHONE ENCOUNTER
Received request via: Patient    Was the patient seen in the last year in this department? Yes    Does the patient have an active prescription (recently filled or refills available) for medication(s) requested? No    [facility] Name[facility]    Does the patient have residential Plus and need 100-day supply? (This applies to ALL medications) Patient does not have SCP

## 2024-09-17 DIAGNOSIS — R79.89 LOW TESTOSTERONE: ICD-10-CM

## 2024-09-17 RX ORDER — TESTOSTERONE CYPIONATE 200 MG/ML
200 INJECTION, SOLUTION INTRAMUSCULAR
Qty: 1 ML | Refills: 0 | Status: SHIPPED | OUTPATIENT
Start: 2024-09-17 | End: 2024-12-16

## 2024-09-17 RX ORDER — TESTOSTERONE CYPIONATE 200 MG/ML
INJECTION, SOLUTION INTRAMUSCULAR
Qty: 2 ML | Refills: 2 | Status: SHIPPED | OUTPATIENT
Start: 2024-09-17 | End: 2024-09-17 | Stop reason: SDUPTHER

## 2024-09-17 NOTE — TELEPHONE ENCOUNTER
Received request via: Patient    Was the patient seen in the last year in this department? Yes    Does the patient have an active prescription (recently filled or refills available) for medication(s) requested? No    Pharmacy Name: [facility]     Does the patient have [facility] Plus and need 100-day supply? (This applies to ALL medications) Patient does not have SCP

## 2024-10-16 DIAGNOSIS — J06.9 VIRAL URI WITH COUGH: ICD-10-CM

## 2024-10-16 RX ORDER — ALBUTEROL SULFATE 90 UG/1
INHALANT RESPIRATORY (INHALATION)
Qty: 18 G | Refills: 10 | Status: SHIPPED | OUTPATIENT
Start: 2024-10-16

## 2024-12-13 DIAGNOSIS — R79.89 LOW TESTOSTERONE: ICD-10-CM

## 2024-12-13 NOTE — TELEPHONE ENCOUNTER
Received request via: Pharmacy    Was the patient seen in the last year in this department? Yes    Does the patient have an active prescription (recently filled or refills available) for medication(s) requested? No    Pharmacy Name: [facility]     Does the patient have senior living Plus and need 100-day supply? (This applies to ALL medications) Patient does not have SCP

## 2024-12-16 RX ORDER — TESTOSTERONE CYPIONATE 200 MG/ML
INJECTION, SOLUTION INTRAMUSCULAR
Qty: 2 ML | Refills: 2 | Status: SHIPPED | OUTPATIENT
Start: 2024-12-16 | End: 2025-01-15

## 2024-12-17 DIAGNOSIS — I10 ESSENTIAL HYPERTENSION, BENIGN: ICD-10-CM

## 2024-12-17 RX ORDER — INDAPAMIDE 2.5 MG/1
2.5 TABLET ORAL EVERY MORNING
Qty: 90 TABLET | Refills: 3 | Status: SHIPPED | OUTPATIENT
Start: 2024-12-17

## 2024-12-30 DIAGNOSIS — I10 ESSENTIAL HYPERTENSION, BENIGN: ICD-10-CM

## 2024-12-30 RX ORDER — LISINOPRIL 40 MG/1
40 TABLET ORAL DAILY
Qty: 90 TABLET | Refills: 1 | Status: SHIPPED | OUTPATIENT
Start: 2024-12-30

## 2024-12-30 NOTE — TELEPHONE ENCOUNTER
Is the patient due for a refill? Yes    Was the patient seen the past year? Yes    Date of last office visit: 5/24/2024    Does the patient have an upcoming appointment?  No    Provider to refill:RT    Does the patient have [facility]P Plus and need 100-day supply? (This applies to ALL medications) Patient does not have SCP

## 2025-01-03 DIAGNOSIS — I10 ESSENTIAL HYPERTENSION, BENIGN: ICD-10-CM

## 2025-01-06 RX ORDER — LISINOPRIL 40 MG/1
40 TABLET ORAL DAILY
Qty: 90 TABLET | Refills: 2 | OUTPATIENT
Start: 2025-01-06

## 2025-01-07 ENCOUNTER — OFFICE VISIT (OUTPATIENT)
Dept: SLEEP MEDICINE | Facility: MEDICAL CENTER | Age: 70
End: 2025-01-07
Attending: NURSE PRACTITIONER
Payer: MEDICARE

## 2025-01-07 VITALS
SYSTOLIC BLOOD PRESSURE: 122 MMHG | HEIGHT: 72 IN | DIASTOLIC BLOOD PRESSURE: 72 MMHG | BODY MASS INDEX: 42.66 KG/M2 | HEART RATE: 67 BPM | WEIGHT: 315 LBS | RESPIRATION RATE: 16 BRPM | OXYGEN SATURATION: 94 %

## 2025-01-07 DIAGNOSIS — I10 PRIMARY HYPERTENSION: ICD-10-CM

## 2025-01-07 DIAGNOSIS — G47.30 SLEEP DISORDER BREATHING: Primary | ICD-10-CM

## 2025-01-07 DIAGNOSIS — R06.09 DYSPNEA ON EXERTION: ICD-10-CM

## 2025-01-07 DIAGNOSIS — R73.03 PREDIABETES: ICD-10-CM

## 2025-01-07 DIAGNOSIS — R05.3 CHRONIC COUGH: ICD-10-CM

## 2025-01-07 PROCEDURE — 3074F SYST BP LT 130 MM HG: CPT | Performed by: STUDENT IN AN ORGANIZED HEALTH CARE EDUCATION/TRAINING PROGRAM

## 2025-01-07 PROCEDURE — 99213 OFFICE O/P EST LOW 20 MIN: CPT | Performed by: NURSE PRACTITIONER

## 2025-01-07 PROCEDURE — 99204 OFFICE O/P NEW MOD 45 MIN: CPT | Performed by: STUDENT IN AN ORGANIZED HEALTH CARE EDUCATION/TRAINING PROGRAM

## 2025-01-07 PROCEDURE — 3078F DIAST BP <80 MM HG: CPT | Performed by: STUDENT IN AN ORGANIZED HEALTH CARE EDUCATION/TRAINING PROGRAM

## 2025-01-07 ASSESSMENT — PATIENT HEALTH QUESTIONNAIRE - PHQ9: CLINICAL INTERPRETATION OF PHQ2 SCORE: 0

## 2025-01-07 NOTE — PROGRESS NOTES
[facility] Consult Note     Date: 1/7/2025 / Time: 12:42 PM      Thank you for requesting a sleep medicine consultation on Car Coyle at the sleep center. Presents today with the chief complaints of snoring, dry mouth. He is referred by ANTHONY Gutierrez  [address]  [address]  [place] [address] for evaluation and treatment of sleep disorder breathing .     HISTORY OF PRESENT ILLNESS:     Car Coyle is a 69 y.o. male with hypertension, hyperlipidemia, morbid obesity, BPH, and snoring.  Presents to Sleep Clinic for evaluation of sleep.     He reports was recommended him by his cardiology team to have his sleep evaluated for potential sleep apnea.    He is accompanied by his wife at today's visit.    He generally has no trouble falling asleep.  He does have multiple awakenings at night which can make it difficult to get back to sleep.  During the day he can nap sometimes unintentionally in the afternoon in the evening.  He has been told that he snores in his sleep.  Denies any choking or gasping or witnessed apneas.  He does occasionally wake with dry mouth.    As per supplemental questionnaire to be scanned or imported into chart:    [place] Sleepiness Score: 7    Sleep Schedule  Bedtime: Weekday & Weekend 1130-12am  Wake time: Weekday & Weekend 6-630am   Sleep-onset latency: 5-10 min   Awakenings from sleep: 3   Difficulty falling back asleep: sometimes   Bedroom partner: yes   Naps: Yes    DAYTIME SYMPTOMS:   Excessive daytime sleepiness: No   Daytime fatigue: No   Difficulty concentrating: No   Memory problems: No   Irritability:No   Work/school performance issues: No   Sleepiness with driving: occasional in last year in afternoon.   Caffeine/stimulant use: Yes  Alcohol use:Yes, How Many? 1-3 a week      SLEEP RELATED SYMPTOMS  Snoring: Yes  Witnessed apnea or gasping/choking: No   Dry mouth or mouth breathing: Yes  Sweating: No   Teeth grinding/biting: No   Morning  "headaches: No   Refreshed Upon Awakening: Yes     SLEEP RELATED BEHAVIORS:  Parasomnias (walking, talking, eating, violence): No   Leg kicking: No   Restless legs - \"urge to move\": No   Nightmares: No  Recurrent: No   Dream enactment: No      NARCOLEPSY:  Cataplexy: No   Sleep paralysis: No   Sleep attacks: No   Hypnagogic/hypnopompic hallucinations: No     MEDICAL HISTORY  Past Medical History:   Diagnosis Date    Daytime sleepiness     HDL deficiency 06/13/2012    High cholesterol     HTN (hypertension) 06/13/2012    Hyperlipidemia 06/13/2012    Hypertriglyceridemia 06/13/2012    Hypotestosteronism 06/27/2012    Seasonal allergies     Inhaler us as needed.    Sleep disorder breathing 10/17/2014    Oct 2014. Abnormal overnight oximetry. 136 desats.      Snoring     Vitamin d deficiency 06/27/2012        SURGICAL HISTORY  Past Surgical History:   Procedure Laterality Date    KNEE ARTHROSCOPY  2005    right knee    EYE SURGERY      cataract surgery     INCISION HERNIA REPAIR  1970s    TONSILLECTOMY          FAMILY HISTORY  Family History   Problem Relation Age of Onset    Heart Failure Mother     Genetic Disorder Father         alzheimer's    Hypertension Sister     Hypertension Sister     Diabetes Sister     Cancer Maternal Aunt         breast cancer       SOCIAL HISTORY  Social History     Socioeconomic History    Marital status:     Number of children: 1    Highest education level: Some college, no degree   Occupational History    Occupation: Realtor Residential     Employer: unknown   Tobacco Use    Smoking status: Never    Smokeless tobacco: Never   Vaping Use    Vaping status: Never Used   Substance and Sexual Activity    Alcohol use: Yes     Comment: 3-4 times a week, 1-2 drinks.    Drug use: No    Sexual activity: Yes     Partners: Female   Social History Narrative    Employed as Realtor. Plays [employer] twice per week.      Social Drivers of Health     Financial Resource Strain: Low Risk  (6/3/2023) "    Overall Financial Resource Strain (CARDIA)     Difficulty of Paying Living Expenses: Not very hard   Food Insecurity: No Food Insecurity (6/3/2023)    Hunger Vital Sign     Worried About Running Out of Food in the Last Year: Never true     Ran Out of Food in the Last Year: Never true   Transportation Needs: No Transportation Needs (6/3/2023)    PRAPARE - Transportation     Lack of Transportation (Medical): No     Lack of Transportation (Non-Medical): No   Physical Activity: Sufficiently Active (6/3/2023)    Exercise Vital Sign     Days of Exercise per Week: 3 days     Minutes of Exercise per Session: 100 min   Stress: Stress Concern Present (6/3/2023)    [place] of Occupational Health - Occupational Stress Questionnaire     Feeling of Stress : To some extent   Social Connections: Unknown (6/3/2023)    Social Connection and Isolation Panel [NHANES]     Frequency of Communication with Friends and Family: Twice a week     Frequency of Social Gatherings with Friends and Family: Three times a week     Attends Mormon Services: Patient declined     Active Member of Clubs or Organizations: No     Attends Club or Organization Meetings: Never     Marital Status:    Housing Stability: Low Risk  (6/3/2023)    Housing Stability Vital Sign     Unable to Pay for Housing in the Last Year: No     Number of Places Lived in the Last Year: 1     Unstable Housing in the Last Year: No        Occupation: Retired     CURRENT MEDICATIONS  Current Outpatient Medications   Medication Sig Dispense Refill    lisinopril (PRINIVIL) 40 MG tablet Take 1 Tablet by mouth every day. 90 Tablet 1    indapamide (LOZOL) 2.5 MG Tab TAKE 1 TABLET EVERY MORNING 90 Tablet 3    testosterone cypionate (DEPO-TESTOSTERONE) 200 MG/ML injection INJECT 1 ML INTO THE SHOULDER, THIGH, OR BUTTOCKS EVERY 14 DAYS FOR 84 DAYS. 2 mL 2    albuterol 108 (90 Base) MCG/ACT Aero Soln inhalation aerosol USE 1 TO 2 INHALATIONS ORALLY EVERY 4 HOURS AS NEEDED  FOR SHORTNESS OF BREATH 18 g 10    Semaglutide (WEGOVY) 0.25 MG/0.5ML Solution Auto-injector Pen-injector Inject 0.5 mL under the skin every 7 days. 4 Each 0    atorvastatin (LIPITOR) 40 MG Tab TAKE 1 TABLET BY MOUTH EVERY DAY IN THE EVENING 90 Tablet 1    metoprolol SR (TOPROL XL) 25 MG TABLET SR 24 HR Take 0.5 Tablets by mouth every evening. 90 Tablet 1    amLODIPine (NORVASC) 10 MG Tab Take 1 Tablet by mouth every day. 90 Tablet 3    nitroglycerin (NITROSTAT) 0.4 MG SL Tab PLACE 1 TABLET UNDER THE TONGUE AS NEEDED FOR CHEST PAIN. 25 Tablet 1    Multiple Vitamins-Minerals (MENS MULTI VITAMIN & MINERAL PO) Take  by mouth.      acetaminophen (TYLENOL) 500 MG Tab Take 1,000 mg by mouth every 6 hours as needed.      Lactobacillus Rhamnosus, GG, (CULTURELLE IMMUNITY SUPPORT PO) Take 1 Tablet by mouth every day.      fluticasone (FLONASE) 50 MCG/ACT nasal spray Administer 1 Spray into affected nostril(S) every day.      MULTIPLE VITAMIN PO Take 1 Tab by mouth every day.       No current facility-administered medications for this visit.       REVIEW OF SYSTEMS  Constitutional: Denies fevers, Denies weight changes  Ears/Nose/Throat/Mouth: Denies nasal congestion or sore throat   Cardiovascular: Denies chest pain  Respiratory: Denies shortness of breath, Denies cough  Gastrointestinal/Hepatic: Denies nausea, vomiting  Sleep: see HPI    Physical Examination:  Vitals/ General Appearance:   Weight/BMI: Body mass index is 45.54 kg/m².  /72 (BP Location: Left arm, Patient Position: Sitting, BP Cuff Size: Adult)   Pulse 67   Resp 16   Ht 1.829 m (6')   Wt (!) 152 kg (335 lb 12.8 oz)   SpO2 94%   Vitals:    01/07/25 1239   BP: 122/72   BP Location: Left arm   Patient Position: Sitting   BP Cuff Size: Adult   Pulse: 67   Resp: 16   SpO2: 94%   Weight: (!) 152 kg (335 lb 12.8 oz)   Height: 1.829 m (6')       Pt. is alert and oriented to time, place and person. Cooperative and in no apparent distress.     Constitutional:  Alert, no distress, well-groomed.  Skin: No rashes in visible areas.  Eye: Round. Conjunctiva clear, lids normal. No icterus.   ENT EXAM  Nasal alae/valves collapsible: No   Nasal septum deviation: Yes  Nasal turbinate hypertrophy: No  Hard palate narrow: No   Hard palate high: No   Soft palate/uvula (Mallampati score): 4  Tongue Scalloping: Yes  Retrognathia: No   Micrognathia: No   Cardiovascular:no murmus/gallops/rubs, normal S1 and S2 heart sounds, regular rate and rhythm  Pulmonary:Clear to auscultation, No wheezes, No crackles.  Neurologic:Awake, alert and oriented x 3, Normal age appropriate gait, No involuntary motions.  Extremities: No clubbing, cyanosis, or edema       ASSESSMENT AND PLAN   Car Coyle is a 69 y.o. male with hypertension, hyperlipidemia, morbid obesity, BPH, and snoring.  Presents to Sleep Clinic for evaluation of sleep.     Car Coyle  has symptoms of Obstructive Sleep Apnea (ESTELA). Car Coyle has symptoms of snoring, dry mouth.    Pt has risk factors for ESTELA include obesity and crowded oropharynx.     The pathophysiology of ESTELA and the increased risk of cardiovascular morbidity from untreated ESTELA is discussed in detail with the patient. He  also has HTN, heart disease which can be worsened by ESTELA.  Given his weight he is also at risk for obesity hypoventilation syndrome.  Discussed risk factors for obesity hypoventilation syndrome.  Discussed pathophysiology of obesity hypoventilation syndrome      We have discussed diagnostic options including in-laboratory, attended polysomnography and home sleep testing. We have also discussed treatment options including airway pressurization, reconstructive otolaryngologic surgery, dental appliances and weight management.     Subsequently,treatment options will be discussed based on the diagnostic study. Meanwhile, He is urged to avoid supine sleep, weight gain and alcoholic beverages since all of these can worsen ESTELA. He  is cautioned against drowsy driving. If He feels sleepy while driving, advised must pull over for a break/nap, rather than persist on the road, in the interest of Pt's own safety and that of others on the road.    Plan  -  He  will be scheduled for an overnight PSG to assess sleep related breathing disorder.  - Follow up 1-2 weeks after sleep study to discuss results and treatment options moving forward   -Advised to reach out via MyChart or by phone with any questions or concerns.       Please note portions of this record was created using voice recognition software. I have made every reasonable attempt to correct obvious errors, but I expect that there are errors of grammar and possibly content I did not discover before finalizing the note.

## 2025-01-28 ENCOUNTER — APPOINTMENT (OUTPATIENT)
Dept: SLEEP MEDICINE | Facility: MEDICAL CENTER | Age: 70
End: 2025-01-28
Attending: STUDENT IN AN ORGANIZED HEALTH CARE EDUCATION/TRAINING PROGRAM
Payer: MEDICARE

## 2025-02-04 ENCOUNTER — NON-PROVIDER VISIT (OUTPATIENT)
Dept: SLEEP MEDICINE | Facility: MEDICAL CENTER | Age: 70
End: 2025-02-04
Attending: STUDENT IN AN ORGANIZED HEALTH CARE EDUCATION/TRAINING PROGRAM
Payer: MEDICARE

## 2025-02-04 VITALS — WEIGHT: 315 LBS | BODY MASS INDEX: 42.66 KG/M2 | HEIGHT: 72 IN

## 2025-02-04 DIAGNOSIS — R05.3 CHRONIC COUGH: ICD-10-CM

## 2025-02-04 DIAGNOSIS — J98.11 CHRONIC ATELECTASIS: Chronic | ICD-10-CM

## 2025-02-04 DIAGNOSIS — R06.09 DYSPNEA ON EXERTION: ICD-10-CM

## 2025-02-04 PROCEDURE — 94729 DIFFUSING CAPACITY: CPT | Mod: 26 | Performed by: INTERNAL MEDICINE

## 2025-02-04 PROCEDURE — 94060 EVALUATION OF WHEEZING: CPT | Performed by: STUDENT IN AN ORGANIZED HEALTH CARE EDUCATION/TRAINING PROGRAM

## 2025-02-04 PROCEDURE — 94729 DIFFUSING CAPACITY: CPT | Performed by: STUDENT IN AN ORGANIZED HEALTH CARE EDUCATION/TRAINING PROGRAM

## 2025-02-04 PROCEDURE — 94726 PLETHYSMOGRAPHY LUNG VOLUMES: CPT | Mod: 26 | Performed by: INTERNAL MEDICINE

## 2025-02-04 PROCEDURE — 94060 EVALUATION OF WHEEZING: CPT | Mod: 26 | Performed by: INTERNAL MEDICINE

## 2025-02-04 PROCEDURE — 94726 PLETHYSMOGRAPHY LUNG VOLUMES: CPT | Performed by: STUDENT IN AN ORGANIZED HEALTH CARE EDUCATION/TRAINING PROGRAM

## 2025-02-04 NOTE — PROCEDURES
Tech: Kaykay Perry CRT  Good patient effort & cooperation.  Test was performed on the [employer] Body Plethysmograph- Elite DX system.  The predicted sets used for Spirometry are GLI-2012, for Lung Volumes are ITS, and for DLCO is GLI 2017.  The results of this test meet the ATS standards for acceptability and repeatability.  The DLCO was uncorrected for Hb.  A bronchodilator of Ventolin HFA 2 puffs via spacer was administered.  DLCO was performed during dilation period.    Interpretation:  Normal baseline spirometry with a negative bronchodilator response.   Flow volume loops are unremarkable.   Full lung volumes demonstrate severe reduction in ERV secondary to BMI 46.6.   The DLCO is markedly elevated.     Summary:  Findings consistent with chronic atelectasis secondary to obesity. The elevation in DLCO may be secondary to obesity but can also be caused by asthma. Consider methacholine challenge testing if asthma is suspected.     I, Korey Perez DO, am the author of this note.     Korey Perez DO, [facility]  Staff Pulmonologist and Intensivist  [facility]     Please note that this dictation was created using voice recognition software. The accuracy of the dictation is limited to the abilities of the software. I have made every reasonable attempt to correct obvious errors, but I expect that there are errors of grammar and possibly content that I did not discover before finalizing the note.

## 2025-02-05 PROBLEM — J98.11 CHRONIC ATELECTASIS: Chronic | Status: ACTIVE | Noted: 2025-02-05

## 2025-03-11 DIAGNOSIS — E78.2 MIXED HYPERLIPIDEMIA: ICD-10-CM

## 2025-03-11 DIAGNOSIS — R73.03 PREDIABETES: ICD-10-CM

## 2025-03-11 DIAGNOSIS — I10 ESSENTIAL HYPERTENSION: ICD-10-CM

## 2025-03-11 DIAGNOSIS — E66.01 MORBID OBESITY WITH BMI OF 40.0-44.9, ADULT (HCC): ICD-10-CM

## 2025-03-11 RX ORDER — ATORVASTATIN CALCIUM 40 MG/1
40 TABLET, FILM COATED ORAL EVERY EVENING
Qty: 90 TABLET | Refills: 1 | Status: SHIPPED | OUTPATIENT
Start: 2025-03-11

## 2025-03-11 NOTE — TELEPHONE ENCOUNTER
Refilled under AL having seen pt.     To schedulers, please reach out to pt to schedule FV. Thank you!

## 2025-03-11 NOTE — TELEPHONE ENCOUNTER
Is the patient due for a refill? Yes    Was the patient seen the last 15 months? Yes    Date of last office visit: 5.24.2024    Does the patient have an upcoming appointment?  No      Provider to refill:RT    Does the patient have group home Plus and need 100-day supply? (This applies to ALL medications) Patient does not have SCP

## 2025-05-02 ASSESSMENT — ENCOUNTER SYMPTOMS
RESPIRATORY SYMPTOMS COMMENTS: NO
DYSPNEA AT REST: 0
CHEST TIGHTNESS: 0
SHORTNESS OF BREATH: 1
WHEEZING: 1
HEMOPTYSIS: 0

## 2025-05-05 ENCOUNTER — OFFICE VISIT (OUTPATIENT)
Dept: SLEEP MEDICINE | Facility: MEDICAL CENTER | Age: 70
End: 2025-05-05
Attending: STUDENT IN AN ORGANIZED HEALTH CARE EDUCATION/TRAINING PROGRAM
Payer: MEDICARE

## 2025-05-05 VITALS
SYSTOLIC BLOOD PRESSURE: 118 MMHG | OXYGEN SATURATION: 93 % | WEIGHT: 315 LBS | HEART RATE: 64 BPM | DIASTOLIC BLOOD PRESSURE: 60 MMHG | BODY MASS INDEX: 44.1 KG/M2 | HEIGHT: 71 IN

## 2025-05-05 DIAGNOSIS — R09.81 SINUS CONGESTION: ICD-10-CM

## 2025-05-05 DIAGNOSIS — R05.3 CHRONIC COUGH: ICD-10-CM

## 2025-05-05 PROCEDURE — 3078F DIAST BP <80 MM HG: CPT | Performed by: INTERNAL MEDICINE

## 2025-05-05 PROCEDURE — 3074F SYST BP LT 130 MM HG: CPT | Performed by: INTERNAL MEDICINE

## 2025-05-05 PROCEDURE — 99214 OFFICE O/P EST MOD 30 MIN: CPT | Performed by: INTERNAL MEDICINE

## 2025-05-05 PROCEDURE — 99212 OFFICE O/P EST SF 10 MIN: CPT | Performed by: STUDENT IN AN ORGANIZED HEALTH CARE EDUCATION/TRAINING PROGRAM

## 2025-05-05 RX ORDER — AZELASTINE 1 MG/ML
1-2 SPRAY, METERED NASAL 2 TIMES DAILY
Qty: 30 ML | Refills: 11 | Status: SHIPPED | OUTPATIENT
Start: 2025-05-05

## 2025-05-05 ASSESSMENT — ENCOUNTER SYMPTOMS
BACK PAIN: 0
DIZZINESS: 0
SHORTNESS OF BREATH: 1
EYE PAIN: 0
VOMITING: 0
CLAUDICATION: 0
HEMOPTYSIS: 0
MYALGIAS: 0
WEIGHT LOSS: 0
PALPITATIONS: 0
NAUSEA: 0
COUGH: 0
DEPRESSION: 0
ABDOMINAL PAIN: 0
HEADACHES: 0
FEVER: 0
TREMORS: 0
CONSTIPATION: 0
DIAPHORESIS: 0
NECK PAIN: 0
DOUBLE VISION: 0
EYE DISCHARGE: 0
ORTHOPNEA: 0
SPUTUM PRODUCTION: 0
HEARTBURN: 0
SINUS PAIN: 0
EYE REDNESS: 0
WHEEZING: 1
CHILLS: 0
SORE THROAT: 0
PND: 0
STRIDOR: 0
DIARRHEA: 0
WEAKNESS: 0
BLURRED VISION: 0
FALLS: 0
PHOTOPHOBIA: 0
FOCAL WEAKNESS: 0
SPEECH CHANGE: 0

## 2025-05-05 NOTE — PROGRESS NOTES
Chief Complaint   Patient presents with    New Patient     REF BY DR. LOPES FOR CHRONIC COUGH, DYSPNEA ON EXERTION    Results     PFT 2/4/25       HPI: This patient is a 69 y.o. male presenting for evaluation of chronic cough and intermittent wheezing. PMHx is significant for HTN and dyslipidemia both currently well controlled. He has chronic seasonal allergies for which he uses intranasal fluticasone and has uses azelastine in the past.  He is a life-long non-smoker. He is recently retired from work as a realtor. No known occupational exposures. He does have a bird at home.  Family hx is notable for a sister with ? RA. He was referred to me for chronic cough which was present for nearly a year after dori RSV on a cruise in 12/2023. Cough was mainly dry, worse in the evenings and often occurred reclining or when going from being upright to laying. He did note mild wheezing but was not sure if this was related to his sinuses. He was using albuterol up to 4x weekly however over the past 2-3 mos cough has mainly resolved. He continues to have intermittent nasal congestion and has seen both allergy in the remote past and ENT several years ago. He was seen by sleep medicine however had to cancel sleep study and has not rescheduled this. PFT from 2/4/25 showed normal air flows, normal lung volumes and normal DLCO.  Last CBC I have is from 2022 w/o diff. His eos prior to that in [id number] were wnls.     Past Medical History:   Diagnosis Date    Chronic atelectasis 02/05/2025    Reduced ERV secondary to obesity       Daytime sleepiness     HDL deficiency 06/13/2012    High cholesterol     HTN (hypertension) 06/13/2012    Hyperlipidemia 06/13/2012    Hypertriglyceridemia 06/13/2012    Hypotestosteronism 06/27/2012    Seasonal allergies     Inhaler us as needed.    Sleep disorder breathing 10/17/2014    Oct 2014. Abnormal overnight oximetry. 136 desats.      Snoring     Vitamin d deficiency 06/27/2012       Social  History     Socioeconomic History    Marital status:      Spouse name: Not on file    Number of children: 1    Years of education: Not on file    Highest education level: Some college, no degree   Occupational History    Occupation: Realtor Residential     Employer: unknown   Tobacco Use    Smoking status: Never    Smokeless tobacco: Never   Vaping Use    Vaping status: Never Used   Substance and Sexual Activity    Alcohol use: Yes     Comment: 3-4 times a week, 1-2 drinks.    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      Social Drivers of Health     Financial Resource Strain: Low Risk  (6/3/2023)    Overall Financial Resource Strain (CARDIA)     Difficulty of Paying Living Expenses: Not very hard   Food Insecurity: No Food Insecurity (6/3/2023)    Hunger Vital Sign     Worried About Running Out of Food in the Last Year: Never true     Ran Out of Food in the Last Year: Never true   Transportation Needs: No Transportation Needs (6/3/2023)    PRAPARE - Transportation     Lack of Transportation (Medical): No     Lack of Transportation (Non-Medical): No   Physical Activity: Sufficiently Active (6/3/2023)    Exercise Vital Sign     Days of Exercise per Week: 3 days     Minutes of Exercise per Session: 100 min   Stress: Stress Concern Present (6/3/2023)    Citizen of [place] of Occupational Health - Occupational Stress Questionnaire     Feeling of Stress : To some extent   Social Connections: Unknown (6/3/2023)    Social Connection and Isolation Panel [NHANES]     Frequency of Communication with Friends and Family: Twice a week     Frequency of Social Gatherings with Friends and Family: Three times a week     Attends Tenriism Services: Patient declined     Active Member of Clubs or Organizations: No     Attends Club or Organization Meetings: Never     Marital Status:     Intimate Partner Violence: Not on file   Housing Stability: Low Risk  (6/3/2023)    Housing Stability Vital Sign     Unable to Pay for Housing in the Last Year: No     Number of Places Lived in the Last Year: 1     Unstable Housing in the Last Year: No       Family History   Problem Relation Age of Onset    Heart Failure Mother     Genetic Disorder Father         alzheimer's    Hypertension Sister     Hypertension Sister     Diabetes Sister     Cancer Maternal Aunt         breast cancer       Current Outpatient Medications on File Prior to Visit   Medication Sig Dispense Refill    atorvastatin (LIPITOR) 40 MG Tab TAKE 1 TABLET BY MOUTH EVERY DAY IN THE EVENING 90 Tablet 1    lisinopril (PRINIVIL) 40 MG tablet Take 1 Tablet by mouth every day. 90 Tablet 1    albuterol 108 (90 Base) MCG/ACT Aero Soln inhalation aerosol USE 1 TO 2 INHALATIONS ORALLY EVERY 4 HOURS AS NEEDED FOR SHORTNESS OF BREATH 18 g 10    metoprolol SR (TOPROL XL) 25 MG TABLET SR 24 HR Take 0.5 Tablets by mouth every evening. 90 Tablet 1    nitroglycerin (NITROSTAT) 0.4 MG SL Tab PLACE 1 TABLET UNDER THE TONGUE AS NEEDED FOR CHEST PAIN. 25 Tablet 1    Multiple Vitamins-Minerals (MENS MULTI VITAMIN & MINERAL PO) Take  by mouth.      acetaminophen (TYLENOL) 500 MG Tab Take 1,000 mg by mouth every 6 hours as needed.      fluticasone (FLONASE) 50 MCG/ACT nasal spray Administer 1 Spray into affected nostril(S) every day.      MULTIPLE VITAMIN PO Take 1 Tab by mouth every day.      indapamide (LOZOL) 2.5 MG Tab TAKE 1 TABLET EVERY MORNING 90 Tablet 3    Semaglutide (WEGOVY) 0.25 MG/0.5ML Solution Auto-injector Pen-injector Inject 0.5 mL under the skin every 7 days. 4 Each 0    amLODIPine (NORVASC) 10 MG Tab Take 1 Tablet by mouth every day. (Patient not taking: Reported on 5/5/2025) 90 Tablet 3    Lactobacillus Rhamnosus, GG, (CULTURELLE IMMUNITY SUPPORT PO) Take 1 Tablet by mouth every day.       No current facility-administered medications on file  "prior to visit.       Allergies: Hydrochlorothiazide    ROS:   Review of Systems   Constitutional:  Negative for chills, diaphoresis, fever, malaise/fatigue and weight loss.   HENT:  Positive for congestion. Negative for ear discharge, ear pain, hearing loss, nosebleeds, sinus pain, sore throat and tinnitus.    Eyes:  Negative for blurred vision, double vision, photophobia, pain, discharge and redness.   Respiratory:  Positive for shortness of breath and wheezing. Negative for cough, hemoptysis, sputum production and stridor.    Cardiovascular:  Negative for chest pain, palpitations, orthopnea, claudication, leg swelling and PND.   Gastrointestinal:  Negative for abdominal pain, constipation, diarrhea, heartburn, nausea and vomiting.   Genitourinary:  Negative for dysuria and urgency.   Musculoskeletal:  Negative for back pain, falls, joint pain, myalgias and neck pain.   Skin:  Negative for itching and rash.   Neurological:  Negative for dizziness, tremors, speech change, focal weakness, weakness and headaches.   Endo/Heme/Allergies:  Negative for environmental allergies.   Psychiatric/Behavioral:  Negative for depression.        /60 (BP Location: Right arm, Patient Position: Sitting, BP Cuff Size: Adult)   Pulse 64   Ht 1.803 m (5' 11\")   Wt (!) 151 kg (333 lb)   SpO2 93%     Physical Exam:  Physical Exam  Vitals reviewed.   Constitutional:       General: He is not in acute distress.     Appearance: Normal appearance.   HENT:      Head: Normocephalic and atraumatic.      Right Ear: External ear normal.      Left Ear: External ear normal.      Nose: Nose normal. No congestion.      Mouth/Throat:      Mouth: Mucous membranes are moist.      Pharynx: Oropharynx is clear. No oropharyngeal exudate.   Eyes:      General: No scleral icterus.     Extraocular Movements: Extraocular movements intact.      Conjunctiva/sclera: Conjunctivae normal.      Pupils: Pupils are equal, round, and reactive to light. "   Cardiovascular:      Rate and Rhythm: Normal rate and regular rhythm.      Heart sounds: Normal heart sounds. No murmur heard.     No gallop.   Pulmonary:      Effort: Pulmonary effort is normal. No respiratory distress.      Breath sounds: Normal breath sounds. No wheezing or rales.   Abdominal:      Palpations: Abdomen is soft.   Musculoskeletal:         General: Normal range of motion.      Cervical back: Normal range of motion and neck supple.      Right lower leg: No edema.      Left lower leg: No edema.   Skin:     General: Skin is warm and dry.      Findings: No rash.   Neurological:      General: No focal deficit present.      Mental Status: He is alert and oriented to person, place, and time.      Cranial Nerves: No cranial nerve deficit.   Psychiatric:         Mood and Affect: Mood normal.         Behavior: Behavior normal.       PFTs as reviewed by me personally: as per HPI    Imaging as reviewed by me personally: as per HPI    Assessment:  1. Sinus congestion  azelastine (ASTELIN) 0.1 % nasal spray      2. Chronic cough            Plan:  Chronic and presumed atopic. He did not desire to go back to ENT Today but we will resume azelastine and continue intranasal steroids  Resolved but given appearance post-viral illness, he may have some underlying RAD. Cannot r/o GERD which we discussed. Okay to use BARRETT prn and f/u in 6 mos   Return in about 6 months (around 11/5/2025) for chronic cough.

## 2025-05-07 DIAGNOSIS — R07.89 OTHER CHEST PAIN: ICD-10-CM

## 2025-05-07 DIAGNOSIS — R94.39 ABNORMAL CARDIOVASCULAR STRESS TEST: ICD-10-CM

## 2025-05-07 RX ORDER — METOPROLOL SUCCINATE 25 MG/1
TABLET, EXTENDED RELEASE ORAL
Qty: 45 TABLET | Refills: 0 | Status: SHIPPED | OUTPATIENT
Start: 2025-05-07

## 2025-05-07 NOTE — TELEPHONE ENCOUNTER
Is the patient due for a refill? Yes    Was the patient seen the last 15 months? Yes    Date of last office visit: 5/24/24    Does the patient have an upcoming appointment?  Yes   If yes, When? 7/15/25    Provider to refill:JEFF/ADD    Does the patient have [facility] and need 100-day supply? (This applies to ALL medications) Patient does not have SCP

## 2025-05-14 ENCOUNTER — HOSPITAL ENCOUNTER (OUTPATIENT)
Dept: CARDIOLOGY | Facility: MEDICAL CENTER | Age: 70
End: 2025-05-14
Attending: INTERNAL MEDICINE
Payer: MEDICARE

## 2025-05-14 ENCOUNTER — HOSPITAL ENCOUNTER (OUTPATIENT)
Dept: RADIOLOGY | Facility: MEDICAL CENTER | Age: 70
End: 2025-05-14
Attending: INTERNAL MEDICINE
Payer: MEDICARE

## 2025-05-14 DIAGNOSIS — R06.2 WHEEZING: ICD-10-CM

## 2025-05-14 DIAGNOSIS — I25.10 CVD (CARDIOVASCULAR DISEASE): ICD-10-CM

## 2025-05-14 DIAGNOSIS — R06.02 SHORTNESS OF BREATH: ICD-10-CM

## 2025-05-14 DIAGNOSIS — R60.0 LOCALIZED EDEMA: ICD-10-CM

## 2025-05-14 DIAGNOSIS — R20.0 NUMBNESS: ICD-10-CM

## 2025-05-14 LAB
LV EJECT FRACT  99904: 71
LV EJECT FRACT MOD 2C 99903: 70.29
LV EJECT FRACT MOD 4C 99902: 70.99
LV EJECT FRACT MOD BP 99901: 70.98

## 2025-05-14 PROCEDURE — 93970 EXTREMITY STUDY: CPT

## 2025-05-14 PROCEDURE — 93306 TTE W/DOPPLER COMPLETE: CPT | Mod: 26 | Performed by: INTERNAL MEDICINE

## 2025-05-14 PROCEDURE — 93922 UPR/L XTREMITY ART 2 LEVELS: CPT

## 2025-05-14 PROCEDURE — 93306 TTE W/DOPPLER COMPLETE: CPT

## 2025-06-27 DIAGNOSIS — R07.89 OTHER CHEST PAIN: ICD-10-CM

## 2025-06-27 DIAGNOSIS — R94.39 ABNORMAL CARDIOVASCULAR STRESS TEST: ICD-10-CM

## 2025-06-27 DIAGNOSIS — E78.2 MIXED HYPERLIPIDEMIA: ICD-10-CM

## 2025-06-27 DIAGNOSIS — I25.10 CVD (CARDIOVASCULAR DISEASE): ICD-10-CM

## 2025-06-27 DIAGNOSIS — I10 ESSENTIAL HYPERTENSION: Primary | ICD-10-CM

## 2025-06-27 RX ORDER — METOPROLOL SUCCINATE 25 MG/1
12.5 TABLET, EXTENDED RELEASE ORAL DAILY
Qty: 45 TABLET | Refills: 0 | Status: SHIPPED | OUTPATIENT
Start: 2025-06-27

## 2025-06-27 NOTE — TELEPHONE ENCOUNTER
Chart reviewed re:metoprolol    Last OV 5/29/24 RTC 3 months  FV 7/15/25 AL    Labs needs BMP/lipid no labs since 2023    Courtesy refill 30 days. Pt has FV 7/15/25 AL    [employer] message to pt updating on lab requirements

## 2025-06-27 NOTE — TELEPHONE ENCOUNTER
Is the patient due for a refill? No    Was the patient seen the last 15 months? Yes    Date of last office visit: 05/24/2024    Does the patient have an upcoming appointment?  Yes   If yes, When? 07/15/2025    Provider to refill:RT    Does the patient have long-term Plus and need 100-day supply? (This applies to ALL medications) Patient does not have SCP

## 2025-07-15 ENCOUNTER — OFFICE VISIT (OUTPATIENT)
Dept: CARDIOLOGY | Facility: MEDICAL CENTER | Age: 70
End: 2025-07-15
Attending: INTERNAL MEDICINE
Payer: MEDICARE

## 2025-07-15 VITALS
WEIGHT: 315 LBS | DIASTOLIC BLOOD PRESSURE: 70 MMHG | OXYGEN SATURATION: 94 % | HEIGHT: 71 IN | RESPIRATION RATE: 18 BRPM | HEART RATE: 60 BPM | SYSTOLIC BLOOD PRESSURE: 130 MMHG | BODY MASS INDEX: 44.1 KG/M2

## 2025-07-15 DIAGNOSIS — E66.813 CLASS 3 SEVERE OBESITY DUE TO EXCESS CALORIES WITH BODY MASS INDEX (BMI) OF 45.0 TO 49.9 IN ADULT: ICD-10-CM

## 2025-07-15 DIAGNOSIS — I45.10 INCOMPLETE RBBB: ICD-10-CM

## 2025-07-15 DIAGNOSIS — I10 HYPERTENSION, UNSPECIFIED TYPE: Primary | ICD-10-CM

## 2025-07-15 DIAGNOSIS — E78.2 MIXED HYPERLIPIDEMIA: ICD-10-CM

## 2025-07-15 DIAGNOSIS — N52.8 OTHER MALE ERECTILE DYSFUNCTION: ICD-10-CM

## 2025-07-15 DIAGNOSIS — R07.89 OTHER CHEST PAIN: ICD-10-CM

## 2025-07-15 LAB — EKG IMPRESSION: NORMAL

## 2025-07-15 PROCEDURE — 99215 OFFICE O/P EST HI 40 MIN: CPT | Performed by: INTERNAL MEDICINE

## 2025-07-15 PROCEDURE — 3075F SYST BP GE 130 - 139MM HG: CPT | Performed by: INTERNAL MEDICINE

## 2025-07-15 PROCEDURE — 93010 ELECTROCARDIOGRAM REPORT: CPT | Performed by: INTERNAL MEDICINE

## 2025-07-15 PROCEDURE — 93005 ELECTROCARDIOGRAM TRACING: CPT | Mod: TC | Performed by: INTERNAL MEDICINE

## 2025-07-15 PROCEDURE — 99212 OFFICE O/P EST SF 10 MIN: CPT | Performed by: INTERNAL MEDICINE

## 2025-07-15 PROCEDURE — 3078F DIAST BP <80 MM HG: CPT | Performed by: INTERNAL MEDICINE

## 2025-07-15 RX ORDER — TESTOSTERONE CYPIONATE 200 MG/ML
VIAL (ML) INTRAMUSCULAR
COMMUNITY

## 2025-07-15 RX ORDER — TIRZEPATIDE 5 MG/.5ML
5 INJECTION, SOLUTION SUBCUTANEOUS
Qty: 2 ML | Refills: 3 | Status: SHIPPED | OUTPATIENT
Start: 2025-07-15

## 2025-07-15 RX ORDER — BETAMETHASONE DIPROPIONATE 0.5 MG/G
CREAM TOPICAL
COMMUNITY
Start: 2025-07-14

## 2025-07-15 RX ORDER — NITROGLYCERIN 0.4 MG/1
0.4 TABLET SUBLINGUAL PRN
Qty: 25 TABLET | Refills: 1 | Status: SHIPPED | OUTPATIENT
Start: 2025-07-15

## 2025-07-15 RX ORDER — HYDRALAZINE HYDROCHLORIDE 25 MG/1
12.5 TABLET, FILM COATED ORAL 2 TIMES DAILY
Qty: 45 TABLET | Refills: 3 | Status: SHIPPED | OUTPATIENT
Start: 2025-07-15

## 2025-07-15 RX ORDER — SILDENAFIL 100 MG/1
100 TABLET, FILM COATED ORAL
Qty: 10 TABLET | Refills: 3 | Status: SHIPPED | OUTPATIENT
Start: 2025-07-15

## 2025-07-15 NOTE — PROGRESS NOTES
CARDIOLOGY established PATIENT:    PCP: Erin Pathak M.D.    1. Hypertension, unspecified type    2. Mixed hyperlipidemia    3. Class 3 severe obesity due to excess calories with body mass index (BMI) of 45.0 to 49.9 in adult    4. Other male erectile dysfunction    5. Other chest pain    6. Incomplete RBBB        Car Coyle is here for follow-up for general cardiovascular care    Chief Complaint   Patient presents with    Hyperlipidemia    Hypertension       History: Car Coyle is a 70 y.o. male with history of class III obesity, hypertension, mid LAD myocardial bridge, hypogonadism on chronic testosterone supplementation, polycythemia, dyslipidemia is here for follow-up    11/2022 heart cath: increased marielos to 40mg    Clinic 5/24/23: started Ozempic    Stopped HCTZ 11/2023 due to FUAD    Amlodipine stopped due to FADUMO 4/2025 by PCP. He took Indapamide 5mg for 2 weeks and that raised his Cr hence decreased back to 2.5mg daily.    Lost 10lbs last 2.5 weeks on low carb diet.  He is trying his best.    Some headaches.     Denies any chest pain, shortness of breath concerns.  Lower extremity edema improved after stopping amlodipine.  Denies orthopnea, PND or syncope    Continues to have suboptimal results on sildenafil as needed and testosterone supplementations regarding his ED.    Reviewed home blood pressure log 130/70's mmHg overall    Non-smoker  Accompanied by his wife    Labs 6/13/25 at [facility]:  Hg 20.9  Free test 12.4 (was 7.7 6/2024)  Normal CMP except for Ca 10.3   (129 5/2023), HDL 34,      Normal TSH / FT4 4/2025  Cr 1.31 4/2025  Cr 1.24 6/2025    TTE 5/2025: Personally reviewed  No prior study is available for comparison.   Normal transthoracic echocardiogram    LHC / IFR 11/2022: Personally performed  Mid LAD myocardial bridge IFR 0.79  Nonobstructive CAD otherwise  Hypertension  Elevated resting LVEDP with no significant transaortic gradient on pullback        PE:  BP  "130/70 (BP Location: Left arm, Patient Position: Sitting, BP Cuff Size: Adult)   Pulse 60   Resp 18   Ht 1.803 m (5' 11\")   Wt (!) 148 kg (327 lb)   SpO2 94%   BMI 45.61 kg/m²     Gen: well  HEENT: Symmetric face.  NECK: No JVD.   CARDIAC: Regular, Normal S1, S2, No murmur  VASCULATURE: carotids are normal bilaterally without bruit  RESP: Clear to auscultation bilaterally   EXT: Chronic venous stasis changes, trace pitting edema  SKIN: Warm and dry  NEURO: No gross deficits  PSYCH: Appropriate affect, participates in conversation    The ASCVD Risk score (Ashanti SIGALA, et al., 2019) failed to calculate.    Past Medical History[1]  Past Surgical History[2]  Allergies[3]  Encounter Medications[4]  Social History     Socioeconomic History    Marital status:      Spouse name: Not on file    Number of children: 1    Years of education: Not on file    Highest education level: Some college, no degree   Occupational History    Occupation: Realtor Residential     Employer: unknown   Tobacco Use    Smoking status: Never    Smokeless tobacco: Never   Vaping Use    Vaping status: Never Used   Substance and Sexual Activity    Alcohol use: Yes     Alcohol/week: 3.0 - 3.6 oz     Types: 5 - 6 Standard drinks or equivalent per week     Comment: A little of wine, beer, and liquor    Drug use: No    Sexual activity: Yes     Partners: Female   Other Topics Concern    Not on file   Social History Narrative    Employed as Realtor. Plays [employer] twice per week.      Social Drivers of Health     Financial Resource Strain: Low Risk  (6/3/2023)    Overall Financial Resource Strain (CARDIA)     Difficulty of Paying Living Expenses: Not very hard   Food Insecurity: No Food Insecurity (6/3/2023)    Hunger Vital Sign     Worried About Running Out of Food in the Last Year: Never true     Ran Out of Food in the Last Year: Never true   Transportation Needs: No Transportation Needs (6/3/2023)    PRAPARE - Transportation     Lack of " Transportation (Medical): No     Lack of Transportation (Non-Medical): No   Physical Activity: Sufficiently Active (6/3/2023)    Exercise Vital Sign     Days of Exercise per Week: 3 days     Minutes of Exercise per Session: 100 min   Stress: Stress Concern Present (6/3/2023)    [place] [employer] - Occupational Stress Questionnaire     Feeling of Stress : To some extent   Social Connections: Unknown (6/3/2023)    Social Connection and Isolation Panel [NHANES]     Frequency of Communication with Friends and Family: Twice a week     Frequency of Social Gatherings with Friends and Family: Three times a week     Attends [employer] Services: Patient declined     Active Member of Clubs or Organizations: No     Attends Club or Organization Meetings: Never     Marital Status:    Intimate Partner Violence: Not on file   Housing Stability: Low Risk  (6/3/2023)    Housing Stability Vital Sign     Unable to Pay for Housing in the Last Year: No     Number of Places Lived in the Last Year: 1     Unstable Housing in the Last Year: No     Family History   Problem Relation Age of Onset    Heart Failure Mother     Genetic Disorder Father         alzheimer's    Hypertension Sister     Hypertension Sister     Diabetes Sister     Cancer Maternal Aunt         breast cancer         Studies    Lab Results   Component Value Date/Time    TSHULTRASEN 2.160 01/03/2017 0807        Lab Results   Component Value Date/Time    FREET4 1.37 06/12/2020 0807      Lab Results   Component Value Date/Time    HBA1C 6.1 (A) 05/26/2023 12:00 AM     Lab Results   Component Value Date/Time    CHOLSTRLTOT 168 07/21/2021 09:10 AM     (H) 07/21/2021 09:10 AM    HDL 39 (A) 07/21/2021 09:10 AM    TRIGLYCERIDE 142 07/21/2021 09:10 AM       Lab Results   Component Value Date/Time    SODIUM 141 11/27/2023 06:19 AM    SODIUM 139 11/02/2022 06:40 AM    POTASSIUM 4.4 11/27/2023 06:19 AM    POTASSIUM 4.8 11/02/2022 06:40 AM    CHLORIDE  104 2023 06:19 AM    CHLORIDE 104 2022 06:40 AM    CO2 23 2023 06:19 AM    CO2 22 2022 06:40 AM    GLUCOSE 103 (H) 2023 06:19 AM    GLUCOSE 110 (H) 2022 06:40 AM    BUN 19 2023 06:19 AM    BUN 19 2022 06:40 AM    CREATININE 1.17 2023 06:19 AM    CREATININE 0.93 2022 06:40 AM    BUNCREATRAT 16 2023 06:19 AM     Lab Results   Component Value Date/Time    ALKPHOSPHAT 79 2022 06:40 AM    ASTSGOT 23 2022 06:40 AM    ALTSGPT 29 2022 06:40 AM    TBILIRUBIN 0.6 2022 06:40 AM        Echocardiogram:  No results found for this or any previous visit.    EC/15/25 personally reviewed and interpreted  Sinus bradycardia 55bpm   Incomplete right bundle branch block   Compared to ECG 2022 06:56:04   No significant changes   Electronically Signed On 07- 16:19:30 PDT by Jermaine Kwon MD     Assessment and Recommendations:    Problem List Items Addressed This Visit          Cardiology Medicine Problems    Hyperlipidemia    Relevant Medications    sildenafil citrate (VIAGRA) 100 MG tablet    nitroglycerin (NITROSTAT) 0.4 MG SL Tab    hydrALAZINE (APRESOLINE) 25 MG Tab       Other    HTN (hypertension) - Primary    Relevant Medications    sildenafil citrate (VIAGRA) 100 MG tablet    nitroglycerin (NITROSTAT) 0.4 MG SL Tab    hydrALAZINE (APRESOLINE) 25 MG Tab    Other Relevant Orders    EKG (Completed)    Class 3 severe obesity due to excess calories with body mass index (BMI) of 45.0 to 49.9 in adult    Relevant Medications    Tirzepatide (MOUNJARO) 5 MG/0.5ML Solution Auto-injector    Incomplete RBBB    Relevant Medications    sildenafil citrate (VIAGRA) 100 MG tablet    nitroglycerin (NITROSTAT) 0.4 MG SL Tab    hydrALAZINE (APRESOLINE) 25 MG Tab    Other male erectile dysfunction    Relevant Medications    sildenafil citrate (VIAGRA) 100 MG tablet     Other Visit Diagnoses         Other chest pain        Relevant Medications     nitroglycerin (NITROSTAT) 0.4 MG SL Tab          Regarding his recent polycythemia, I advised him to increase testosterone supplementation in half of his current dose and to follow-up with his PCP in that regards; and to start aspirin 81 mg daily.  I recommend hematology referral if his polycythemia does not improve with decreasing testosterone supplementation.  I also recommend ESTELA screening    Prescribed Mounjaro 5 mg weekly as a starting dose and update me every 4 weeks to facilitate his weight loss approach.  He will keep me updated    Advised him to stop metoprolol to further help with his weight loss approach, and he is only on a very low-dose with likely minimal benefit at this time    Prescribed sildenafil as needed and reminded him not to use any sublingual nitroglycerin within 24 hours of using sildenafil.    Given FUAD on HCTZ, higher dose indapamide and lower extremity edema on amlodipine, I advise starting hydralazine 12.5 mg twice daily and systolic to hopefully get his blood pressure to be persistently < 130/80mmHg.  Almost at goal.  Advised him to send me an updated blood pressure log in 1 week    Thank you for the opportunity to be involved in Car Coyle 's cardiovascular care; and please reach out with any questions or concerns.      Return in about 6 months (around 1/15/2026).    Jermaine Kwon MD, MPH [facility]  Interventional Cardiologist  [facility]   of Clinical Internal Medicine - [facility]    ~ Portions of this note were completed using voice recognition software (Dragon Naturally speaking software) . Occasional transcription errors may have escaped proof reading. I have made every reasonable attempt to correct obvious errors, but I expect that there are errors of grammar and possibly content that I did not discover before finalizing the note. ~         [1]   Past Medical History:  Diagnosis Date    Chronic  atelectasis 02/05/2025    Reduced ERV secondary to obesity       Daytime sleepiness     HDL deficiency 06/13/2012    High cholesterol     HTN (hypertension) 06/13/2012    Hyperlipidemia 06/13/2012    Hypertriglyceridemia 06/13/2012    Hypotestosteronism 06/27/2012    Seasonal allergies     Inhaler us as needed.    Sleep disorder breathing 10/17/2014    Oct 2014. Abnormal overnight oximetry. 136 desats.      Snoring     Vitamin d deficiency 06/27/2012   [2]   Past Surgical History:  Procedure Laterality Date    KNEE ARTHROSCOPY  2005    right knee    EYE SURGERY      cataract surgery     INCISION HERNIA REPAIR  1970s    TONSILLECTOMY     [3]   Allergies  Allergen Reactions    Amlodipine Swelling     Lower extremity edema    Hydrochlorothiazide Unspecified     Acute kidney injury   [4]   Outpatient Encounter Medications as of 7/15/2025   Medication Sig Dispense Refill    Probiotic Product (PROBIOTIC PO) Take  by mouth.      Aug Betamethasone Dipropionate (DIPROLENE-AF) 0.05 % Cream       Testosterone Cypionate 200 MG/ML Solution Inject  as directed.      Tirzepatide (MOUNJARO) 5 MG/0.5ML Solution Auto-injector Inject 5 mg under the skin every 7 days. 2 mL 3    sildenafil citrate (VIAGRA) 100 MG tablet Take 1 Tablet by mouth 1 time a day as needed for Erectile Dysfunction. 10 Tablet 3    nitroglycerin (NITROSTAT) 0.4 MG SL Tab Place 1 Tablet under the tongue as needed for Chest Pain. 25 Tablet 1    hydrALAZINE (APRESOLINE) 25 MG Tab Take 0.5 Tablets by mouth 2 times a day. 45 Tablet 3    azelastine (ASTELIN) 0.1 % nasal spray Administer 1-2 Sprays into affected nostril(S) 2 times a day. 30 mL 11    atorvastatin (LIPITOR) 40 MG Tab TAKE 1 TABLET BY MOUTH EVERY DAY IN THE EVENING 90 Tablet 1    lisinopril (PRINIVIL) 40 MG tablet Take 1 Tablet by mouth every day. 90 Tablet 1    indapamide (LOZOL) 2.5 MG Tab TAKE 1 TABLET EVERY MORNING 90 Tablet 3    albuterol 108 (90 Base) MCG/ACT Aero Soln inhalation aerosol USE 1 TO 2  INHALATIONS ORALLY EVERY 4 HOURS AS NEEDED FOR SHORTNESS OF BREATH (Patient taking differently: as needed.) 18 g 10    Multiple Vitamins-Minerals (MENS MULTI VITAMIN & MINERAL PO) Take  by mouth.      acetaminophen (TYLENOL) 500 MG Tab Take 1,000 mg by mouth every 6 hours as needed.      Lactobacillus Rhamnosus, GG, (CULTURELLE IMMUNITY SUPPORT PO) Take 1 Tablet by mouth every day.      fluticasone (FLONASE) 50 MCG/ACT nasal spray Administer 1 Spray into affected nostril(S) every day.      MULTIPLE VITAMIN PO Take 1 Tab by mouth every day.      [DISCONTINUED] metoprolol SR (TOPROL XL) 25 MG TABLET SR 24 HR Take 0.5 Tablets by mouth every day. 45 Tablet 0    [DISCONTINUED] Semaglutide (WEGOVY) 0.25 MG/0.5ML Solution Auto-injector Pen-injector Inject 0.5 mL under the skin every 7 days. 4 Each 0    [DISCONTINUED] amLODIPine (NORVASC) 10 MG Tab Take 1 Tablet by mouth every day. (Patient not taking: Reported on 7/15/2025) 90 Tablet 3    [DISCONTINUED] nitroglycerin (NITROSTAT) 0.4 MG SL Tab PLACE 1 TABLET UNDER THE TONGUE AS NEEDED FOR CHEST PAIN. 25 Tablet 1     No facility-administered encounter medications on file as of 7/15/2025.

## 2025-07-15 NOTE — PATIENT INSTRUCTIONS
Discuss decreasing testosterone dose in half with Dr. Pathak  Start Aspirin 81mg once a day  Mounjaro 5mg weekly, update me every 4 weeks  Stop metoprolol  Sildenafil 100mg prn  DO NOT TAKE any nitro within 24 hours of using viagra  Hydralazine 12.5mg AM and PM and 1 week worth of blood pressure log, twice a day 2 hours after pills and send me the log

## 2025-07-16 ENCOUNTER — TELEPHONE (OUTPATIENT)
Dept: CARDIOLOGY | Facility: MEDICAL CENTER | Age: 70
End: 2025-07-16
Payer: MEDICARE

## 2025-07-16 NOTE — TELEPHONE ENCOUNTER
Returned call from Charlene at [facility], [place].    Charlene verifying Rx for nitro and viagra cannot be taken within 24hrs of each other. Octavio and Charlene will add to admin instruction.

## 2025-07-16 NOTE — TELEPHONE ENCOUNTER
AL    Caller:  Charlene -     Topic/issue:   Would like a call back, regarding a drug interaction..     Callback Number:   [phone]    Thank you,   Aster TRONCOSO

## 2025-07-18 ENCOUNTER — TELEPHONE (OUTPATIENT)
Dept: CARDIOLOGY | Facility: MEDICAL CENTER | Age: 70
End: 2025-07-18
Payer: MEDICARE

## 2025-07-18 NOTE — TELEPHONE ENCOUNTER
Lizz Ralph,    This patient was prescribed Mounjaro. Unfortunately, as I was gathering the clinical information to submit the PA for this patient, I saw that the patient does not have Type 2 Diabetes or an A1C equal to or above 6.5 at this time.    Per Medicare insurance coverage, they will only cover GLP-1 medications for the FDA approved indications, such as Diabetes, Major Cardiovascular events (IE: MI, Stroke, PAD), or Sleep Apnea (with sleep study results and trial / use or CPAP).    I do not see that this patient meets any of this criteria that would be approved for a GLP-1 medication at this time. Does MD have any clinical information that I can provide to the insurance to prove the patient's eligibility for a GLP-1 based on the required criteria?    Please advise on how to proceed with this patient's GLP-1 therapy or how I can assist from here.    Thank you!    Melly ESPARZA  Rx Coordinator

## 2025-07-21 NOTE — TELEPHONE ENCOUNTER
AL - Please advise. Pt reports that he does not have an established diagnosis of ESTELA but does have a pulm/sleep appt in December. Thank you!    Noted:  Jermiane Kwon M.D. to Me     7/21/25 10:17 AM  Loree, can you please update patient of this and ask if he has an established diagnosis of ESTELA which can help get this medicine prior auth. Thanks    Phone Number Called: [phone]  Call outcome: Spoke to patient regarding message below.  Message: Called to inform patient that his insurance requires specific diagnoses or conditions for Mounjaro to be approved and to ask if he has an established diagnosis of ESTELA that can help get this medication's prior auth approved. Per pt he does not have an established diagnosis of ESTELA yet. Per pt he does have an appointment with Pulmonary and Sleep medicine in December. Advised pt that it may be a good idea to reach out to them to see if there is sooner availability. Advised pt that in the meantime we will reach out to Dr. Kwon for his recommendations on next steps going forward. Advised pt that if/once we receive recs from AL we will reach back out to him. Pt verbalizes understanding of all discussed above.

## 2025-07-25 NOTE — TELEPHONE ENCOUNTER
Noted:  Jermaine Kwon M.D. to Me     7/25/25 12:38 PM  Thank you. You can relay to him what Melly sent us, no other recs at this time.    Phone Number Called: [phone]  Call outcome: Left detailed message for patient. Informed to call back with any additional questions.  Message: Called to inform patient of AL recs above and Todd Pickard recs previously noted. Informed pt that if he wishes he can get Ozempic through our [facility] pharmacy for $356/month and that this is the lowest cost we can do through our discount program. Advised pt that in the meantime there are no other recs from AL at this time. Advised pt that if he is able to obtain an official diagnosis of ESTELA through the Pulmonary and Sleep Medicine in December, or sooner, that AL may be able to try to prescribe GLP-1 medications at that point that will be covered by his insurance. Provided office number to call back if any questions/concerns.

## 2025-07-25 NOTE — TELEPHONE ENCOUNTER
Lizz Ralph,    I did actually put that in my original message regarding alternative options. Unfortunately, I do not see in this patient's chart that he meets the criteria for any GLP-1 medications based on his current labs / diagnoses.     Medicare will only cover GLP-1 medications for Type 2 Diabetes, Sleep Apnea with use / intolerance to CPAP and documented sleep study showing over 15 events occurred during study, and Major Cardiovascular events such as myocardial infarction, stroke, peripheral artery disease, and amputation due to atherosclerotic disease.     I didn't see that the patient has any of these indications / diagnoses. If AL can provide documentation to show otherwise, I would be able to send that to the insurance to see about getting one approved!    If the patient is ok with paying for one though, we do have our discount program at [facility] that provides Ozempic for $356 per month, though that is unfortunately the lowest cost for a GLP-1 medication right now, including at [facility].    Please let me know how AL would like to proceed.     Thank you!    Melly ESPARZA  Rx Coordinator

## 2025-08-02 ENCOUNTER — PATIENT MESSAGE (OUTPATIENT)
Dept: CARDIOLOGY | Facility: MEDICAL CENTER | Age: 70
End: 2025-08-02
Payer: MEDICARE

## 2025-08-04 ENCOUNTER — PATIENT MESSAGE (OUTPATIENT)
Dept: CARDIOLOGY | Facility: MEDICAL CENTER | Age: 70
End: 2025-08-04
Payer: MEDICARE

## 2025-08-05 ENCOUNTER — PATIENT MESSAGE (OUTPATIENT)
Dept: CARDIOLOGY | Facility: MEDICAL CENTER | Age: 70
End: 2025-08-05
Payer: MEDICARE

## 2025-08-17 ENCOUNTER — PATIENT MESSAGE (OUTPATIENT)
Dept: CARDIOLOGY | Facility: MEDICAL CENTER | Age: 70
End: 2025-08-17
Payer: MEDICARE

## 2025-08-26 ENCOUNTER — PATIENT MESSAGE (OUTPATIENT)
Dept: CARDIOLOGY | Facility: MEDICAL CENTER | Age: 70
End: 2025-08-26
Payer: MEDICARE

## 2025-08-27 ENCOUNTER — PATIENT MESSAGE (OUTPATIENT)
Dept: CARDIOLOGY | Facility: MEDICAL CENTER | Age: 70
End: 2025-08-27
Payer: MEDICARE

## 2025-08-28 ENCOUNTER — PATIENT MESSAGE (OUTPATIENT)
Dept: CARDIOLOGY | Facility: MEDICAL CENTER | Age: 70
End: 2025-08-28
Payer: MEDICARE